# Patient Record
Sex: FEMALE | Race: WHITE | NOT HISPANIC OR LATINO
[De-identification: names, ages, dates, MRNs, and addresses within clinical notes are randomized per-mention and may not be internally consistent; named-entity substitution may affect disease eponyms.]

---

## 2017-01-20 ENCOUNTER — APPOINTMENT (OUTPATIENT)
Dept: DERMATOLOGY | Facility: CLINIC | Age: 57
End: 2017-01-20

## 2017-10-04 ENCOUNTER — FORM ENCOUNTER (OUTPATIENT)
Age: 57
End: 2017-10-04

## 2017-10-05 ENCOUNTER — APPOINTMENT (OUTPATIENT)
Dept: ORTHOPEDIC SURGERY | Facility: CLINIC | Age: 57
End: 2017-10-05
Payer: MEDICARE

## 2017-10-05 ENCOUNTER — OUTPATIENT (OUTPATIENT)
Dept: OUTPATIENT SERVICES | Facility: HOSPITAL | Age: 57
LOS: 1 days | End: 2017-10-05
Payer: MEDICARE

## 2017-10-05 ENCOUNTER — APPOINTMENT (OUTPATIENT)
Dept: ORTHOPEDIC SURGERY | Facility: CLINIC | Age: 57
End: 2017-10-05
Payer: OTHER MISCELLANEOUS

## 2017-10-05 VITALS
DIASTOLIC BLOOD PRESSURE: 80 MMHG | WEIGHT: 140 LBS | HEIGHT: 63 IN | SYSTOLIC BLOOD PRESSURE: 120 MMHG | BODY MASS INDEX: 24.8 KG/M2

## 2017-10-05 DIAGNOSIS — M76.01 GLUTEAL TENDINITIS, RIGHT HIP: ICD-10-CM

## 2017-10-05 DIAGNOSIS — M70.62 TROCHANTERIC BURSITIS, RIGHT HIP: ICD-10-CM

## 2017-10-05 DIAGNOSIS — Z98.89 OTHER SPECIFIED POSTPROCEDURAL STATES: Chronic | ICD-10-CM

## 2017-10-05 DIAGNOSIS — M76.02 GLUTEAL TENDINITIS, LEFT HIP: ICD-10-CM

## 2017-10-05 DIAGNOSIS — M70.61 TROCHANTERIC BURSITIS, RIGHT HIP: ICD-10-CM

## 2017-10-05 PROCEDURE — 72020 X-RAY EXAM OF SPINE 1 VIEW: CPT | Mod: 26

## 2017-10-05 PROCEDURE — 72020 X-RAY EXAM OF SPINE 1 VIEW: CPT

## 2017-10-05 PROCEDURE — 99203 OFFICE O/P NEW LOW 30 MIN: CPT

## 2017-10-05 PROCEDURE — 73521 X-RAY EXAM HIPS BI 2 VIEWS: CPT

## 2017-10-05 PROCEDURE — ZZZZZ: CPT

## 2017-10-05 PROCEDURE — 73521 X-RAY EXAM HIPS BI 2 VIEWS: CPT | Mod: 26

## 2017-10-20 PROBLEM — M76.02 TENDINOPATHY OF LEFT GLUTEAL REGION: Status: ACTIVE | Noted: 2017-10-20

## 2017-10-20 PROBLEM — M70.61 TROCHANTERIC BURSITIS OF BOTH HIPS: Status: ACTIVE | Noted: 2017-10-05

## 2017-10-20 PROBLEM — M76.01 TENDINOPATHY OF RIGHT GLUTEAL REGION: Status: ACTIVE | Noted: 2017-10-20

## 2017-10-20 RX ORDER — BUPROPION HCL 200 MG
TABLET,SUSTAINED-RELEASE 12 HR ORAL
Refills: 0 | Status: ACTIVE | COMMUNITY

## 2017-10-20 RX ORDER — CLONAZEPAM 2 MG/1
TABLET ORAL
Refills: 0 | Status: ACTIVE | COMMUNITY

## 2017-10-20 RX ORDER — CETIRIZINE HCL 10 MG
TABLET ORAL
Refills: 0 | Status: ACTIVE | COMMUNITY

## 2017-10-20 RX ORDER — LAMOTRIGINE 25 MG/1
TABLET ORAL
Refills: 0 | Status: ACTIVE | COMMUNITY

## 2018-07-14 ENCOUNTER — EMERGENCY (EMERGENCY)
Facility: HOSPITAL | Age: 58
LOS: 1 days | Discharge: HOME | End: 2018-07-14
Attending: EMERGENCY MEDICINE
Payer: MEDICARE

## 2018-07-14 VITALS
DIASTOLIC BLOOD PRESSURE: 69 MMHG | HEIGHT: 63 IN | TEMPERATURE: 97 F | RESPIRATION RATE: 17 BRPM | WEIGHT: 143.08 LBS | HEART RATE: 65 BPM | OXYGEN SATURATION: 97 % | SYSTOLIC BLOOD PRESSURE: 101 MMHG

## 2018-07-14 DIAGNOSIS — Z98.89 OTHER SPECIFIED POSTPROCEDURAL STATES: Chronic | ICD-10-CM

## 2018-07-14 PROCEDURE — 99283 EMERGENCY DEPT VISIT LOW MDM: CPT | Mod: 25

## 2018-07-14 PROCEDURE — 73630 X-RAY EXAM OF FOOT: CPT

## 2018-07-14 PROCEDURE — 99284 EMERGENCY DEPT VISIT MOD MDM: CPT

## 2018-07-14 PROCEDURE — 73630 X-RAY EXAM OF FOOT: CPT | Mod: 26,RT

## 2018-12-29 ENCOUNTER — EMERGENCY (EMERGENCY)
Facility: HOSPITAL | Age: 58
LOS: 1 days | Discharge: HOME | End: 2018-12-29
Attending: EMERGENCY MEDICINE
Payer: MEDICARE

## 2018-12-29 VITALS
SYSTOLIC BLOOD PRESSURE: 154 MMHG | DIASTOLIC BLOOD PRESSURE: 102 MMHG | WEIGHT: 136.03 LBS | HEART RATE: 96 BPM | TEMPERATURE: 97 F | HEIGHT: 63 IN | RESPIRATION RATE: 16 BRPM | OXYGEN SATURATION: 97 %

## 2018-12-29 DIAGNOSIS — Z98.89 OTHER SPECIFIED POSTPROCEDURAL STATES: Chronic | ICD-10-CM

## 2018-12-29 PROCEDURE — 99283 EMERGENCY DEPT VISIT LOW MDM: CPT

## 2018-12-29 NOTE — ED ADULT NURSE NOTE - OBJECTIVE STATEMENT
pt awake alert oriented x 4 not in dsitress,with compalint of feeling bloated,unable to urinate since 3 pm.

## 2018-12-30 VITALS
SYSTOLIC BLOOD PRESSURE: 143 MMHG | RESPIRATION RATE: 16 BRPM | DIASTOLIC BLOOD PRESSURE: 84 MMHG | HEART RATE: 95 BPM | OXYGEN SATURATION: 100 % | TEMPERATURE: 100 F

## 2018-12-30 LAB
APPEARANCE UR: CLEAR — SIGNIFICANT CHANGE UP
BILIRUB UR-MCNC: NEGATIVE — SIGNIFICANT CHANGE UP
COLOR SPEC: YELLOW — SIGNIFICANT CHANGE UP
DIFF PNL FLD: NEGATIVE — SIGNIFICANT CHANGE UP
GLUCOSE UR QL: NEGATIVE — SIGNIFICANT CHANGE UP
KETONES UR-MCNC: NEGATIVE — SIGNIFICANT CHANGE UP
LEUKOCYTE ESTERASE UR-ACNC: NEGATIVE — SIGNIFICANT CHANGE UP
NITRITE UR-MCNC: NEGATIVE — SIGNIFICANT CHANGE UP
PH UR: 6.5 — SIGNIFICANT CHANGE UP (ref 5–8)
PROT UR-MCNC: NEGATIVE — SIGNIFICANT CHANGE UP
SP GR SPEC: 1.01 — SIGNIFICANT CHANGE UP (ref 1.01–1.02)
UROBILINOGEN FLD QL: NEGATIVE — SIGNIFICANT CHANGE UP

## 2018-12-30 PROCEDURE — 99282 EMERGENCY DEPT VISIT SF MDM: CPT

## 2018-12-30 PROCEDURE — 81003 URINALYSIS AUTO W/O SCOPE: CPT

## 2018-12-30 NOTE — ED ADULT NURSE REASSESSMENT NOTE - NS ED NURSE REASSESS COMMENT FT1
pt awake aler toriented x 3not in dsitress,straight catheter done by nurse emmanuel obtained a yellowish output 450ml amount.

## 2018-12-30 NOTE — ED PROVIDER NOTE - OBJECTIVE STATEMENT
patient with HLD here with concern of being unable to void since this afternoon. She has had a URI a with cough and went to urgent care and received bromophed for cough which she has been taking. She took a laxative because she felt constipated and also unable to urinate. while in waiting room in Emergency Department, had bowel movement and abdominal discomfort resolved, but still unable to urinate.

## 2018-12-30 NOTE — ED PROVIDER NOTE - MEDICAL DECISION MAKING DETAILS
patient in urinary retention. patient denies taking any medication daily at home except the cough syrup (although chart indicates bipolar disorder so likely on mood stabilizers etc) I offeref straight cath vs nava, patient opted for straight cath-500cc urine drained. Patient instructed to return to Emergency Department if still unable to void by tomorrow morning. instructed to discontinue cough medication

## 2020-05-07 ENCOUNTER — EMERGENCY (EMERGENCY)
Facility: HOSPITAL | Age: 60
LOS: 1 days | Discharge: HOME | End: 2020-05-07
Attending: EMERGENCY MEDICINE
Payer: COMMERCIAL

## 2020-05-07 VITALS
OXYGEN SATURATION: 99 % | HEART RATE: 99 BPM | DIASTOLIC BLOOD PRESSURE: 64 MMHG | RESPIRATION RATE: 16 BRPM | WEIGHT: 141.98 LBS | SYSTOLIC BLOOD PRESSURE: 93 MMHG | TEMPERATURE: 98 F | HEIGHT: 63 IN

## 2020-05-07 DIAGNOSIS — Z98.89 OTHER SPECIFIED POSTPROCEDURAL STATES: Chronic | ICD-10-CM

## 2020-05-07 PROCEDURE — 99282 EMERGENCY DEPT VISIT SF MDM: CPT

## 2020-05-07 NOTE — ED ADULT NURSE NOTE - NSIMPLEMENTINTERV_GEN_ALL_ED
Implemented All Universal Safety Interventions:  [place] to call system. Call bell, personal items and telephone within reach. Instruct patient to call for assistance. Room bathroom lighting operational. Non-slip footwear when patient is off stretcher. Physically safe environment: no spills, clutter or unnecessary equipment. Stretcher in lowest position, wheels locked, appropriate side rails in place.

## 2020-05-07 NOTE — ED ADULT NURSE NOTE - PAIN RATING/NUMBER SCALE (0-10): ACTIVITY
Patient called me back and had many questions about the difference between a lumpectomy and mastectomy and what reconstruction would look like.    Patient requested to have a plastic surgery consult within the next week so she can discuss the options.    Patient is now scheduled to see Dr. Rayo at [facility] in [place] on 2/27/2019 @ 0930, with a check in time of 0915.      I will notify Dr. Montaño.    I also called patient back and left her a message to call me so I can inform of plastics consult.  Awaiting a return call.  Tatyana Pimentel, RN, BSN     5

## 2020-05-07 NOTE — ED PROVIDER NOTE - CLINICAL SUMMARY MEDICAL DECISION MAKING FREE TEXT BOX
cervical stenosis and bilateral carpal tunnel syndrome under care of neurology/pain management,need wrist braist

## 2020-05-07 NOTE — ED PROVIDER NOTE - CHPI ED SYMPTOMS NEG
no blurred vision/no confusion/no dizziness/no fever/no change in level of consciousness/no loss of consciousness/no nausea/no weakness

## 2020-05-07 NOTE — ED PROVIDER NOTE - OBJECTIVE STATEMENT
pt under going stubbs with neurology/orthopedic for cervical spine stenosis/bilateral carpal tunnel syndrome,co of pain in both hands.pt given acortisone injection yesterday to right thumb by pain management withouot relief.wants bilateral braces

## 2020-05-07 NOTE — ED PROVIDER NOTE - CARE PLAN
Principal Discharge DX:	Cervical stenosis of spinal canal  Secondary Diagnosis:	Carpal tunnel syndrome on both sides

## 2020-05-07 NOTE — ED PROVIDER NOTE - PATIENT PORTAL LINK FT
You can access the FollowMyHealth Patient Portal offered by  by registering at the following website: [email or web address]. By joining [employer]’s FollowMyHealth portal, you will also be able to view your health information using other applications (apps) compatible with our system.

## 2020-08-11 ENCOUNTER — EMERGENCY (EMERGENCY)
Facility: HOSPITAL | Age: 60
LOS: 1 days | Discharge: HOME | End: 2020-08-11
Attending: EMERGENCY MEDICINE
Payer: COMMERCIAL

## 2020-08-11 VITALS
RESPIRATION RATE: 16 BRPM | OXYGEN SATURATION: 99 % | SYSTOLIC BLOOD PRESSURE: 129 MMHG | DIASTOLIC BLOOD PRESSURE: 87 MMHG | TEMPERATURE: 98 F | WEIGHT: 154.98 LBS | HEIGHT: 63 IN | HEART RATE: 65 BPM

## 2020-08-11 DIAGNOSIS — Z98.89 OTHER SPECIFIED POSTPROCEDURAL STATES: Chronic | ICD-10-CM

## 2020-08-11 PROCEDURE — 99284 EMERGENCY DEPT VISIT MOD MDM: CPT | Mod: 25

## 2020-08-11 NOTE — ED ADULT TRIAGE NOTE - CHIEF COMPLAINT QUOTE
C/o " I fel like my tongue is swollen and I feel like I have sores on it since yesterday, both my jaws hurt and I am unable to open my mouth all the way.". Pt post Anterior cervical spinal fusion on 8/3/2020. Pt speaking in full sentences, no respiratory distress noted. C/o " I fel like my tongue is swollen and I feel like I have sores on it since yesterday, both my jaws hurt and I am unable to open my mouth all the way.i also have pain in my ears". Pt post Anterior cervical spinal fusion on 8/3/2020. Pt speaking in full sentences, no respiratory distress noted. C/o " I fel like my tongue is swollen and I feel like I have sores on it since yesterday, both my jaws hurt and I am unable to open my mouth all the way. I also have pain in my ears". Pt post Anterior cervical spinal fusion on 8/3/2020. Pt speaking in full sentences, no respiratory distress noted.

## 2020-08-12 VITALS
TEMPERATURE: 97 F | OXYGEN SATURATION: 99 % | SYSTOLIC BLOOD PRESSURE: 164 MMHG | RESPIRATION RATE: 17 BRPM | DIASTOLIC BLOOD PRESSURE: 92 MMHG | HEART RATE: 58 BPM

## 2020-08-12 PROCEDURE — 99284 EMERGENCY DEPT VISIT MOD MDM: CPT | Mod: 25

## 2020-08-12 PROCEDURE — 70486 CT MAXILLOFACIAL W/O DYE: CPT

## 2020-08-12 PROCEDURE — 70486 CT MAXILLOFACIAL W/O DYE: CPT | Mod: 26

## 2020-08-12 PROCEDURE — 96372 THER/PROPH/DIAG INJ SC/IM: CPT

## 2020-08-12 RX ORDER — METHOCARBAMOL 500 MG/1
2 TABLET, FILM COATED ORAL
Qty: 40 | Refills: 0
Start: 2020-08-12 | End: 2020-08-16

## 2020-08-12 RX ORDER — METHOCARBAMOL 500 MG/1
1500 TABLET, FILM COATED ORAL ONCE
Refills: 0 | Status: COMPLETED | OUTPATIENT
Start: 2020-08-12 | End: 2020-08-12

## 2020-08-12 RX ORDER — KETOROLAC TROMETHAMINE 30 MG/ML
30 SYRINGE (ML) INJECTION ONCE
Refills: 0 | Status: DISCONTINUED | OUTPATIENT
Start: 2020-08-12 | End: 2020-08-12

## 2020-08-12 RX ADMIN — METHOCARBAMOL 1500 MILLIGRAM(S): 500 TABLET, FILM COATED ORAL at 01:47

## 2020-08-12 RX ADMIN — Medication 30 MILLIGRAM(S): at 01:47

## 2020-08-12 NOTE — ED PROVIDER NOTE - NSFOLLOWUPCLINICS_GEN_ALL_ED_FT
[facility]  Internal Medicine  [address] [place] [address]  Phone: [phone]  Fax: [phone]  Follow Up Time:

## 2020-08-12 NOTE — ED PROVIDER NOTE - CLINICAL SUMMARY MEDICAL DECISION MAKING FREE TEXT BOX
No TMJ dislocation reported on CT. Pt is well appearing,  feels better, has no new complaints and able to walk with normal gait. Pt is stable for discharge and follow up with medical doctor. Pt educated on care and need for follow up. Discussed anticipatory guidance and return precautions. Questions answered. I had a detailed discussion with the patient and/or guardian regarding the historical points, exam findings, and any diagnostic results supporting the discharge diagnosis. Rx Robaxin. pt is on Percocet for recent Cervical fusion sx.

## 2020-08-12 NOTE — ED ADULT NURSE NOTE - CHIEF COMPLAINT QUOTE
C/o " I fel like my tongue is swollen and I feel like I have sores on it since yesterday, both my jaws hurt and I am unable to open my mouth all the way. I also have pain in my ears". Pt post Anterior cervical spinal fusion on 8/3/2020. Pt speaking in full sentences, no respiratory distress noted.

## 2020-08-12 NOTE — ED PROVIDER NOTE - PATIENT PORTAL LINK FT
You can access the FollowMyHealth Patient Portal offered by [facility] by registering at the following website: [email or web address]. By joining [employer]’s FollowMyHealth portal, you will also be able to view your health information using other applications (apps) compatible with our system.

## 2020-08-12 NOTE — ED PROVIDER NOTE - CHILD ABUSE FACILITY
Viridiana Mcmanus Patient Age: 72 year old  MESSAGE:   Pt is returning your call to r/s an appt with Cathy.     Next and Last Visit with Provider and Department  Last visit with this provider: 5/2/2019  Last visit with this department: 5/2/2019    Next visit with this provider: Visit date not found  Next visit with this department: Visit date not found    ALLERGIES:  Keflex  Current Outpatient Medications   Medication   • amLODIPine (NORVASC) 10 MG tablet   • aspirin 81 MG tablet   • CALTRATE 600 1500 (600 Ca) MG Tab   • clonazePAM (KLONOPIN) 0.5 MG tablet   • Omega-3 Fatty Acids (FISH OIL) 1200 MG capsule   • esomeprazole (NEXIUM) 40 MG capsule   • sertraline (ZOLOFT) 100 MG tablet   • simvastatin (ZOCOR) 20 MG tablet   • budesonide-formoterol (SYMBICORT) 160-4.5 MCG/ACT inhaler   • thiothixene (NAVANE) 1 MG capsule   • metoPROLOL succinate (TOPROL XL) 100 MG 24 hr tablet   • Cyanocobalamin 1000 MCG Tab CR   • Cholecalciferol (VITAMIN D3) 2000 units capsule     No current facility-administered medications for this visit.      PHARMACY to use:           Pharmacy preference(s) on file: No Pharmacies Listed    CALL BACK INFO: Ok to leave response (including medical information) with family member or on answering machine    PCP: Nicholas Sherwood DO         INS: Payor: MEDICARE / Plan: PARTA AND B / Product Type: MEDICARE     PATIENT ADDRESS:  [address] [place] [address] [place] [address]   H

## 2020-08-12 NOTE — ED PROVIDER NOTE - OBJECTIVE STATEMENT
Chief complaint of left TMJ area tenderness x 2 days. pt is s/p cervical fusion surgery at [facility] on 8/3/20 for cervical stenosis.

## 2020-08-12 NOTE — ED PROVIDER NOTE - NSFOLLOWUPINSTRUCTIONS_ED_ALL_ED_FT
Temporomandibular Joint Syndrome     Temporomandibular joint syndrome (TMJ syndrome) is a condition that causes pain in the temporomandibular joints. These joints are located near your ears and allow your jaw to open and close. For people with TMJ syndrome, chewing, biting, or other movements of the jaw can be difficult or painful.  TMJ syndrome is often mild and goes away within a few weeks. However, sometimes the condition becomes a long-term (chronic) problem.  What are the causes?  This condition may be caused by:  Grinding your teeth or clenching your jaw. Some people do this when they are under stress.Arthritis.Injury to the jaw.Head or neck injury.Teeth or dentures that are not aligned well.In some cases, the cause of TMJ syndrome may not be known.  What are the signs or symptoms?  The most common symptom of this condition is an aching pain on the side of the head in the area of the TMJ. Other symptoms may include:  Pain when moving your jaw, such as when chewing or biting.Being unable to open your jaw all the way.Making a clicking sound when you open your mouth.Headache.Earache.Neck or shoulder pain.How is this diagnosed?  This condition may be diagnosed based on:  Your symptoms and medical history.A physical exam. Your health care provider may check the range of motion of your jaw.Imaging tests, such as X-rays or an MRI.You may also need to see your dentist, who will determine if your teeth and jaw are lined up correctly.  How is this treated?  TMJ syndrome often goes away on its own. If treatment is needed, the options may include:  Eating soft foods and applying ice or heat.Medicines to relieve pain or inflammation.Medicines or massage to relax the muscles.A splint, bite plate, or mouthpiece to prevent teeth grinding or jaw clenching.Relaxation techniques or counseling to help reduce stress.A therapy for pain in which an electrical current is applied to the nerves through the skin (transcutaneous electrical nerve stimulation).Acupuncture. This is sometimes helpful to relieve pain.Jaw surgery. This is rarely needed.Follow these instructions at home:     Eating and drinking     Eat a soft diet if you are having trouble chewing.Avoid foods that require a lot of chewing. Do not chew gum.General instructions     Take over-the-counter and prescription medicines only as told by your health care provider.If directed, put ice on the painful area.  Put ice in a plastic bag.Place a towel between your skin and the bag.Leave the ice on for 20 minutes, 2–3 times a day.Apply a warm, wet cloth (warm compress) to the painful area as directed.Massage your jaw area and do any jaw stretching exercises as told by your health care provider.If you were given a splint, bite plate, or mouthpiece, wear it as told by your health care provider.Keep all follow-up visits as told by your health care provider. This is important.Contact a health care provider if:  You are having trouble eating.You have new or worsening symptoms.Get help right away if:  Your jaw locks open or closed.Summary  Temporomandibular joint syndrome (TMJ syndrome) is a condition that causes pain in the temporomandibular joints. These joints are located near your ears and allow your jaw to open and close.TMJ syndrome is often mild and goes away within a few weeks. However, sometimes the condition becomes a long-term (chronic) problem.Symptoms include an aching pain on the side of the head in the area of the TMJ, pain when chewing or biting, and being unable to open your jaw all the way. You may also make a clicking sound when you open your mouth.TMJ syndrome often goes away on its own. If treatment is needed, it may include medicines to relieve pain, reduce inflammation, or relax the muscles. A splint, bite plate, or mouthpiece may also be used to prevent teeth grinding or jaw clenching.This information is not intended to replace advice given to you by your health care provider. Make sure you discuss any questions you have with your health care provider.

## 2020-08-12 NOTE — ED ADULT NURSE NOTE - OBJECTIVE STATEMENT
came to Ed states with pain to left jaw goes to ear today with history Sx from cervical stenosis 5/3/2020. Pt denies numbness to extremities, or face, able to talk but states with pain when opening her mouth.

## 2020-11-11 ENCOUNTER — APPOINTMENT (OUTPATIENT)
Dept: UROGYNECOLOGY | Facility: CLINIC | Age: 60
End: 2020-11-11
Payer: MEDICAID

## 2020-11-11 VITALS
DIASTOLIC BLOOD PRESSURE: 80 MMHG | TEMPERATURE: 97.3 F | HEIGHT: 63 IN | WEIGHT: 135 LBS | BODY MASS INDEX: 23.92 KG/M2 | SYSTOLIC BLOOD PRESSURE: 130 MMHG

## 2020-11-11 DIAGNOSIS — A49.9 URINARY TRACT INFECTION, SITE NOT SPECIFIED: ICD-10-CM

## 2020-11-11 DIAGNOSIS — N39.0 URINARY TRACT INFECTION, SITE NOT SPECIFIED: ICD-10-CM

## 2020-11-11 PROCEDURE — 51798 US URINE CAPACITY MEASURE: CPT

## 2020-11-11 PROCEDURE — 99204 OFFICE O/P NEW MOD 45 MIN: CPT

## 2020-11-11 PROCEDURE — 99072 ADDL SUPL MATRL&STAF TM PHE: CPT

## 2020-11-11 NOTE — HISTORY OF PRESENT ILLNESS
[FreeTextEntry1] : The pt is a 59 y/o P0 with voiding dysfunction for 6 months.\par She valsalvas to void\par She denies UI.\par She feels complete emptying of her bladder.\par She voids hrly with a varied  volume and has nocturia 1-2 with a large volume\par Her liquid intake consists of 2 L water, 1 latte/day.\par She has a BM q day to Q 2-3 days .  She take Linzess. \par She denies gross hematuria, recurrent UTIs, hx of nephrolithiaisis or vaginal bulge.\par Her last PAP was 2019 , and she has a hx of HPV.  She had colposcopy, which was neg\par The last time she had intercourse was months ago, reports occasional dyspareunia . \par She denies having abdominal surgery.  She did have orthopedic surgery including hip/knee, cervical fusion surgery.\par Her medical prob consists of depression. bipolar disease, migrains...\par She smokes 1/2 PPD since she qas 13.\par She is on numerous meds for bipolar disease.\par

## 2020-11-11 NOTE — ASSESSMENT
[FreeTextEntry1] : It is likely that her voiding dysfunction is related to her psychiatric disorder and side effects of the  medications.\par Today's findings were discussed with the pt    She will be scheduled for urodynamics and office cystoscopy as she is a smoker.  Also, her urine was sent for culture today to rule out an infectious cause of her symptoms.\par \par \par \par

## 2020-11-11 NOTE — LETTER BODY
[Dear  ___] : Dear  [unfilled], [I had the pleasure of evaluating your patient, [unfilled]. Thank you for referring Ms. [unfilled] for consultation for ___] : I had the pleasure of evaluating your patient, [unfilled]. Thank you for referring Ms. [unfilled] for consultation for [unfilled]. [Attached please find my note.] : Attached please find my note. [Thank you very much for allowing me to participate in the care of this patient. If you have any questions, please do not hesitate to contact me] : Thank you very much for allowing me to participate in the care of this patient. If you have any questions, please do not hesitate to contact me. [DrKrista  ___] : Dr. ALMARAZ

## 2020-11-11 NOTE — PHYSICAL EXAM
[No Acute Distress] : in no acute distress [Well developed] : well developed [Well Nourished] : ~L well nourished [Good Hygeine] : demonstrates good hygeine [Oriented x3] : oriented to person, place, and time [Normal Memory] : ~T memory was ~L unimpaired [Normal Mood/Affect] : mood and affect are normal [Anxiety] : patient is anxious [Normal Lung Sounds] : the lungs were clear to auscultation [Respirations regular] : ~T respiratory rate was regular [Rate & Rhythm Regular] : ~T heart rate and rhythm were normal [No Edema] : ~T edema was not present [Supple] : ~T the neck demonstrated no ~M decrease in suppleness [Thyroid Normal] : the thyroid ~T showed no abnormalities [Symmetrical] : the neck was ~L symmetrical [Normal Gait] : gait was normal [Labia Majora] : were normal [Labia Minora] : were normal [Bartholin's Gland] : both Bartholin's glands were normal  [Normal Appearance] : general appearance was normal [No Bleeding] : there was no active vaginal bleeding [2] : 2 [Normal] : no abnormalities [Post Void Residual ____ml] : post void residual was [unfilled] ml [Exam Deferred] : was deferred [Cough] : no cough [Dyspnea] : no dyspnea [Murmurs] : no murmurs were heard [Varicose Veins] : no varicose veins observed [Tracheal Deviation] : no tracheal deviation observed [Mass] : no ~M [unfilled] neck mass was observed [Mass (___ Cm)] : no ~M [unfilled] abdominal mass was palpated [Tenderness] : ~T no ~M abdominal tenderness observed [Distended] : not distended [H/Smegaly] : no hepatosplenomegaly [Hernia] : no hernia observed [Scar] : no scars [de-identified] : good support

## 2020-11-14 LAB — BACTERIA UR CULT: NORMAL

## 2020-12-02 ENCOUNTER — APPOINTMENT (OUTPATIENT)
Dept: UROGYNECOLOGY | Facility: CLINIC | Age: 60
End: 2020-12-02
Payer: MEDICARE

## 2020-12-02 PROCEDURE — 99072 ADDL SUPL MATRL&STAF TM PHE: CPT

## 2020-12-02 PROCEDURE — 52000 CYSTOURETHROSCOPY: CPT

## 2020-12-05 LAB — URINE CYTOLOGY: NORMAL

## 2020-12-14 ENCOUNTER — APPOINTMENT (OUTPATIENT)
Dept: UROGYNECOLOGY | Facility: CLINIC | Age: 60
End: 2020-12-14
Payer: MEDICAID

## 2020-12-14 PROCEDURE — 99213 OFFICE O/P EST LOW 20 MIN: CPT

## 2020-12-14 PROCEDURE — 99072 ADDL SUPL MATRL&STAF TM PHE: CPT

## 2020-12-14 NOTE — ASSESSMENT
[FreeTextEntry1] : The pt is being evaluated by GI for constipation as well.\par She will complete UDS in the near future\par

## 2020-12-23 PROBLEM — N39.0 UTI (URINARY TRACT INFECTION), BACTERIAL: Status: RESOLVED | Noted: 2020-11-07 | Resolved: 2020-12-23

## 2021-01-12 PROCEDURE — 99285 EMERGENCY DEPT VISIT HI MDM: CPT

## 2021-01-13 ENCOUNTER — EMERGENCY (EMERGENCY)
Facility: HOSPITAL | Age: 61
LOS: 1 days | Discharge: HOME | End: 2021-01-13
Attending: EMERGENCY MEDICINE
Payer: COMMERCIAL

## 2021-01-13 VITALS
HEART RATE: 80 BPM | OXYGEN SATURATION: 96 % | TEMPERATURE: 98 F | DIASTOLIC BLOOD PRESSURE: 61 MMHG | SYSTOLIC BLOOD PRESSURE: 114 MMHG | RESPIRATION RATE: 20 BRPM

## 2021-01-13 VITALS
OXYGEN SATURATION: 98 % | HEIGHT: 63 IN | RESPIRATION RATE: 24 BRPM | HEART RATE: 94 BPM | WEIGHT: 134.48 LBS | TEMPERATURE: 98 F | SYSTOLIC BLOOD PRESSURE: 146 MMHG | DIASTOLIC BLOOD PRESSURE: 89 MMHG

## 2021-01-13 DIAGNOSIS — Z98.89 OTHER SPECIFIED POSTPROCEDURAL STATES: Chronic | ICD-10-CM

## 2021-01-13 LAB
ALBUMIN SERPL ELPH-MCNC: 3.6 G/DL — SIGNIFICANT CHANGE UP (ref 3.5–5)
ALP SERPL-CCNC: 96 U/L — SIGNIFICANT CHANGE UP (ref 40–120)
ALT FLD-CCNC: 23 U/L DA — SIGNIFICANT CHANGE UP (ref 10–60)
ANION GAP SERPL CALC-SCNC: 5 MMOL/L — SIGNIFICANT CHANGE UP (ref 5–17)
APPEARANCE UR: CLEAR — SIGNIFICANT CHANGE UP
AST SERPL-CCNC: 9 U/L — LOW (ref 10–40)
BASOPHILS # BLD AUTO: 0.01 K/UL — SIGNIFICANT CHANGE UP (ref 0–0.2)
BASOPHILS NFR BLD AUTO: 0.1 % — SIGNIFICANT CHANGE UP (ref 0–2)
BILIRUB SERPL-MCNC: 0.3 MG/DL — SIGNIFICANT CHANGE UP (ref 0.2–1.2)
BILIRUB UR-MCNC: NEGATIVE — SIGNIFICANT CHANGE UP
BUN SERPL-MCNC: 8 MG/DL — SIGNIFICANT CHANGE UP (ref 7–18)
CALCIUM SERPL-MCNC: 9.4 MG/DL — SIGNIFICANT CHANGE UP (ref 8.4–10.5)
CHLORIDE SERPL-SCNC: 108 MMOL/L — SIGNIFICANT CHANGE UP (ref 96–108)
CO2 SERPL-SCNC: 27 MMOL/L — SIGNIFICANT CHANGE UP (ref 22–31)
COLOR SPEC: YELLOW — SIGNIFICANT CHANGE UP
CREAT SERPL-MCNC: 0.79 MG/DL — SIGNIFICANT CHANGE UP (ref 0.5–1.3)
DIFF PNL FLD: NEGATIVE — SIGNIFICANT CHANGE UP
EOSINOPHIL # BLD AUTO: 0.01 K/UL — SIGNIFICANT CHANGE UP (ref 0–0.5)
EOSINOPHIL NFR BLD AUTO: 0.1 % — SIGNIFICANT CHANGE UP (ref 0–6)
GLUCOSE SERPL-MCNC: 127 MG/DL — HIGH (ref 70–99)
GLUCOSE UR QL: NEGATIVE — SIGNIFICANT CHANGE UP
HCG SERPL-ACNC: 1 MIU/ML — SIGNIFICANT CHANGE UP
HCT VFR BLD CALC: 36.5 % — SIGNIFICANT CHANGE UP (ref 34.5–45)
HGB BLD-MCNC: 11.8 G/DL — SIGNIFICANT CHANGE UP (ref 11.5–15.5)
IMM GRANULOCYTES NFR BLD AUTO: 0.5 % — SIGNIFICANT CHANGE UP (ref 0–1.5)
KETONES UR-MCNC: NEGATIVE — SIGNIFICANT CHANGE UP
LEUKOCYTE ESTERASE UR-ACNC: ABNORMAL
LIDOCAIN IGE QN: 86 U/L — SIGNIFICANT CHANGE UP (ref 73–393)
LYMPHOCYTES # BLD AUTO: 1.66 K/UL — SIGNIFICANT CHANGE UP (ref 1–3.3)
LYMPHOCYTES # BLD AUTO: 16.8 % — SIGNIFICANT CHANGE UP (ref 13–44)
MCHC RBC-ENTMCNC: 28.9 PG — SIGNIFICANT CHANGE UP (ref 27–34)
MCHC RBC-ENTMCNC: 32.3 GM/DL — SIGNIFICANT CHANGE UP (ref 32–36)
MCV RBC AUTO: 89.2 FL — SIGNIFICANT CHANGE UP (ref 80–100)
MONOCYTES # BLD AUTO: 0.65 K/UL — SIGNIFICANT CHANGE UP (ref 0–0.9)
MONOCYTES NFR BLD AUTO: 6.6 % — SIGNIFICANT CHANGE UP (ref 2–14)
NEUTROPHILS # BLD AUTO: 7.49 K/UL — HIGH (ref 1.8–7.4)
NEUTROPHILS NFR BLD AUTO: 75.9 % — SIGNIFICANT CHANGE UP (ref 43–77)
NITRITE UR-MCNC: NEGATIVE — SIGNIFICANT CHANGE UP
NRBC # BLD: 0 /100 WBCS — SIGNIFICANT CHANGE UP (ref 0–0)
PH UR: 7 — SIGNIFICANT CHANGE UP (ref 5–8)
PLATELET # BLD AUTO: 247 K/UL — SIGNIFICANT CHANGE UP (ref 150–400)
POTASSIUM SERPL-MCNC: 4 MMOL/L — SIGNIFICANT CHANGE UP (ref 3.5–5.3)
POTASSIUM SERPL-SCNC: 4 MMOL/L — SIGNIFICANT CHANGE UP (ref 3.5–5.3)
PROT SERPL-MCNC: 7.3 G/DL — SIGNIFICANT CHANGE UP (ref 6–8.3)
PROT UR-MCNC: NEGATIVE — SIGNIFICANT CHANGE UP
RBC # BLD: 4.09 M/UL — SIGNIFICANT CHANGE UP (ref 3.8–5.2)
RBC # FLD: 13.3 % — SIGNIFICANT CHANGE UP (ref 10.3–14.5)
SODIUM SERPL-SCNC: 140 MMOL/L — SIGNIFICANT CHANGE UP (ref 135–145)
SP GR SPEC: 1 — LOW (ref 1.01–1.02)
TROPONIN I SERPL-MCNC: <0.015 NG/ML — SIGNIFICANT CHANGE UP (ref 0–0.04)
UROBILINOGEN FLD QL: NEGATIVE — SIGNIFICANT CHANGE UP
WBC # BLD: 9.87 K/UL — SIGNIFICANT CHANGE UP (ref 3.8–10.5)
WBC # FLD AUTO: 9.87 K/UL — SIGNIFICANT CHANGE UP (ref 3.8–10.5)

## 2021-01-13 PROCEDURE — 74177 CT ABD & PELVIS W/CONTRAST: CPT

## 2021-01-13 PROCEDURE — 96374 THER/PROPH/DIAG INJ IV PUSH: CPT | Mod: XU

## 2021-01-13 PROCEDURE — 84484 ASSAY OF TROPONIN QUANT: CPT

## 2021-01-13 PROCEDURE — 81001 URINALYSIS AUTO W/SCOPE: CPT

## 2021-01-13 PROCEDURE — 74177 CT ABD & PELVIS W/CONTRAST: CPT | Mod: 26

## 2021-01-13 PROCEDURE — 87086 URINE CULTURE/COLONY COUNT: CPT

## 2021-01-13 PROCEDURE — 80053 COMPREHEN METABOLIC PANEL: CPT

## 2021-01-13 PROCEDURE — 96375 TX/PRO/DX INJ NEW DRUG ADDON: CPT

## 2021-01-13 PROCEDURE — 84702 CHORIONIC GONADOTROPIN TEST: CPT

## 2021-01-13 PROCEDURE — 85025 COMPLETE CBC W/AUTO DIFF WBC: CPT

## 2021-01-13 PROCEDURE — 83690 ASSAY OF LIPASE: CPT

## 2021-01-13 PROCEDURE — 99284 EMERGENCY DEPT VISIT MOD MDM: CPT | Mod: 25

## 2021-01-13 PROCEDURE — 36415 COLL VENOUS BLD VENIPUNCTURE: CPT

## 2021-01-13 RX ORDER — POLYETHYLENE GLYCOL 3350 17 G/17G
17 POWDER, FOR SOLUTION ORAL
Qty: 100 | Refills: 0
Start: 2021-01-13 | End: 2021-01-17

## 2021-01-13 RX ORDER — MORPHINE SULFATE 50 MG/1
4 CAPSULE, EXTENDED RELEASE ORAL ONCE
Refills: 0 | Status: DISCONTINUED | OUTPATIENT
Start: 2021-01-13 | End: 2021-01-13

## 2021-01-13 RX ORDER — HYDROMORPHONE HYDROCHLORIDE 2 MG/ML
0.5 INJECTION INTRAMUSCULAR; INTRAVENOUS; SUBCUTANEOUS ONCE
Refills: 0 | Status: DISCONTINUED | OUTPATIENT
Start: 2021-01-13 | End: 2021-01-13

## 2021-01-13 RX ORDER — KETOROLAC TROMETHAMINE 30 MG/ML
30 SYRINGE (ML) INJECTION ONCE
Refills: 0 | Status: DISCONTINUED | OUTPATIENT
Start: 2021-01-13 | End: 2021-01-13

## 2021-01-13 RX ORDER — SODIUM CHLORIDE 9 MG/ML
1000 INJECTION INTRAMUSCULAR; INTRAVENOUS; SUBCUTANEOUS ONCE
Refills: 0 | Status: COMPLETED | OUTPATIENT
Start: 2021-01-13 | End: 2021-01-13

## 2021-01-13 RX ADMIN — MORPHINE SULFATE 4 MILLIGRAM(S): 50 CAPSULE, EXTENDED RELEASE ORAL at 02:24

## 2021-01-13 RX ADMIN — HYDROMORPHONE HYDROCHLORIDE 0.5 MILLIGRAM(S): 2 INJECTION INTRAMUSCULAR; INTRAVENOUS; SUBCUTANEOUS at 04:30

## 2021-01-13 RX ADMIN — SODIUM CHLORIDE 1000 MILLILITER(S): 9 INJECTION INTRAMUSCULAR; INTRAVENOUS; SUBCUTANEOUS at 02:24

## 2021-01-13 RX ADMIN — Medication 30 MILLIGRAM(S): at 02:24

## 2021-01-13 NOTE — ED PROVIDER NOTE - NSFOLLOWUPINSTRUCTIONS_ED_ALL_ED_FT
Take miralax as prescribed for constipation.  Followup with GI specialist for reevaluation of chronic constipation.  Return to ED if you develop fever>100.8F, vomiting or worsening abdominal pain.      Constipation    WHAT YOU NEED TO KNOW:    Constipation is when you have hard, dry bowel movements, or you go longer than usual between bowel movements.     DISCHARGE INSTRUCTIONS:    Call your doctor if:   •You have blood in your bowel movements.      •You have a fever and abdominal pain with the constipation.      •Your constipation gets worse.       •You start to vomit.      •You have questions or concerns about your condition or care.      Medicines:   •Medicine such as a laxative may help relax and loosen your intestines to help you have a bowel movement. Your provider may recommend you only use laxatives for a short time. Long-term use may make your bowels dependent on the medicine.      •Take your medicine as directed. Contact your healthcare provider if you think your medicine is not helping or if you have side effects. Tell him of her if you are allergic to any medicine. Keep a list of the medicines, vitamins, and herbs you take. Include the amounts, and when and why you take them. Bring the list or the pill bottles to follow-up visits. Carry your medicine list with you in case of an emergency.      Relieve constipation:   •A suppository may be used to help soften your bowel movements. This may make them easier to pass. A suppository is guided into your rectum through your anus.  Suppository for Constipation           •An enema is liquid medicine used to clear bowel movement from your rectum. The medicine is put into your rectum through your anus.  Enemas           Prevent constipation:   •Drink liquids as directed. You may need to drink extra liquids to help soften and move your bowels. Ask how much liquid to drink each day and which liquids are best for you.       •Eat high-fiber foods. This may help decrease constipation by adding bulk to your bowel movements. High-fiber foods include fruit, vegetables, whole-grain breads and cereals, and beans. Your healthcare provider or dietitian can help you create a high-fiber meal plan. Your provider may also recommend a fiber supplement if you cannot get enough fiber from food.              •Exercise regularly. Regular physical activity can help stimulate your intestines. Walking is a good exercise to prevent or relieve constipation. Ask which exercises are best for you.  Walking for Exercise           •Schedule a time each day to have a bowel movement. This may help train your body to have regular bowel movements. Bend forward while you are on the toilet to help move the bowel movement out. Sit on the toilet for at least 10 minutes, even if you do not have a bowel movement.       •Talk to your healthcare provider about your medicines. Certain medicines, such as opioids, can cause constipation. Your provider may be able to make medicine changes. For example, he or she may change the kind of medicine, or change when you take it.      Follow up with your healthcare provider as directed: Write down your questions so you remember to ask them during your visits.

## 2021-01-13 NOTE — ED PROVIDER NOTE - CLINICAL SUMMARY MEDICAL DECISION MAKING FREE TEXT BOX
60 y.o female with RUQ pain after eating , will get labs , UA , CT to r/o cholecystitis . Will give IV fluids, Toradol, Morphine and reassess 60 y.o female with RUQ pain after eating , will get labs , UA , CT to r/o cholecystitis . Will give IV fluids, Toradol, Morphine and reassess    labs show wbc wnl, cmp/lipase/trop wnl, UA shows bacteria-pt denies dysuria-will defer abx  CT A/P shows No evidence of acute intra-abdominal pathology. Moderate stool burden. There is fecalization of the small bowel which can be seen with slow motility. Note that CT cannot rule out acute cholecystitis.  Discussed above with patient. Advised patient to make sure she continues stool softeners while taking oxycodone and followup with GI. Patient reports she has daily BMs but she has had past issues with IBS/constipation of pelvic floor weakness, she agrees to followup with GI. patient stable for discharge with careful return to ED precautions.

## 2021-01-13 NOTE — ED PROVIDER NOTE - PATIENT PORTAL LINK FT
You can access the FollowMyHealth Patient Portal offered by [facility] by registering at the following website: [email or web address]. By joining [employer]’s FollowMyHealth portal, you will also be able to view your health information using other applications (apps) compatible with our system.

## 2021-01-13 NOTE — ED ADULT NURSE NOTE - OBJECTIVE STATEMENT
Pt presents to ED with c/o right flank pain started 20 mins prior to ED arrival. Pt denies hx of kidney stone.

## 2021-01-13 NOTE — ED PROVIDER NOTE - NSFOLLOWUPCLINICS_GEN_ALL_ED_FT
[facility]  Gastroenterology  [address] [place] [address]  Phone: [phone]  Fax: [phone]  Follow Up Time:

## 2021-01-13 NOTE — ED PROVIDER NOTE - OBJECTIVE STATEMENT
60 y.o female with a PMHx of anxiety, depression, and bipolar disorder , cervical fusion and a PSHx of orthopedic surgeries reports to the ED c/o sudden onset RUQ pain x30 prior to arrival after  eating asparagus and potatoes for dinner. Patient endorses the pain radiates to her back and R shoulder, but states she has chronic shoulder pain related to the cervical fusion. Patient states she took a dose of oxycodone prior to arrival but states they did not help with the pain. Patient denies any fever, vomiting, diarrhea, urinary symptoms, cough or any other acute complaints. NKDA

## 2021-01-14 LAB
CULTURE RESULTS: SIGNIFICANT CHANGE UP
SPECIMEN SOURCE: SIGNIFICANT CHANGE UP

## 2021-02-08 ENCOUNTER — APPOINTMENT (OUTPATIENT)
Dept: UROGYNECOLOGY | Facility: CLINIC | Age: 61
End: 2021-02-08
Payer: MEDICARE

## 2021-02-22 ENCOUNTER — APPOINTMENT (OUTPATIENT)
Dept: UROGYNECOLOGY | Facility: CLINIC | Age: 61
End: 2021-02-22
Payer: MEDICARE

## 2021-02-22 PROCEDURE — 51797 INTRAABDOMINAL PRESSURE TEST: CPT

## 2021-02-22 PROCEDURE — 51729 CYSTOMETROGRAM W/VP&UP: CPT

## 2021-02-22 PROCEDURE — 51798 US URINE CAPACITY MEASURE: CPT

## 2021-02-22 PROCEDURE — 51784 ANAL/URINARY MUSCLE STUDY: CPT

## 2021-02-22 PROCEDURE — 99072 ADDL SUPL MATRL&STAF TM PHE: CPT

## 2021-02-22 PROCEDURE — 51741 ELECTRO-UROFLOWMETRY FIRST: CPT

## 2021-03-01 ENCOUNTER — APPOINTMENT (OUTPATIENT)
Dept: UROGYNECOLOGY | Facility: CLINIC | Age: 61
End: 2021-03-01
Payer: MEDICAID

## 2021-03-01 PROCEDURE — 99072 ADDL SUPL MATRL&STAF TM PHE: CPT

## 2021-03-01 PROCEDURE — 99213 OFFICE O/P EST LOW 20 MIN: CPT

## 2021-03-01 NOTE — HISTORY OF PRESENT ILLNESS
[FreeTextEntry1] : The pt is here for a f/u visit for feeling of voiding dysfunction\par \par UDS:\par -LILIYA, -DO, +complete emptying with detrussor ctx\par Nl pelvic floor muscles.\par \par She reports that her voiding has improved since

## 2021-03-01 NOTE — ASSESSMENT
[FreeTextEntry1] : She has nl UDS and cystoscopy.\par It likely that she has voiding difficulty from constipation and bloating.\par She was advised to f/u with her GI.

## 2021-09-30 ENCOUNTER — TRANSCRIPTION ENCOUNTER (OUTPATIENT)
Age: 61
End: 2021-09-30

## 2021-10-04 ENCOUNTER — TRANSCRIPTION ENCOUNTER (OUTPATIENT)
Age: 61
End: 2021-10-04

## 2021-10-05 ENCOUNTER — APPOINTMENT (OUTPATIENT)
Dept: ORTHOPEDIC SURGERY | Facility: CLINIC | Age: 61
End: 2021-10-05

## 2021-10-07 ENCOUNTER — APPOINTMENT (OUTPATIENT)
Dept: ORTHOPEDIC SURGERY | Facility: CLINIC | Age: 61
End: 2021-10-07
Payer: MEDICARE

## 2021-10-07 VITALS — BODY MASS INDEX: 23.92 KG/M2 | WEIGHT: 135 LBS | HEIGHT: 63 IN

## 2021-10-07 PROCEDURE — 99202 OFFICE O/P NEW SF 15 MIN: CPT

## 2021-10-07 NOTE — HISTORY OF PRESENT ILLNESS
[de-identified] : 61F who presents for work clearance note after suffering a minor R knee injury several days ago. Patient states she was at a friend's house and tripped, falling onto her knees. She had mild-moderate pain following the fall and went to an urgent care clinic where R knee XRs were negative for acute fracture. She states that her boss then requested that she receive an evaluation by a doctor and a formal clearance note in order to be able to return to work. She states that her symptoms have completely resolved at this point and she has no pain.

## 2021-10-07 NOTE — ASSESSMENT
[FreeTextEntry1] : 61F who presents several days after minor trauma to R knee requesting a return to work note\par - Patient without evidence of fracture or significant knee injury on imaging or exam\par - Patient may return to work without limitations\par - Note provided to patient\par - No need to return to clinic unless new orthopedic issue arises

## 2021-10-07 NOTE — PHYSICAL EXAM
[de-identified] : R knee: Grossly normal in appearance, small contusion over the medial aspect of the knee with minimal associated TTP\par Knee ROM is full and painless 0-125\par No instability to varus/valgus stress\par NVID [de-identified] : R knee XR shows mild medial compartment knee osteoarthritis and small suprapatellar effusion\par No evidence of acute fracture

## 2021-10-28 ENCOUNTER — TRANSCRIPTION ENCOUNTER (OUTPATIENT)
Age: 61
End: 2021-10-28

## 2021-11-23 ENCOUNTER — RESULT REVIEW (OUTPATIENT)
Age: 61
End: 2021-11-23

## 2021-11-23 ENCOUNTER — OUTPATIENT (OUTPATIENT)
Dept: OUTPATIENT SERVICES | Facility: HOSPITAL | Age: 61
LOS: 1 days | End: 2021-11-23
Payer: MEDICARE

## 2021-11-23 ENCOUNTER — APPOINTMENT (OUTPATIENT)
Dept: ORTHOPEDIC SURGERY | Facility: CLINIC | Age: 61
End: 2021-11-23
Payer: MEDICARE

## 2021-11-23 VITALS
HEIGHT: 63 IN | HEART RATE: 88 BPM | WEIGHT: 140 LBS | BODY MASS INDEX: 24.8 KG/M2 | DIASTOLIC BLOOD PRESSURE: 54 MMHG | OXYGEN SATURATION: 97 % | SYSTOLIC BLOOD PRESSURE: 112 MMHG

## 2021-11-23 DIAGNOSIS — M25.872 OTHER SPECIFIED JOINT DISORDERS, LEFT ANKLE AND FOOT: ICD-10-CM

## 2021-11-23 DIAGNOSIS — Z98.89 OTHER SPECIFIED POSTPROCEDURAL STATES: Chronic | ICD-10-CM

## 2021-11-23 DIAGNOSIS — M25.871 OTHER SPECIFIED JOINT DISORDERS, RIGHT ANKLE AND FOOT: ICD-10-CM

## 2021-11-23 PROCEDURE — 73564 X-RAY EXAM KNEE 4 OR MORE: CPT

## 2021-11-23 PROCEDURE — 73610 X-RAY EXAM OF ANKLE: CPT | Mod: 26,LT,RT

## 2021-11-23 PROCEDURE — 73564 X-RAY EXAM KNEE 4 OR MORE: CPT | Mod: 26,LT,RT

## 2021-11-23 PROCEDURE — 99214 OFFICE O/P EST MOD 30 MIN: CPT

## 2021-11-23 PROCEDURE — 73610 X-RAY EXAM OF ANKLE: CPT

## 2021-12-01 ENCOUNTER — TRANSCRIPTION ENCOUNTER (OUTPATIENT)
Age: 61
End: 2021-12-01

## 2021-12-14 ENCOUNTER — NON-APPOINTMENT (OUTPATIENT)
Age: 61
End: 2021-12-14

## 2021-12-15 ENCOUNTER — NON-APPOINTMENT (OUTPATIENT)
Age: 61
End: 2021-12-15

## 2021-12-20 ENCOUNTER — TRANSCRIPTION ENCOUNTER (OUTPATIENT)
Age: 61
End: 2021-12-20

## 2021-12-20 NOTE — HISTORY OF PRESENT ILLNESS
[de-identified] : The patient is a 61 year old woman presenting with right knee and ankle pain.\par \par The patient works as an .  She stands up to 7-8 hours per day at work.\par \par She recently saw Orthopedics about 6 weeks after ago minor mechanical fall at a friends house.  Knee exam was stable and was cleared to return to work. \par \par She presents with a several week history of chronic, atraumatic, bilateral anterior knee and bilateral anterolateral ankle pain.  No knee swelling.  Occasional dependent ankle swelling, improved with movement.  No recent ankle injury.  The patient denies mechanical symptoms including catching, locking, buckling.  She has tried NSAIDS without relief of symptoms.\par \par Pain is moderate in intensity, described as sharp, improved with rest, worse with stairs/prolonged standing.

## 2021-12-20 NOTE — PHYSICAL EXAM
[de-identified] : General: Well-nourished, well-developed, alert, and in no acute distress.\par Head: Normocephalic.\par Eyes: Pupils equal round reactive to light and accommodation, extraocular muscles intact, normal sclera.\par Nose: No nasal discharge.\par Cardiac: Regular rate. Extremities are warm and well perfused. Distal pulses are symmetric bilaterally.\par Respiratory: No labored breathing.\par Extremities: Sensation is intact distally bilaterally.  Distal pulses are symmetric bilaterally\par Lymphatic: No regional lymphadenopathy, no lymphedema\par Neurologic: No focal deficits\par Skin: Normal skin color, texture, and turgor\par Psychiatric: Normal affect\par MSK: as noted above/below\par \par \par \par RIGHT KNEE:\par \par Inspection: no bruising, swelling, erythema\par Joint Effusion:no \par ROM: Knee Flexion 120 , Knee Extension 0\par Palpation:MEDIAL JOINT LINE PAIN, PERIPATELLAR PAIN, No pain at patellar tendon, MFC/LFC, Medial/Lateral Tibial Plateau\par Leg Length Discrepancy:no\par Patella: no apprehension\par Distal Pulses: normal\par Lower Extremity Strength:normal, 5/5 \par Lower Extremity Reflexes:normal, 2+\par Lower Extremity Sensation: normal\par \par Special Tests:\par [place]:Negative \par Cynthia: Negative\par Anterior Drawer:Negative\par Posterior Drawer:Negative \par Varus/Valgus:Negative, no instability\par \par LEFT KNEE:\par \par Inspection: no bruising, swelling, erythema\par Joint Effusion:no \par ROM: Knee Flexion 120 , Knee Extension 0\par Palpation:MEDIAL JOINT LINE PAIN, PERIPATELLAR PAIN, No pain at patellar tendon, MFC/LFC, Medial/Lateral Tibial Plateau\par Leg Length Discrepancy:no\par Patella: no apprehension\par Distal Pulses: normal\par Lower Extremity Strength:normal, 5/5 \par Lower Extremity Reflexes:normal, 2+\par Lower Extremity Sensation: normal\par \par Special Tests:\par [facility]:Negative \par Cynthia: Negative\par Anterior Drawer:Negative\par Posterior Drawer:Negative \par Varus/Valgus:Negative, no instability\par \par \par RIGHT ANKLE:\par \par Inspection: no swelling, ecchymosis, gross deformity\par Palpation: NO pain at ATFL, CFL,deltoid ligament, joint line pain, lateral malleolus pain, medial malleolus pain, achilles pain,  fibular head pain, 5th metatarsal pain, tarsal or distal phalanx pain\par ROM: normal ankle dorsiflexion, ankle plantarflexion, eversion, inversion\par Strength: 5/5\par Neurovascular: 2+ pulses distally\par Sensation: normal\par \par Special Tests: \par Anterior Drawer: Negative\par Talar Tilt: Negative\par Lower Extremity Squeeze/Compression: Negative\par External Rotation at Tib-Fib Syndesmosis: Negative\par Mathur Test: Negative\par Gait: Normal Reciprocal Gait\par \par LEFT ANKLE:\par \par Inspection: no swelling, ecchymosis, gross deformity\par Palpation: NO pain at ATFL, CFL,deltoid ligament, joint line pain, lateral malleolus pain, medial malleolus pain, achilles pain,  fibular head pain, 5th metatarsal pain, tarsal or distal phalanx pain\par ROM: normal ankle dorsiflexion, ankle plantarflexion, eversion, inversion\par Strength: 5/5\par Neurovascular: 2+ pulses distally\par Sensation: normal\par \par Special Tests: \par Anterior Drawer: Negative\par Talar Tilt: Negative\par Lower Extremity Squeeze/Compression: Negative\par External Rotation at Tib-Fib Syndesmosis: Negative\par Mathur Test: Negative\par Gait: Normal Reciprocal Gait\par  [de-identified] : Xray Bilateral Knees - Multiple views were reviewed with the patient in detail.  There is no acute fracture or dislocation.  There is no joint effusion.  Bilateral medial compartment narrowing and patellofemoral osteophytosis.  We will await formal radiology reading.\par \par Xray BILATERAL Ankle - Multiple views were reviewed with the patient in detail.  There is no acute fracture or dislocation.  There is no joint effusion.  Joint spaces are preserved.  Heel spurs.  We will await formal radiology reading.\par \par \par

## 2021-12-20 NOTE — DISCUSSION/SUMMARY
[Medication Risks Reviewed] : Medication risks reviewed [de-identified] : The patient is a 61 year old woman presenting with chronic, atraumatic bilateral knee pain likely secondary to mild knee osteoarthritis.\par \par She also has mild bilateral anterolateral ankle pain likely secondary to ankle impingement.\par \par Imaging/Diagnostics/Medical Records were interpreted and/or reviewed and results were reviewed with the patient in detail.  All questions were answered appropriately.\par \par I discussed the treatment of degenerative arthritis with the patient at length today. I described the spectrum of treatment from nonoperative modalities to total joint arthroplasty. Noninvasive and nonoperative treatment modalities include weight reduction, activity modification with low impact exercise, PRN use of acetaminophen or anti-inflammatory medication if tolerated, natural supplements such as glucosamine/chondroitin, and physical therapy. Further treatments can include corticosteroid injection, hyaluronic acid injections, and orthobiologic such as PRP. Definitive treatment can certainly include total joint arthroplasty, but patient would require surgical consultation to discuss that option further. The risks and benefits of each treatment options was discussed and all questions were answered.\par \par \par The patient was also provided some general home exercises.  The patient was counseled on activity modification.\par \par Counseled on appropriate footwear.  May consider heel wedge orthotics.\par \par Patient has exhausted conservative treatment including relative rest, activity modification, at least 8 weeks of physician-directed home exercise program/physical therapy, pharmacologics INCLUDING NSAIDS, observation.  IT IS MEDICALLY NECESSARY TO APPROVE THE FOLLOWING - HA injections.\par \par \par Trial HA injections.\par \par Bilateral GEL-ONE injections ordered today.\par \par Follow-up for HA injections.\par \par ------------------------------------------------------------------------------------------------------------------\par Patient appreciates and agrees with current plan.\par \par The patient's diagnosis above was evaluated by me, personally.  Diagnostic Testing and treatment options were discussed with the patient in detail.  The risks/benefits/potential complications of diagnostic testing/treatments were described in detail.  \par \par This note was generated using a mixture of manual typing and dragon medical dictation software.  A reasonable effort has been made for proofreading its contents, but typos may still remain.  If there are any questions or points of clarification needed please notify my office.\par \par \par >45 minutes of time was spent on total encounter.  >50% of the visit was spent on face-to-face counseling/coordination of care and medical-decision making for this patient.\par

## 2021-12-21 ENCOUNTER — APPOINTMENT (OUTPATIENT)
Dept: COLORECTAL SURGERY | Facility: CLINIC | Age: 61
End: 2021-12-21
Payer: MEDICARE

## 2021-12-21 VITALS
HEIGHT: 63 IN | HEART RATE: 89 BPM | SYSTOLIC BLOOD PRESSURE: 139 MMHG | TEMPERATURE: 97.9 F | DIASTOLIC BLOOD PRESSURE: 79 MMHG | WEIGHT: 140 LBS | BODY MASS INDEX: 24.8 KG/M2

## 2021-12-21 DIAGNOSIS — Z80.0 FAMILY HISTORY OF MALIGNANT NEOPLASM OF DIGESTIVE ORGANS: ICD-10-CM

## 2021-12-21 PROCEDURE — 46600 DIAGNOSTIC ANOSCOPY SPX: CPT

## 2021-12-21 PROCEDURE — 99204 OFFICE O/P NEW MOD 45 MIN: CPT | Mod: 57

## 2021-12-21 NOTE — PHYSICAL EXAM
[FreeTextEntry1] : Medical assistant present for duration of physical examination\par \par General no acute distress, alert and oriented\par Psych calm, pleasant demeanor, responding appropriately to questions, tearful at times discussing her chronic medical conditions\par Nonlabored breathing\par Ambulating without assistance\par Skin normal color and pigment, no visible lesions or rashes\par \par Anorectal Exam:\par Inspection no erythema, induration or fluctuance, no skin excoriation, no fissure, soft external hemorrhoidal cushions engorge when asked to Valsalva, no full thickness rectal prolapse observed with Valsalva\par MAYKEL nontender, no masses palpated, no blood on gloved finger, good tone at rest, adequate squeeze\par \par Procedure: Anoscopy\par \par Pre procedure Diagnosis: pelvic floor dysfunction\par Post procedure Diagnosis: pelvic floor dysfunction\par Anesthesia: none\par Estimated blood loss: none\par Specimen: none\par Complications: none\par \par Consent obtained. Anoscopy was performed by passing a lighted anoscope with lubricant jelly into the anal canal and the entire anal mucosal surface was inspected. Findings included no fissure, mild internal hemorrhoids, no visible masses or lesions in anal canal, redundant mucosa noted with Valsalva without prolapse from anal canal.\par \par Patient tolerated examination and procedure well.\par \par \par

## 2021-12-21 NOTE — ASSESSMENT
[FreeTextEntry1] : Exam findings and diagnosis were discussed at length with patient.\par Prior history and records were reviewed and discussed with patient.\par Patient with extensive history of GI motility disorder. She has seen multiple GIs, alternative medicine providers, and urogyn in the past.\par She expresses interest in establishing care with a new GI at [facility] - contact information provided for Dr Titus, Director of neurogastroenterology and motility.\par \par Discussed with patient she is high risk for rectal prolapse formation. No full thickness rectal procidentia noted today on exam.\par Continue medical management of GI motility.\par Agree with pelvic floor physical therapy, patient plans to resume in new year.\par \par Continue supportive care.\par Signs/symptoms of prolapse discussed with patient. If occur, recommend f/u evaluation.\par All questions were answered, patient expressed understanding, and is agreeable to this plan.\par

## 2021-12-21 NOTE — HISTORY OF PRESENT ILLNESS
[FreeTextEntry1] : 60 y/o F presents for evaluation of possible rectal prolapse vs hemorrhoidal prolapse\par H/o IBS-C, bi-polar disorder, endometriosis, depression/anxiety \par PSH of laparoscopy for endometriosis (2000), multiple orthopedic surgeries \par \par H/o methane predominant SIBO. Previously treated with a course of Xifaxan and Neomycin. During course of treatment, experienced extreme constipation and needed to completed two colonics per GI notes. Was to try a round of Xifaxan and Flagyl, completed as prescribed \par Patient is concerned she has IBD \par \par Previously evaluated by Dr. Nadia Hayes, urogynecologist, for voiding dysfunction. Urodynamic testing and cystoscopy were normal \par \par Previously completed pelvic floor PT for pelvic floor dysfunction diagnosed on ARM. Completed several sessions, scheduled to resume in January \par \par CT A/P completed 1/13/21 at [facility] to evaluated for RUQ pain\par - No evidence of acute intra-abdominal pathology. \par - Moderate stool burden. There is fecalization of the small bowel which can be seen with slow motility. - Note that CT cannot rule out acute cholecystitis.\par \par Seeing an acupuncturist as well, started on herbal supplements for digestion. (cinnamon and peony)\par \par C/o "excessive skin" that protrudes with some but not all BM's. Does not need to manually reduce\par Denies pain, itching, bleeding \par BH:Daily to every couple of days \par H/o chronic constipation and straining\par Previously on Linzess, Amitiza and Motegrity. Per GI notes, could consider Zelnorm. Currently using 290 mcg 2 tabs daily other wise unable to evacuate her bowls. Concerned about long term use of medications and would like to discontinue and find new GI\par Currently following a began diet. Currently drinking 8 cups of water daily \par \par Last EGD/colonoscopy 12/16/2019 w/ Dr. Marisol Saab\par - sliding hiatal hernia\par - multiple linear erosions in the esophagus consistent with esophagitis\par - single 3 mm ulcer found in the antrum of the stomach\par - localized erythema compatible with gastritis\par - few non-bleeding diverticula in the sigmoid colon\par - single 3 mm non-bleeding polyps in the sigmoid colon, resected and retrieved\par - small internal hemorrhoids\par \par EGD repeated March 2021\par \par FMH of colon cancer in mother, diagnosed at age in her 60's with a recurrence and passed in her mid 70's\par No ASA/NSAIDs last 7 days

## 2022-01-06 ENCOUNTER — APPOINTMENT (OUTPATIENT)
Dept: OTOLARYNGOLOGY | Facility: CLINIC | Age: 62
End: 2022-01-06
Payer: MEDICARE

## 2022-01-06 VITALS — BODY MASS INDEX: 24.8 KG/M2 | HEIGHT: 63 IN | WEIGHT: 140 LBS | TEMPERATURE: 97.9 F

## 2022-01-06 DIAGNOSIS — H93.293 OTHER ABNORMAL AUDITORY PERCEPTIONS, BILATERAL: ICD-10-CM

## 2022-01-06 DIAGNOSIS — H93.299 OTHER ABNORMAL AUDITORY PERCEPTIONS, UNSPECIFIED EAR: ICD-10-CM

## 2022-01-06 PROCEDURE — 92557 COMPREHENSIVE HEARING TEST: CPT

## 2022-01-06 PROCEDURE — 92567 TYMPANOMETRY: CPT

## 2022-01-06 PROCEDURE — 99204 OFFICE O/P NEW MOD 45 MIN: CPT | Mod: 25

## 2022-01-06 PROCEDURE — 31231 NASAL ENDOSCOPY DX: CPT

## 2022-01-06 RX ORDER — IPRATROPIUM BROMIDE 21 UG/1
0.03 SPRAY NASAL TWICE DAILY
Qty: 1 | Refills: 2 | Status: ACTIVE | COMMUNITY
Start: 2022-01-06 | End: 1900-01-01

## 2022-01-06 NOTE — HISTORY OF PRESENT ILLNESS
[de-identified] : Initial visit. Her chief complaint is "left ear may be infected, hearing checked, sinuses".\par \par She gives a history by reporting to me that she feels her proceed hearing is not as good as it should be.\par She does not have a history of chronic ear problems.\par \par She transitions to reporting to me that she has daily sinus headaches. She has a chronic postnasal drip. She has tried Flonase in the past without benefit. She is scheduled for a sinus left in her right maxilla from her dentist after a tooth extracted.

## 2022-01-06 NOTE — PROCEDURE
[FreeTextEntry6] : PROCEDURE NOTES\par \par PROCEDURE: Nasal endoscopy \par SURGEON: Dr. Carreon\par INDICATIONS: Assess for chronic sinusitis. \par ANESTHESIA: The patient was placed in a sitting position.  Following application of the topical anesthetic and decongestant, exam was performed with a zero degree endoscope.  The scope was passed along the right nasal floor to the nasopharynx.  It was then passed into the region of the middle meatus, middle turbinate, and sphenoethmoid region.  An identical procedure was performed on the left side.  The following findings were noted:\par \par The nasal mucosa was healthy appearing and the septum was roughly midline. The middle meatus and sphenoethmoid recesses were clear bilaterally. The nasopharynx was normal. \par  \par Diffuse rhinitis. Bilateral inferior turbinate hypertrophy. Middle meatuses are patent.

## 2022-01-06 NOTE — ASSESSMENT
[FreeTextEntry1] : the audiogram was ordered by me and interpreted by me today and the results are as follows- Normal symmetric pure-tone hearing, speech discrimination scores and tympanograms. No further workup or treatment is required.\par \par As far as sinus headaches are concerned. After further discussion it is clear that she is migraine headaches. I explained to her that symptoms of postnasal drip are often associated with migraine headaches. However because of a long history and failure to respond Flonase will give her a trial of Atrovent.\par \par Furthermore I seen nothing on exam or history that would preclude her to be a candidate for a sinus lift.\par I did ask her to discuss with her dentist if he was going to do imaging of her maxillary sinuses.

## 2022-01-31 ENCOUNTER — APPOINTMENT (OUTPATIENT)
Dept: ORTHOPEDIC SURGERY | Facility: CLINIC | Age: 62
End: 2022-01-31
Payer: MEDICARE

## 2022-02-14 ENCOUNTER — APPOINTMENT (OUTPATIENT)
Dept: ORTHOPEDIC SURGERY | Facility: CLINIC | Age: 62
End: 2022-02-14
Payer: MEDICARE

## 2022-02-14 VITALS
WEIGHT: 140 LBS | BODY MASS INDEX: 24.8 KG/M2 | OXYGEN SATURATION: 98 % | HEIGHT: 63 IN | HEART RATE: 87 BPM | DIASTOLIC BLOOD PRESSURE: 70 MMHG | TEMPERATURE: 98.5 F | SYSTOLIC BLOOD PRESSURE: 150 MMHG

## 2022-02-14 PROCEDURE — 20611 DRAIN/INJ JOINT/BURSA W/US: CPT | Mod: RT

## 2022-02-14 PROCEDURE — 20611 DRAIN/INJ JOINT/BURSA W/US: CPT | Mod: LT

## 2022-02-14 RX ORDER — HYALURONATE SOD, CROSS-LINKED 30 MG/3 ML
30 SYRINGE (ML) INTRAARTICULAR
Qty: 2 | Refills: 0 | Status: COMPLETED | COMMUNITY
Start: 2021-11-23 | End: 2022-02-14

## 2022-02-14 RX ORDER — IBUPROFEN 800 MG/1
800 TABLET, FILM COATED ORAL TWICE DAILY
Qty: 60 | Refills: 2 | Status: ACTIVE | COMMUNITY
Start: 2022-02-14 | End: 1900-01-01

## 2022-02-14 NOTE — PROCEDURE
[de-identified] : Ultrasound-Guided RIGHT Knee Injection\par \par Indication for U/S Guidance: Ensure placement within the tibiofemoral joint for diagnostic purposes, while avoiding neurovascular structures\par \par Indication for Injection: KNEE OSTEOARTHRITIS\par \par A discussion was had with the patient regarding this procedure and all questions were answered. All risks, benefits and alternatives were discussed. These included but were not limited to bleeding, infection, injection site reaction/complication and allergic reaction. A timeout was done to ensure correct side and pt agreed to the procedure. Betadine was used to sterilize and prep the area, and alcohol was used to clean the skin in the anterior aspect of the knee joint. The suprapatellar space was visualized utilizing the Sonosite, linear transducer. The joint was visualized in the short axis and an in-plane approach was used for the injection. Ultrasound guidance was utilized to ensure accuracy of the intra-articular injection and avoid the neurovascular structures. A 22-gauge 1.5" needle was used to inject 2cc of 1% lidocaine without epi into the SubQ.  This was followed by injection with [].  A sterile bandage was then applied. The patient tolerated the procedure well and there were no complications.\par \par \par Ultrasound-Guided LEFT Knee Injection\par \par Indication for U/S Guidance: Ensure placement within the tibiofemoral joint for diagnostic purposes, while avoiding neurovascular structures\par \par Indication for Injection: KNEE OSTEOARTHRITIS\par \par A discussion was had with the patient regarding this procedure and all questions were answered. All risks, benefits and alternatives were discussed. These included but were not limited to bleeding, infection, injection site reaction/complication and allergic reaction. A timeout was done to ensure correct side and pt agreed to the procedure. Betadine was used to sterilize and prep the area, and alcohol was used to clean the skin in the anterior aspect of the knee joint. The suprapatellar space was visualized utilizing the Sonosite, linear transducer. The joint was visualized in the short axis and an in-plane approach was used for the injection. Ultrasound guidance was utilized to ensure accuracy of the intra-articular injection and avoid the neurovascular structures. A 22-gauge 1.5" needle was used to inject 2cc of 1% lidocaine without epi into the SubQ.  This was followed by injection with [].  A sterile bandage was then applied. The patient tolerated the procedure well and there were no complications.\par \par \par \par Gel-One injection - Bilateral knee joints\par Lot #: [id number]\par Exp: 09-\par Man: Joshua\par NDC: [id number]

## 2022-02-17 ENCOUNTER — LABORATORY RESULT (OUTPATIENT)
Age: 62
End: 2022-02-17

## 2022-02-17 ENCOUNTER — APPOINTMENT (OUTPATIENT)
Dept: GASTROENTEROLOGY | Facility: CLINIC | Age: 62
End: 2022-02-17
Payer: MEDICARE

## 2022-02-17 VITALS
TEMPERATURE: 97.2 F | BODY MASS INDEX: 25.34 KG/M2 | OXYGEN SATURATION: 98 % | SYSTOLIC BLOOD PRESSURE: 145 MMHG | WEIGHT: 143 LBS | RESPIRATION RATE: 15 BRPM | HEART RATE: 78 BPM | HEIGHT: 63 IN | DIASTOLIC BLOOD PRESSURE: 83 MMHG

## 2022-02-17 PROCEDURE — 99204 OFFICE O/P NEW MOD 45 MIN: CPT

## 2022-02-18 NOTE — ASSESSMENT
[FreeTextEntry1] : 60 yo F with hx of constipation (exacerbated by multiple surgeries with post op opioid use), dyssynergic defecation, IBS-C, methane positive SIBO s/p Xifaxan and Neomycin followed by Xifaxan + Flagyl, antral ulcer on EGD 2019 (repeat with resolution by Dr. Saab), bi-polar disorder, endometriosis, depression/anxiety, referred by Dr. Kunz to establish GI care.\par \par #Constipation\par #Dyssynergic defecation\par - Labs: CBC, CASIMIRO, CRP, TSH, food allergy\par - Renew Rx for Linzess and motegrity, miralax\par - Continue with pelvic floor PT, has completed 3 sessions and to resume next month\par - Encourage physical exercise\par - Avoid narcotics\par - Hydration\par \par #Gastric antral ulcer - RESOLVED\par - still on PPI daily\par - Avoid nsaids\par - discuss weaning as tolerated\par \par f/u 3mo

## 2022-02-18 NOTE — PHYSICAL EXAM
[General Appearance - Alert] : alert [General Appearance - In No Acute Distress] : in no acute distress [Sclera] : the sclera and conjunctiva were normal [Outer Ear] : the ears and nose were normal in appearance [Neck Appearance] : the appearance of the neck was normal [] : no respiratory distress [Heart Rate And Rhythm] : heart rate was normal and rhythm regular [Edema] : there was no peripheral edema [Abdomen Soft] : soft [Abdomen Tenderness] : non-tender [Abdomen Mass (___ Cm)] : no abdominal mass palpated [No CVA Tenderness] : no ~M costovertebral angle tenderness [Abnormal Walk] : normal gait [Skin Color & Pigmentation] : normal skin color and pigmentation [No Focal Deficits] : no focal deficits [Oriented To Time, Place, And Person] : oriented to person, place, and time

## 2022-02-18 NOTE — HISTORY OF PRESENT ILLNESS
[de-identified] : HPI- 60 yo F with hx of constipation (exacerbated by multiple surgeries with post op opioid use), dyssynergic defecation, IBS-C, methane positive SIBO s/p Xifaxan and Neomycin followed by Xifaxan + Flagyl, antral ulcer on EGD 2019 (repeat with resolution by Dr. Saab), bi-polar disorder, endometriosis, depression/anxiety, referred by Dr. Kunz to establish GI care.\par \par 1: Constipation/ Bloating\par - Currently on Linzess 290 tab daily- if takes 1 pill no affect, but finds 2 is too strong. Cant take while working because causes diarrhea\par - Motegrity- takes when cant take linzess, helps somewhat\par - ARM with dyssynergic defecation\par - Working with pelvic floor therapist, Luisa at [facility]\par - Has to push to urinate\par - Symptoms worsened in 2018 after L hip surgery and notes symptoms are exacerbated with each orthopedic surgery - was on opioids and inactive\par \par 2. Hx of gastric antral ulcer\par Currently on omeprazole, 40 mg daily on PPI x 3 years. \par Repeat EGD with resolution\par \par Referred by- Dr. Kunz\par \par 3. Hx of SIBO s/p 2 rounds of treatment\par \par PMHx- anxiety, hld\par PSHx- orthopedic - knee, hip, hand, spine\par Rx- amitriptyline, trazodone prn, xanax prn, omeprazole\par Supplements/herbs/OTC- renew probiotics and clean and simple digestive enzymes, vit b12 and v D.\par A/c or NSAIDs?  x 1 wk\par FHx-  mother with colon cancer,  age 75\par Allergies- no\par ETOH- rarely\par Smoking-  ppd\par Drugs- no\par Diet- vegan\par \par Labs/stool tests- 10/22/21- COMP WNL, \par Breath tests-\par Imaging- CT abd with constipation \par EGD- \par 1) 3/2021 w/ Dr. Marisol Saab, resolution of antral ulcer, no esophagitis, gastritis, or duodenitis\par 2) 2019 w/ Dr. Marisol Frado\par - sliding hiatal hernia\par - multiple linear erosions in the esophagus consistent with esophagitis\par - single 3 mm ulcer found in the antrum of the stomach\par - localized erythema compatible with gastritis\par Colonoscopy- \par - few non-bleeding diverticula in the sigmoid colon\par - single 3 mm non-bleeding polyps in the sigmoid colon, resected and retrieved\par - small internal hemorrhoids

## 2022-02-25 LAB
ANA PAT FLD IF-IMP: ABNORMAL
ANA SER IF-ACNC: ABNORMAL
BARLEY IGE QN: <0.1 KUA/L
BASOPHILS # BLD AUTO: 0.04 K/UL
BASOPHILS NFR BLD AUTO: 0.5 %
CHERRY IGE QN: <0.1 KUA/L
COW MILK IGE QN: <0.1 KUA/L
CRAB IGE QN: <0.1 KUA/L
CRP SERPL-MCNC: <3 MG/L
DEPRECATED BARLEY IGE RAST QL: 0
DEPRECATED CHERRY IGE RAST QL: 0
DEPRECATED COW MILK IGE RAST QL: 0
DEPRECATED CRAB IGE RAST QL: 0
DEPRECATED EGG WHITE IGE RAST QL: 0
DEPRECATED OAT IGE RAST QL: 0
DEPRECATED PEANUT IGE RAST QL: 0
DEPRECATED RYE IGE RAST QL: 0
DEPRECATED SOYBEAN IGE RAST QL: 0
DEPRECATED WHEAT IGE RAST QL: 0
EGG WHITE IGE QN: <0.1 KUA/L
EOSINOPHIL # BLD AUTO: 0.1 K/UL
EOSINOPHIL NFR BLD AUTO: 1.4 %
HCT VFR BLD CALC: 42.3 %
HGB BLD-MCNC: 12.9 G/DL
IMM GRANULOCYTES NFR BLD AUTO: 0.4 %
LYMPHOCYTES # BLD AUTO: 2.34 K/UL
LYMPHOCYTES NFR BLD AUTO: 31.8 %
MAN DIFF?: NORMAL
MCHC RBC-ENTMCNC: 28 PG
MCHC RBC-ENTMCNC: 30.5 GM/DL
MCV RBC AUTO: 91.8 FL
MONOCYTES # BLD AUTO: 0.5 K/UL
MONOCYTES NFR BLD AUTO: 6.8 %
NEUTROPHILS # BLD AUTO: 4.34 K/UL
NEUTROPHILS NFR BLD AUTO: 59.1 %
OAT IGE QN: <0.1 KUA/L
PEANUT IGE QN: <0.1 KUA/L
PLATELET # BLD AUTO: 258 K/UL
RBC # BLD: 4.61 M/UL
RBC # FLD: 13.8 %
RYE IGE QN: <0.1 KUA/L
SOYBEAN IGE QN: <0.1 KUA/L
TOTAL IGE SMQN RAST: 14 KU/L
TSH SERPL-ACNC: 2.02 UIU/ML
WBC # FLD AUTO: 7.35 K/UL
WHEAT IGE QN: <0.1 KUA/L

## 2022-02-28 ENCOUNTER — APPOINTMENT (OUTPATIENT)
Dept: ORTHOPEDIC SURGERY | Facility: CLINIC | Age: 62
End: 2022-02-28

## 2022-03-01 ENCOUNTER — APPOINTMENT (OUTPATIENT)
Dept: GASTROENTEROLOGY | Facility: CLINIC | Age: 62
End: 2022-03-01
Payer: MEDICARE

## 2022-03-01 PROCEDURE — 99442: CPT

## 2022-03-01 NOTE — REASON FOR VISIT
[Home] : at home, [unfilled] , at the time of the visit. [Medical Office: ([facility])___] : at the medical office located in  [Verbal consent obtained from patient] : the patient, [unfilled] [Follow-Up: _____] : a [unfilled] follow-up visit

## 2022-03-02 NOTE — HISTORY OF PRESENT ILLNESS
[de-identified] : Patient is a 61yF who presents for follow-up. Patient recently seen and discussed history of constipation/bloating which has been deemed due to pelvic floor dysfunction and dyssynergic defecation coupled with several recent operative interventions that have led to short courses of pain killers and immobility. Patient has been taking Motegrity 2mg in addition to LInzess 290mcg, which now gives her several hours of loose stool per day to the point where she cannot get on a train or see clients without having issues. Patient due to restart pelvic floor PT today. She also endorses several OTC remedies including digestive enzymes, Renew Probiotic for Women, peppermint oil, licorice. She had been following up with an alternative therapy provider for herbal therapy and acupuncture, but found the cost prohibitive. Denies vivek pain, rectal bleeding, nausea, vomiting.

## 2022-03-02 NOTE — ASSESSMENT
[FreeTextEntry1] : 61yF presenting for follow-up of chronic constipation and pelvic floor dysfunction/dyssynergic defecation\par \par Chronic constipation - Patient currently taking Motegrity 2mg and Linzess 290mcg, which gives her nearly uncontrolable diarrhea. Prior to Motegrity had occasionally been taking Linzess 290 sometimes twice daily for effect. This no longer seems to be an issue, but coupled with Motegrity the 290mcg dose appears too strong. Has tried to decrease dose to 145mcg, however, felt this did not achieve the desired effect.\par - linzess 145, motegrity 2, titrate miralax 1-2 caps/day,  magnesium ox 500, no dig enzymes, eat pre/probiotics, consider psyllium husk\par - Encouraged follow-up with pelvic floor PT as this may ultimately be the most beneficial intervention\par - Encouraged adequate hydration and physical activity\par \par Family history of CRC - Mother  at age 75 from CRC. Last colonoscopy  with single 3mm polyp, pathology revealing hyperplastic polyp.\par - Repeat colon due \par \par RTC via TTM in 6-8 weeks\par Patient seen and discussed with attending physician\par \par

## 2022-03-08 RX ORDER — LINACLOTIDE 290 UG/1
290 CAPSULE, GELATIN COATED ORAL
Qty: 90 | Refills: 3 | Status: COMPLETED | COMMUNITY
Start: 2022-02-17 | End: 2022-03-08

## 2022-03-17 ENCOUNTER — NON-APPOINTMENT (OUTPATIENT)
Age: 62
End: 2022-03-17

## 2022-03-29 ENCOUNTER — LABORATORY RESULT (OUTPATIENT)
Age: 62
End: 2022-03-29

## 2022-03-29 ENCOUNTER — APPOINTMENT (OUTPATIENT)
Dept: GASTROENTEROLOGY | Facility: CLINIC | Age: 62
End: 2022-03-29
Payer: MEDICARE

## 2022-03-29 VITALS
BODY MASS INDEX: 26.05 KG/M2 | SYSTOLIC BLOOD PRESSURE: 116 MMHG | HEART RATE: 89 BPM | OXYGEN SATURATION: 98 % | TEMPERATURE: 97.8 F | HEIGHT: 63 IN | WEIGHT: 147 LBS | DIASTOLIC BLOOD PRESSURE: 67 MMHG

## 2022-03-29 PROCEDURE — 99215 OFFICE O/P EST HI 40 MIN: CPT

## 2022-03-30 LAB
ALBUMIN SERPL ELPH-MCNC: 4.4 G/DL
ALP BLD-CCNC: 120 U/L
ALT SERPL-CCNC: 13 U/L
ANION GAP SERPL CALC-SCNC: 15 MMOL/L
AST SERPL-CCNC: 19 U/L
BILIRUB SERPL-MCNC: <0.2 MG/DL
BUN SERPL-MCNC: 12 MG/DL
CALCIUM SERPL-MCNC: 9.5 MG/DL
CHLORIDE SERPL-SCNC: 102 MMOL/L
CO2 SERPL-SCNC: 25 MMOL/L
CREAT SERPL-MCNC: 0.9 MG/DL
EGFR: 74 ML/MIN/1.73M2
GLUCOSE SERPL-MCNC: 94 MG/DL
POTASSIUM SERPL-SCNC: 4.3 MMOL/L
PROT SERPL-MCNC: 6.8 G/DL
SODIUM SERPL-SCNC: 142 MMOL/L

## 2022-03-30 NOTE — ASSESSMENT
[FreeTextEntry1] : 61yF presenting for follow-up of constipation and bloating\par \par Constipation and bloating - Patient taking excessive prokinetics with simultaneous Motegrity, Trulance, and Linzess. \par - STOP MOTEGRITY AS MAY BE CONTRIBUTING TO ANXIETY\par - linzess 290, miralax 34g, mg ox 500\par - Continue pelvic floor PT\par - Encourage adequate fiber intake and hydration\par - Nutrition referral\par - on TCA, will explore other options with neurologist\par \par Rectal prolapse - Reducible, exacerbated by constipation\par - Continue pelvic floor PT\par - Follow-up with colorectal surgery\par \par RTC in DURATION\par Patient seen and discussed with attending physician

## 2022-03-30 NOTE — HISTORY OF PRESENT ILLNESS
[de-identified] : Patient is a 61yF who presents for follow-up of constipation and bloating. Patient reports that since her last visit, she has had trouble maintaining normal BMs. She had been taking Linzess 290mcg BID and Motegtrity 2mg. She feels that if she doesn't have a BM her symptoms will recur so she continues aggressive prokinetics. She called the clinic complaining of constipation when out of medications and was prescribed Trulance, which she now takes at night, in addition to 2mg of Motegrity and one 290mcg Linzess. She also went to get a colonic which "relieved her constipation". She has reducible rectal prolapse, but worries constantly about this worsening or progressing and believes it also impacts her ability to urinate. Continues with pelvic floor PT, but is unsure if any benefit has been derived yet.

## 2022-03-30 NOTE — PHYSICAL EXAM
[General Appearance - Alert] : alert [General Appearance - In No Acute Distress] : in no acute distress [General Appearance - Well Nourished] : well nourished [General Appearance - Well Developed] : well developed [General Appearance - Well-Appearing] : healthy appearing [Sclera] : the sclera and conjunctiva were normal [PERRL With Normal Accommodation] : pupils were equal in size, round, and reactive to light [Hearing Threshold Finger Rub Not Phillips] : hearing was normal [Examination Of The Oral Cavity] : the lips and gums were normal [Neck Cervical Mass (___cm)] : no neck mass was observed [Respiration, Rhythm And Depth] : normal respiratory rhythm and effort [Exaggerated Use Of Accessory Muscles For Inspiration] : no accessory muscle use [Heart Rate And Rhythm] : heart rate was normal and rhythm regular [Edema] : there was no peripheral edema [Veins - Varicosity Changes] : there were no varicosital changes [Nail Clubbing] : no clubbing  or cyanosis of the fingernails [Involuntary Movements] : no involuntary movements were seen [] : no rash [Skin Lesions] : no skin lesions [Sensation] : the sensory exam was normal to light touch and pinprick [No Focal Deficits] : no focal deficits [Oriented To Time, Place, And Person] : oriented to person, place, and time [FreeTextEntry1] : Anxious appearing

## 2022-03-30 NOTE — END OF VISIT
[] : Fellow [FreeTextEntry3] : time billed for encounter excludes teaching time\par  [Time Spent: ___ minutes] : I have spent [unfilled] minutes of time on the encounter.

## 2022-03-31 ENCOUNTER — EMERGENCY (EMERGENCY)
Facility: HOSPITAL | Age: 62
LOS: 1 days | Discharge: HOME | End: 2022-03-31
Attending: STUDENT IN AN ORGANIZED HEALTH CARE EDUCATION/TRAINING PROGRAM
Payer: COMMERCIAL

## 2022-03-31 VITALS
SYSTOLIC BLOOD PRESSURE: 107 MMHG | DIASTOLIC BLOOD PRESSURE: 70 MMHG | WEIGHT: 148.15 LBS | RESPIRATION RATE: 16 BRPM | OXYGEN SATURATION: 97 % | TEMPERATURE: 98 F | HEART RATE: 81 BPM | HEIGHT: 63 IN

## 2022-03-31 DIAGNOSIS — Z98.89 OTHER SPECIFIED POSTPROCEDURAL STATES: Chronic | ICD-10-CM

## 2022-03-31 LAB
ALBUMIN SERPL ELPH-MCNC: 3.8 G/DL — SIGNIFICANT CHANGE UP (ref 3.5–5)
ALP SERPL-CCNC: 106 U/L — SIGNIFICANT CHANGE UP (ref 40–120)
ALT FLD-CCNC: 24 U/L DA — SIGNIFICANT CHANGE UP (ref 10–60)
ANION GAP SERPL CALC-SCNC: 4 MMOL/L — LOW (ref 5–17)
APPEARANCE UR: CLEAR — SIGNIFICANT CHANGE UP
APTT BLD: 40 SEC — HIGH (ref 27.5–35.5)
AST SERPL-CCNC: 17 U/L — SIGNIFICANT CHANGE UP (ref 10–40)
BASOPHILS # BLD AUTO: 0.04 K/UL — SIGNIFICANT CHANGE UP (ref 0–0.2)
BASOPHILS NFR BLD AUTO: 0.5 % — SIGNIFICANT CHANGE UP (ref 0–2)
BILIRUB SERPL-MCNC: 0.4 MG/DL — SIGNIFICANT CHANGE UP (ref 0.2–1.2)
BILIRUB UR-MCNC: NEGATIVE — SIGNIFICANT CHANGE UP
BUN SERPL-MCNC: 8 MG/DL — SIGNIFICANT CHANGE UP (ref 7–18)
CALCIUM SERPL-MCNC: 9.3 MG/DL — SIGNIFICANT CHANGE UP (ref 8.4–10.5)
CHLORIDE SERPL-SCNC: 107 MMOL/L — SIGNIFICANT CHANGE UP (ref 96–108)
CO2 SERPL-SCNC: 29 MMOL/L — SIGNIFICANT CHANGE UP (ref 22–31)
COLOR SPEC: YELLOW — SIGNIFICANT CHANGE UP
CREAT SERPL-MCNC: 0.86 MG/DL — SIGNIFICANT CHANGE UP (ref 0.5–1.3)
DIFF PNL FLD: NEGATIVE — SIGNIFICANT CHANGE UP
EGFR: 78 ML/MIN/1.73M2 — SIGNIFICANT CHANGE UP
EOSINOPHIL # BLD AUTO: 0.13 K/UL — SIGNIFICANT CHANGE UP (ref 0–0.5)
EOSINOPHIL NFR BLD AUTO: 1.6 % — SIGNIFICANT CHANGE UP (ref 0–6)
GLUCOSE SERPL-MCNC: 71 MG/DL — SIGNIFICANT CHANGE UP (ref 70–99)
GLUCOSE UR QL: NEGATIVE — SIGNIFICANT CHANGE UP
HCG UR QL: NEGATIVE — SIGNIFICANT CHANGE UP
HCT VFR BLD CALC: 38.3 % — SIGNIFICANT CHANGE UP (ref 34.5–45)
HGB BLD-MCNC: 12.4 G/DL — SIGNIFICANT CHANGE UP (ref 11.5–15.5)
IMM GRANULOCYTES NFR BLD AUTO: 0.5 % — SIGNIFICANT CHANGE UP (ref 0–1.5)
INR BLD: 0.94 RATIO — SIGNIFICANT CHANGE UP (ref 0.88–1.16)
KETONES UR-MCNC: NEGATIVE — SIGNIFICANT CHANGE UP
LEUKOCYTE ESTERASE UR-ACNC: NEGATIVE — SIGNIFICANT CHANGE UP
LIDOCAIN IGE QN: 75 U/L — SIGNIFICANT CHANGE UP (ref 73–393)
LYMPHOCYTES # BLD AUTO: 2.68 K/UL — SIGNIFICANT CHANGE UP (ref 1–3.3)
LYMPHOCYTES # BLD AUTO: 33.8 % — SIGNIFICANT CHANGE UP (ref 13–44)
MCHC RBC-ENTMCNC: 28.4 PG — SIGNIFICANT CHANGE UP (ref 27–34)
MCHC RBC-ENTMCNC: 32.4 GM/DL — SIGNIFICANT CHANGE UP (ref 32–36)
MCV RBC AUTO: 87.6 FL — SIGNIFICANT CHANGE UP (ref 80–100)
MONOCYTES # BLD AUTO: 0.68 K/UL — SIGNIFICANT CHANGE UP (ref 0–0.9)
MONOCYTES NFR BLD AUTO: 8.6 % — SIGNIFICANT CHANGE UP (ref 2–14)
NEUTROPHILS # BLD AUTO: 4.36 K/UL — SIGNIFICANT CHANGE UP (ref 1.8–7.4)
NEUTROPHILS NFR BLD AUTO: 55 % — SIGNIFICANT CHANGE UP (ref 43–77)
NITRITE UR-MCNC: NEGATIVE — SIGNIFICANT CHANGE UP
NRBC # BLD: 0 /100 WBCS — SIGNIFICANT CHANGE UP (ref 0–0)
PH UR: 6 — SIGNIFICANT CHANGE UP (ref 5–8)
PLATELET # BLD AUTO: 240 K/UL — SIGNIFICANT CHANGE UP (ref 150–400)
POTASSIUM SERPL-MCNC: 4.3 MMOL/L — SIGNIFICANT CHANGE UP (ref 3.5–5.3)
POTASSIUM SERPL-SCNC: 4.3 MMOL/L — SIGNIFICANT CHANGE UP (ref 3.5–5.3)
PROT SERPL-MCNC: 7.1 G/DL — SIGNIFICANT CHANGE UP (ref 6–8.3)
PROT UR-MCNC: NEGATIVE — SIGNIFICANT CHANGE UP
PROTHROM AB SERPL-ACNC: 11.2 SEC — SIGNIFICANT CHANGE UP (ref 10.5–13.4)
RBC # BLD: 4.37 M/UL — SIGNIFICANT CHANGE UP (ref 3.8–5.2)
RBC # FLD: 13.7 % — SIGNIFICANT CHANGE UP (ref 10.3–14.5)
SODIUM SERPL-SCNC: 140 MMOL/L — SIGNIFICANT CHANGE UP (ref 135–145)
SP GR SPEC: 1.01 — SIGNIFICANT CHANGE UP (ref 1.01–1.02)
UROBILINOGEN FLD QL: NEGATIVE — SIGNIFICANT CHANGE UP
WBC # BLD: 7.93 K/UL — SIGNIFICANT CHANGE UP (ref 3.8–10.5)
WBC # FLD AUTO: 7.93 K/UL — SIGNIFICANT CHANGE UP (ref 3.8–10.5)

## 2022-03-31 PROCEDURE — 74019 RADEX ABDOMEN 2 VIEWS: CPT | Mod: 26

## 2022-03-31 PROCEDURE — 81001 URINALYSIS AUTO W/SCOPE: CPT

## 2022-03-31 PROCEDURE — 85730 THROMBOPLASTIN TIME PARTIAL: CPT

## 2022-03-31 PROCEDURE — 36415 COLL VENOUS BLD VENIPUNCTURE: CPT

## 2022-03-31 PROCEDURE — 83690 ASSAY OF LIPASE: CPT

## 2022-03-31 PROCEDURE — 80053 COMPREHEN METABOLIC PANEL: CPT

## 2022-03-31 PROCEDURE — 74019 RADEX ABDOMEN 2 VIEWS: CPT

## 2022-03-31 PROCEDURE — 99284 EMERGENCY DEPT VISIT MOD MDM: CPT

## 2022-03-31 PROCEDURE — 85025 COMPLETE CBC W/AUTO DIFF WBC: CPT

## 2022-03-31 PROCEDURE — 99283 EMERGENCY DEPT VISIT LOW MDM: CPT | Mod: 25

## 2022-03-31 PROCEDURE — 81025 URINE PREGNANCY TEST: CPT

## 2022-03-31 PROCEDURE — 87086 URINE CULTURE/COLONY COUNT: CPT

## 2022-03-31 PROCEDURE — 85610 PROTHROMBIN TIME: CPT

## 2022-03-31 RX ORDER — DOCUSATE SODIUM 100 MG
1 CAPSULE ORAL
Qty: 28 | Refills: 0
Start: 2022-03-31 | End: 2022-04-13

## 2022-03-31 RX ORDER — MULTIVIT WITH MIN/MFOLATE/K2 340-15/3 G
300 POWDER (GRAM) ORAL
Qty: 300 | Refills: 0
Start: 2022-03-31

## 2022-03-31 RX ADMIN — Medication 0.5 MILLIGRAM(S): at 13:36

## 2022-03-31 NOTE — ED PROVIDER NOTE - PROGRESS NOTE DETAILS
patient lab wnl. xray appears to show constipation. no signs of obstructions. given meds, gi f.u and return precautions and instructed to f.u pmd

## 2022-03-31 NOTE — ED PROVIDER NOTE - OBJECTIVE STATEMENT
61 y.o presenting with constipation and difficulty urinating. endorses that it's a ongoing issues x several years. denies n, v, abd pain, fever, dysuria, hematuria.

## 2022-03-31 NOTE — ED PROVIDER NOTE - CLINICAL SUMMARY MEDICAL DECISION MAKING FREE TEXT BOX
Patient presenting with decrease bm, no peritoneal, lack of flatus or other signs of obstructions. appears chronic in nature. will obtain lab, xray. serial abd exam and reassess

## 2022-03-31 NOTE — ED ADULT TRIAGE NOTE - CHIEF COMPLAINT QUOTE
urinary retention and constipation worse last night, pt on drug regimen for constipation with no success

## 2022-03-31 NOTE — ED ADULT NURSE NOTE - OBJECTIVE STATEMENT
Patient aox3, mildly anxious, cooperative, reports constipation and difficulty urination. No dysuria, no abd pain, no chest pain no SOB

## 2022-03-31 NOTE — ED PROVIDER NOTE - PATIENT PORTAL LINK FT
You can access the FollowMyHealth Patient Portal offered by [facility] by registering at the following website: [email or web address]. By joining [facility]’s FollowMyHealth portal, you will also be able to view your health information using other applications (apps) compatible with our system.

## 2022-04-01 LAB
CULTURE RESULTS: SIGNIFICANT CHANGE UP
SPECIMEN SOURCE: SIGNIFICANT CHANGE UP

## 2022-04-05 ENCOUNTER — NON-APPOINTMENT (OUTPATIENT)
Age: 62
End: 2022-04-05

## 2022-04-06 RX ORDER — POLYETHYLENE GLYCOL 3350 17 G/17G
17 POWDER, FOR SOLUTION ORAL
Qty: 1 | Refills: 0 | Status: ACTIVE | COMMUNITY
Start: 2022-04-06 | End: 1900-01-01

## 2022-04-27 ENCOUNTER — TRANSCRIPTION ENCOUNTER (OUTPATIENT)
Age: 62
End: 2022-04-27

## 2022-04-29 ENCOUNTER — APPOINTMENT (OUTPATIENT)
Dept: RHEUMATOLOGY | Facility: CLINIC | Age: 62
End: 2022-04-29
Payer: MEDICAID

## 2022-04-29 ENCOUNTER — LABORATORY RESULT (OUTPATIENT)
Age: 62
End: 2022-04-29

## 2022-04-29 VITALS
TEMPERATURE: 98 F | SYSTOLIC BLOOD PRESSURE: 124 MMHG | HEIGHT: 63 IN | BODY MASS INDEX: 26.05 KG/M2 | DIASTOLIC BLOOD PRESSURE: 82 MMHG | WEIGHT: 147 LBS | HEART RATE: 98 BPM | OXYGEN SATURATION: 99 %

## 2022-04-29 DIAGNOSIS — M17.11 UNILATERAL PRIMARY OSTEOARTHRITIS, RIGHT KNEE: ICD-10-CM

## 2022-04-29 DIAGNOSIS — J31.0 CHRONIC RHINITIS: ICD-10-CM

## 2022-04-29 DIAGNOSIS — Z86.39 PERSONAL HISTORY OF OTHER ENDOCRINE, NUTRITIONAL AND METABOLIC DISEASE: ICD-10-CM

## 2022-04-29 PROCEDURE — 99205 OFFICE O/P NEW HI 60 MIN: CPT | Mod: 25

## 2022-04-29 NOTE — HISTORY OF PRESENT ILLNESS
[Arthralgias] : arthralgias [Difficulty Walking] : difficulty walking [Dry Eyes] : dry eyes [FreeTextEntry1] : Referred by Dr. Titus  for Rheumatology consultation \par \par 60 yo F active smoker,  hx of constipation, migraine HA . She has hx of traumatic fall in 2011 and since the numerous surgeries. \par She had an ACL and meniscus tears on her right knee and had surgery on both. [place] A few months before this second fall she began to have instability in her right knee. She had a second surgery in January 7, 2016. In her left knee she has a slight tear which she has not had surgery on. After the second fall and second right knee surgery she began having hip pain, \par MRI of the bilateral hips  intermediate grade, partial-thickness tearing of the bob-insertional portions of the left Gluteus minimus and medius tendons along the greater trochanter with mild associated peritendinitis. Right hip shows moderate to severe right greater trochanter syndrome with high-grade partial tearing of the gluteus medius lateral and gluteus minimus anterior attachments to the greater trochanter, subgluteal bursitis.\par C spine disease and s/p C4-C5 ACDF surgery. \par b/l knee OA \par carpal tunnel and trigger finger surgery \par Most recently she has been experiencing constipation and bloating, was evaluated by GI Dr. 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  \par Vegan  since 2019 \par She was referred for positive CASIMIRO 1:320 \par Beside some mild pain from previously injured joints/tendons she denies hand/wrist pain or swelling. \par has trouble taking stairs, otherwise no muscle weakness. \par lately easy bruises, no hx of bleeding. \par \par No h/o morning stiffness, memory loss, patchy hair loss, sicca symptoms, photosensitivity, ,  Raynaud's, oral ulcers, nasal ulcers. \par No history of pleuropericarditis \par No history of protein/hematuria \par No history of cytopenias. \par No Hx of VTE/DVT/PE\par Personal or family hx of autoimmune disease, no hx of psoriasis\par  [Anorexia] : no anorexia [Weight Loss] : no weight loss [Malaise] : no malaise [Fever] : no fever [Chills] : no chills [Fatigue] : no fatigue [Depression] : no depression [Malar Facial Rash] : no malar facial rash [Skin Lesions] : no lesions [Skin Nodules] : no skin nodules [Oral Ulcers] : no oral ulcers [Cough] : no cough [Dry Mouth] : no dry mouth [Dysphonia] : no dysphonia [Dysphagia] : no dysphagia [Shortness of Breath] : no shortness of breath [Chest Pain] : no chest pain [Joint Swelling] : no joint swelling [Joint Warmth] : no joint warmth [Joint Deformity] : no joint deformity [Decreased ROM] : no decreased range of motion [Morning Stiffness] : no morning stiffness [Difficulty Standing] : no difficulty standing [Dyspnea] : no dyspnea [Myalgias] : no myalgias [Muscle Weakness] : no muscle weakness [Muscle Spasms] : no muscle spasms [Muscle Cramping] : no muscle cramping [Visual Changes] : no visual changes [Eye Pain] : no eye pain [Eye Redness] : no eye redness

## 2022-04-29 NOTE — ASSESSMENT
[FreeTextEntry1] : 60 yo F active smoker,  hx of constipation due to SIBO,  migraine HA . She has hx of traumatic fall in 2011 and since the numerous surgeries She had an ACL and meniscus tears on her right knee and had surgery on both.\par CTS, trigger finger s/p release. \par Referred for positive CASIMIRO ( low titer) \par ROS and exam unremarkable to suspect underlying SLE/or other CTD\par \par I explained that a positive CASIMIRO may occur secondary to polyclonal activation of the immune system following an infection thyroid disease  or it may be positive without any identifiable reason/disease in approximately 5 - 15% of the population.   There was no evidence of any underlying rheumatologic disease based on history or exam.  I did recommend specific serologic tests to evaluate whether the CASIMIRO is clinically significant.  \par We talked about checking AVISE, C3/C4, APS panel, SPEP, UA, ANCA and tick serologies. \par Blood drawn during the visit today. \par \par RTC in 6 weeks to review work up\par

## 2022-04-29 NOTE — REVIEW OF SYSTEMS
[Abdominal Pain] : abdominal pain [Constipation] : constipation [Heartburn] : heartburn [Anxiety] : anxiety [Negative] : Musculoskeletal [Easy Bruising] : a tendency for easy bruising [Dizziness] : no dizziness [Cough] : no cough [Fainting] : no fainting [Depression] : no depression [Easy Bleeding] : no tendency for easy bleeding [Swollen Glands] : no swollen glands [Swollen Glands In The Neck] : no swollen glands in the neck

## 2022-04-29 NOTE — PHYSICAL EXAM
[General Appearance - Alert] : alert [General Appearance - In No Acute Distress] : in no acute distress [General Appearance - Well Nourished] : well nourished [General Appearance - Well Developed] : well developed [General Appearance - Well-Appearing] : healthy appearing [Sclera] : the sclera and conjunctiva were normal [Outer Ear] : the ears and nose were normal in appearance [Neck Appearance] : the appearance of the neck was normal [Respiration, Rhythm And Depth] : normal respiratory rhythm and effort [Exaggerated Use Of Accessory Muscles For Inspiration] : no accessory muscle use [Auscultation Breath Sounds / Voice Sounds] : lungs were clear to auscultation bilaterally [Heart Sounds] : normal S1 and S2 [Heart Rate And Rhythm] : heart rate was normal and rhythm regular [Murmurs] : no murmurs [Edema] : there was no peripheral edema [Veins - Varicosity Changes] : there were no varicosital changes [Abdomen Soft] : soft [Cervical Lymph Nodes Enlarged Posterior Bilaterally] : posterior cervical [Cervical Lymph Nodes Enlarged Anterior Bilaterally] : anterior cervical [Supraclavicular Lymph Nodes Enlarged Bilaterally] : supraclavicular [Abnormal Walk] : normal gait [Nail Clubbing] : no clubbing  or cyanosis of the fingernails [Musculoskeletal - Swelling] : no joint swelling seen [Motor Tone] : muscle strength and tone were normal [] : no rash [No Focal Deficits] : no focal deficits [Skin Lesions] : no skin lesions [Impaired Insight] : insight and judgment were intact [Affect] : the affect was normal [FreeTextEntry1] : No UE or LE joint tenderness, synovitis or deformity

## 2022-05-06 ENCOUNTER — APPOINTMENT (OUTPATIENT)
Dept: ORTHOPEDIC SURGERY | Facility: CLINIC | Age: 62
End: 2022-05-06
Payer: MEDICARE

## 2022-05-06 ENCOUNTER — NON-APPOINTMENT (OUTPATIENT)
Age: 62
End: 2022-05-06

## 2022-05-06 DIAGNOSIS — M67.52 PLICA SYNDROME, LEFT KNEE: ICD-10-CM

## 2022-05-06 DIAGNOSIS — M17.12 UNILATERAL PRIMARY OSTEOARTHRITIS, LEFT KNEE: ICD-10-CM

## 2022-05-06 LAB
25(OH)D3 SERPL-MCNC: 38.4 NG/ML
A PHAGOCYTOPH IGG TITR SER IF: NORMAL TITER
ALBUMIN MFR SERPL ELPH: 60 %
ALBUMIN SERPL-MCNC: 4.6 G/DL
ALBUMIN/GLOB SERPL: 1.5 RATIO
ALPHA1 GLOB MFR SERPL ELPH: 4 %
ALPHA1 GLOB SERPL ELPH-MCNC: 0.3 G/DL
ALPHA2 GLOB MFR SERPL ELPH: 11.9 %
ALPHA2 GLOB SERPL ELPH-MCNC: 0.9 G/DL
ANCA AB SER-IMP: ABNORMAL
APPEARANCE: CLEAR
B BURGDOR AB SER QL IA: NEGATIVE
B MICROTI IGG TITR SER: NORMAL TITER
B-GLOBULIN MFR SERPL ELPH: 11.3 %
B-GLOBULIN SERPL ELPH-MCNC: 0.9 G/DL
B2 GLYCOPROT1 IGA SERPL IA-ACNC: <5 SAU
BILIRUBIN URINE: NEGATIVE
BLOOD URINE: NEGATIVE
C-ANCA SER-ACNC: NEGATIVE
C3 SERPL-MCNC: 154 MG/DL
C4 SERPL-MCNC: 32 MG/DL
COLOR: NORMAL
CONFIRM: 26.9 SEC
CREAT SPEC-SCNC: 115 MG/DL
CREAT/PROT UR: 0.1 RATIO
DEPRECATED CARDIOLIPIN IGA SER: <5 APL
DEPRECATED KAPPA LC FREE/LAMBDA SER: 1.11 RATIO
DRVVT IMM 1:2 NP PPP: ABNORMAL
DRVVT SCREEN TO CONFIRM RATIO: 1.26 RATIO
E CHAFFEENSIS IGG TITR SER IF: NORMAL TITER
ERYTHROCYTE [SEDIMENTATION RATE] IN BLOOD BY WESTERGREN METHOD: 14 MM/HR
GAMMA GLOB FLD ELPH-MCNC: 1 G/DL
GAMMA GLOB MFR SERPL ELPH: 12.8 %
GLUCOSE QUALITATIVE U: NEGATIVE
IGA SER QL IEP: 141 MG/DL
IGG SER QL IEP: 942 MG/DL
IGM SER QL IEP: 151 MG/DL
INTERPRETATION SERPL IEP-IMP: NORMAL
KAPPA LC CSF-MCNC: 2.42 MG/DL
KAPPA LC SERPL-MCNC: 2.69 MG/DL
KETONES URINE: NEGATIVE
LEUKOCYTE ESTERASE URINE: NEGATIVE
M PROTEIN SPEC IFE-MCNC: NORMAL
NITRITE URINE: NEGATIVE
P-ANCA TITR SER IF: NEGATIVE
PH URINE: 6
PROT SERPL-MCNC: 7.7 G/DL
PROT SERPL-MCNC: 7.7 G/DL
PROT UR-MCNC: 11 MG/DL
PROTEIN URINE: NORMAL
SCREEN DRVVT: 40.6 SEC
SILICA CLOTTING TIME INTERPRETATION: ABNORMAL
SILICA CLOTTING TIME S/C: 1.6 RATIO
SPECIFIC GRAVITY URINE: 1.02
UROBILINOGEN URINE: NORMAL

## 2022-05-06 PROCEDURE — 20611 DRAIN/INJ JOINT/BURSA W/US: CPT | Mod: LT

## 2022-05-06 PROCEDURE — 99212 OFFICE O/P EST SF 10 MIN: CPT | Mod: 25

## 2022-05-06 NOTE — HISTORY OF PRESENT ILLNESS
[de-identified] : The patient is a 61 year old woman presenting with left knee pain.\par \par The patient works as an .  She stands up to 7-8 hours per day at work.\par \par She was last seen in February 2021 for bilateral knee pain.  She was found to have knee osteoarthritis.  SHe has tried HA injections in the past.   The patient denies mechanical symptoms including catching, locking, buckling.  She has tried NSAIDS without relief of symptoms.\par \par She presents with acute on chronic left knee pain after possibly having a slight twist/misstep.  The patient denies mechanical symptoms including catching, locking, buckling.  Mild swelling.  Initial severe pain which has been improving.\par \par

## 2022-05-06 NOTE — DISCUSSION/SUMMARY
[Medication Risks Reviewed] : Medication risks reviewed [de-identified] : The patient is a 61 year old woman presenting with acute on chronic, atraumatic left knee pain likely secondary to mild knee osteoarthritis.  She also has palpable plica today.\par \par Imaging/Diagnostics/Medical Records were interpreted and/or reviewed and results were reviewed with the patient in detail.  All questions were answered appropriately.\par \par The patient was counseled on the natural progression of the problem(s) today and potential complications of diagnoses.  The patient was provided reassurance today.\par \par After informed consent, and explanation of risks, benefits, alternatives, adverse effects of injection, which includes but is not limited to infection, bleeding, allergic reaction, swelling, soft tissue weakening/tendon rupture, neurovascular injury, injection site complication, fat atrophy, skin depigmentation, failure to improve symptoms, the patient would like to proceed with the procedure - LEFT KNEE ULTRASOUND-GUIDED CORTICOSTEROID INJECTION. See procedure note above. Patient tolerated the procedure well. The patient was provided with postinjection instructions.\par \par Patient was prescribed a course of physical therapy today.  The patient was also provided some general home exercises.  The patient was counseled on activity modification.\par \par Follow-up in 4-6 weeks if needed.\par \par \par \par \par ------------------------------------------------------------------------------------------------------------------\par Patient appreciates and agrees with current plan.\par \par The patient's diagnosis above was evaluated by me, personally.  Diagnostic Testing and treatment options were discussed with the patient in detail.  The risks/benefits/potential complications of diagnostic testing/treatments were described in detail.  \par \par This note was generated using a mixture of manual typing and dragon medical dictation software.  A reasonable effort has been made for proofreading its contents, but typos may still remain.  If there are any questions or points of clarification needed please notify my office.\par \par \par >15 minutes of time was spent on total encounter.  >50% of the visit was spent on face-to-face counseling/coordination of care and medical-decision making for this patient.\par

## 2022-05-06 NOTE — PHYSICAL EXAM
[de-identified] : General: Well-nourished, well-developed, alert, and in no acute distress.\par Head: Normocephalic.\par Eyes: Pupils equal round reactive to light and accommodation, extraocular muscles intact, normal sclera.\par Nose: No nasal discharge.\par Cardiac: Regular rate. Extremities are warm and well perfused. Distal pulses are symmetric bilaterally.\par Respiratory: No labored breathing.\par Extremities: Sensation is intact distally bilaterally.  Distal pulses are symmetric bilaterally\par Lymphatic: No regional lymphadenopathy, no lymphedema\par Neurologic: No focal deficits\par Skin: Normal skin color, texture, and turgor\par Psychiatric: Normal affect\par MSK: as noted above/below\par \par \par \par RIGHT KNEE:\par \par Inspection: no bruising, swelling, erythema\par Joint Effusion:no \par ROM: Knee Flexion 120 , Knee Extension 0\par Palpation:, No pain at patellar tendon, MFC/LFC, Medial/Lateral Tibial Plateau\par Leg Length Discrepancy:no\par Patella: no apprehension\par Distal Pulses: normal\par Lower Extremity Strength:normal, 5/5 \par Lower Extremity Reflexes:normal, 2+\par Lower Extremity Sensation: normal\par \par Special Tests:\par [place]:Negative \par Cynthia: Negative\par Anterior Drawer:Negative\par Posterior Drawer:Negative \par Varus/Valgus:Negative, no instability\par \par LEFT KNEE:\par \par Inspection: no bruising, erythema\par Joint Effusion:TRACE\par ROM: Knee Flexion 120 , Knee Extension 0\par Palpation:PALPABLE MEDIAL PATELLA PLICA, MEDIAL JOINT LINE PAIN, PERIPATELLAR PAIN, No pain at patellar tendon, MFC/LFC, Medial/Lateral Tibial Plateau\par Leg Length Discrepancy:no\par Patella: no apprehension\par Distal Pulses: normal\par Lower Extremity Strength:normal, 5/5 \par Lower Extremity Reflexes:normal, 2+\par Lower Extremity Sensation: normal\par \par Special Tests:\par Kosta:Negative \par Cynthia: Negative\par Anterior Drawer:Negative\par Posterior Drawer:Negative \par Varus/Valgus:Negative, no instability\par \par \par

## 2022-05-06 NOTE — PROCEDURE
[de-identified] : Ultrasound-Guided LEFT Knee Injection\par \par Indication for U/S Guidance: Ensure placement within the tibiofemoral joint for diagnostic purposes, while avoiding neurovascular structures\par \par Indication for Injection: MEDIAL PLICA, KNEE OA\par \par A discussion was had with the patient regarding this procedure and all questions were answered. All risks, benefits and alternatives were discussed. These included but were not limited to bleeding, infection, injection site reaction/complication and allergic reaction. A timeout was done to ensure correct side and pt agreed to the procedure. Betadine was used to sterilize and prep the area, and alcohol was used to clean the skin in the anterior aspect of the knee joint. The suprapatellar space was visualized utilizing the Sonosite, linear transducer. The joint was visualized in the short axis and an in-plane approach was used for the injection. Ultrasound guidance was utilized to ensure accuracy of the intra-articular injection and avoid the neurovascular structures. A 22-gauge 1.5" needle was used to inject 2cc of 1% lidocaine without epi into the SubQ.  This was followed by injection with 2cc of 1% lidocaine without epi, 0.5% bupivicaine and 1CC OF KENALOG.  A sterile bandage was then applied. The patient tolerated the procedure well and there were no complications.\par \par \par

## 2022-05-10 ENCOUNTER — NON-APPOINTMENT (OUTPATIENT)
Age: 62
End: 2022-05-10

## 2022-05-12 ENCOUNTER — APPOINTMENT (OUTPATIENT)
Dept: GASTROENTEROLOGY | Facility: CLINIC | Age: 62
End: 2022-05-12
Payer: MEDICARE

## 2022-05-12 VITALS
DIASTOLIC BLOOD PRESSURE: 74 MMHG | SYSTOLIC BLOOD PRESSURE: 118 MMHG | OXYGEN SATURATION: 99 % | HEART RATE: 107 BPM | HEIGHT: 63 IN | TEMPERATURE: 98.3 F | WEIGHT: 147 LBS | BODY MASS INDEX: 26.05 KG/M2

## 2022-05-12 PROCEDURE — 99203 OFFICE O/P NEW LOW 30 MIN: CPT

## 2022-05-12 RX ORDER — LUBIPROSTONE 24 UG/1
24 CAPSULE, GELATIN COATED ORAL TWICE DAILY
Qty: 60 | Refills: 3 | Status: ACTIVE | COMMUNITY
Start: 2022-05-12 | End: 1900-01-01

## 2022-05-19 ENCOUNTER — APPOINTMENT (OUTPATIENT)
Dept: GASTROENTEROLOGY | Facility: CLINIC | Age: 62
End: 2022-05-19
Payer: MEDICARE

## 2022-05-19 PROCEDURE — 99442: CPT

## 2022-05-20 ENCOUNTER — APPOINTMENT (OUTPATIENT)
Dept: RHEUMATOLOGY | Facility: CLINIC | Age: 62
End: 2022-05-20
Payer: MEDICARE

## 2022-05-20 PROCEDURE — 99214 OFFICE O/P EST MOD 30 MIN: CPT | Mod: 95

## 2022-05-20 NOTE — HISTORY OF PRESENT ILLNESS
[Arthralgias] : arthralgias [Difficulty Walking] : difficulty walking [Dry Eyes] : dry eyes [Home] : at home, [unfilled] , at the time of the visit. [Spouse] : spouse [Verbal consent obtained from patient] : the patient, [unfilled] [FreeTextEntry1] : Follow up: 5/20/22 \par \par 60 y/o F here to discuss recent labs. AVISE negative for SLE and other antibodies all negative. \par Noted + LA ( DRVVT and silica clotting time) \par \par \par \par Referred by Dr. Titus  for Rheumatology consultation \par \par 60 yo F active smoker,  hx of constipation, migraine HA . She has hx of traumatic fall in 2011 and since the numerous surgeries. \par She had an ACL and meniscus tears on her right knee and had surgery on both. [place] A few months before this second fall she began to have instability in her right knee. She had a second surgery in January 7, 2016. In her left knee she has a slight tear which she has not had surgery on. After the second fall and second right knee surgery she began having hip pain, \par MRI of the bilateral hips  intermediate grade, partial-thickness tearing of the bob-insertional portions of the left Gluteus minimus and medius tendons along the greater trochanter with mild associated peritendinitis. Right hip shows moderate to severe right greater trochanter syndrome with high-grade partial tearing of the gluteus medius lateral and gluteus minimus anterior attachments to the greater trochanter, subgluteal bursitis.\par C spine disease and s/p C4-C5 ACDF surgery. \par b/l knee OA \par carpal tunnel and trigger finger surgery \par Most recently she has been experiencing constipation and bloating, was evaluated by GI Dr. 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  \par Vegan  since 2019 \par She was referred for positive CASIMIRO 1:320 \par Beside some mild pain from previously injured joints/tendons she denies hand/wrist pain or swelling. \par has trouble taking stairs, otherwise no muscle weakness. \par lately easy bruises, no hx of bleeding. \par \par No h/o morning stiffness, memory loss, patchy hair loss, sicca symptoms, photosensitivity, ,  Raynaud's, oral ulcers, nasal ulcers. \par No history of pleuropericarditis \par No history of protein/hematuria \par No history of cytopenias. \par No Hx of VTE/DVT/PE\par Personal or family hx of autoimmune disease, no hx of psoriasis\par  [Anorexia] : no anorexia [Weight Loss] : no weight loss [Malaise] : no malaise [Fever] : no fever [Chills] : no chills [Fatigue] : no fatigue [Depression] : no depression [Malar Facial Rash] : no malar facial rash [Skin Lesions] : no lesions [Skin Nodules] : no skin nodules [Oral Ulcers] : no oral ulcers [Cough] : no cough [Dry Mouth] : no dry mouth [Dysphonia] : no dysphonia [Dysphagia] : no dysphagia [Shortness of Breath] : no shortness of breath [Chest Pain] : no chest pain [Joint Swelling] : no joint swelling [Joint Warmth] : no joint warmth [Joint Deformity] : no joint deformity [Decreased ROM] : no decreased range of motion [Morning Stiffness] : no morning stiffness [Difficulty Standing] : no difficulty standing [Dyspnea] : no dyspnea [Myalgias] : no myalgias [Muscle Weakness] : no muscle weakness [Muscle Spasms] : no muscle spasms [Muscle Cramping] : no muscle cramping [Visual Changes] : no visual changes [Eye Pain] : no eye pain [Eye Redness] : no eye redness

## 2022-05-20 NOTE — PHYSICAL EXAM
[General Appearance - Alert] : alert [General Appearance - In No Acute Distress] : in no acute distress [] : normal voice and communication [Impaired Insight] : insight and judgment were intact

## 2022-05-20 NOTE — REVIEW OF SYSTEMS
[Abdominal Pain] : abdominal pain [Constipation] : constipation [Heartburn] : heartburn [Anxiety] : anxiety [Easy Bruising] : a tendency for easy bruising [Negative] : Musculoskeletal [Cough] : no cough [Dizziness] : no dizziness [Fainting] : no fainting [Depression] : no depression [Easy Bleeding] : no tendency for easy bleeding [Swollen Glands] : no swollen glands [Swollen Glands In The Neck] : no swollen glands in the neck

## 2022-05-20 NOTE — ASSESSMENT
[FreeTextEntry1] : 62 yo F active smoker,  hx of constipation due to SIBO,  migraine HA . She has hx of traumatic fall in 2011 and since the numerous surgeries She had an ACL and meniscus tears on her right knee and had surgery on both.\par CTS, trigger finger s/p release. \par Referred for positive CASIMIRO ( low titer) \par ROS and exam unremarkable to suspect underlying SLE/or other CTD and her AVISE ( all lupus confirmatory and other rheum serologies negative) \par Noted low titer + LA( both DRVVT and silica) while Cardiolipin and beta2 GP have been normal. \par She has no hx of DVT/PE or other arterial thrombosis/TIA/CVA \par Discussed with Manuela that confirmatory aPL testing - In patients with initial positive testing for aPL, testing should be repeated after at least 12 weeks to confirm persistence LA test. Transiently elevated levels of IgG or IgM aCL, as well as a positive LA test, can occur in the setting of some infections or drug exposures. \par Positive results from aPL testing on two tests =12 weeks apart satisfies the laboratory criteria for the classification of APS\par Will order repeat APL labs. \par educated pt about possible clot- DVT/PE and immediate need to ER visit if any concerning symptoms. \par \par I explained that a positive CASIMIRO may occur secondary to polyclonal activation of the immune system without any identifiable reason/disease in approximately 5 - 15% of the population.   There was no evidence of any underlying rheumatologic disease based on history or exam. \par \par \par f/u 2 months \par \par

## 2022-05-23 ENCOUNTER — APPOINTMENT (OUTPATIENT)
Dept: PHYSICAL MEDICINE AND REHAB | Facility: CLINIC | Age: 62
End: 2022-05-23
Payer: MEDICARE

## 2022-05-23 VITALS
BODY MASS INDEX: 26.4 KG/M2 | DIASTOLIC BLOOD PRESSURE: 80 MMHG | SYSTOLIC BLOOD PRESSURE: 128 MMHG | HEIGHT: 63 IN | OXYGEN SATURATION: 99 % | WEIGHT: 149 LBS | HEART RATE: 74 BPM

## 2022-05-23 DIAGNOSIS — M50.320 OTHER CERVICAL DISC DEGENERATION, MID-CERVICAL REGION, UNSPECIFIED LEVEL: ICD-10-CM

## 2022-05-23 PROCEDURE — 99204 OFFICE O/P NEW MOD 45 MIN: CPT

## 2022-05-23 NOTE — PHYSICAL EXAM
[FreeTextEntry1] : CERVICAL\par GEN: AAOx3. NAD.\par CERVICAL ROM: Flexion, extension, side-bending, rotation, oblique extension all full/pain free.  \par SHOULDER ROM: Full and pain free B/L.\par PALP: (+) TTP B-Traps/LevScap/Paraspinals. No TTP midline spinous processes.\par INSP: Spine alignment is midline, No evidence of scoliosis.\par STRENGTH: 4+/5 L-. Otherwise 5/5 BUE\par TONE: Normal, No clonus.\par REFLEXES: Symmetric biceps, triceps, brachioradialis, pronator teres. Farris's (-) B/L.\par SENSATION: Grossly intact LT BUE.\par GAIT: Non antalgic, Normal reciprocating heel to toe.\par SPECIAL: Spurling's (-) B/L. Axial Compression (-). \par \par \par

## 2022-05-23 NOTE — DATA REVIEWED
[FreeTextEntry1] : XR CS 12/2021: Status post anterior cervical fusion and discectomy at C4-5 with interbody disc spacer. Moderate to severe degenerative disc disease at C5-6 and C6-7.

## 2022-05-23 NOTE — ASSESSMENT
[FreeTextEntry1] : Impression:\par 1. C5/6 Degenerative Disc Disease \par \par Plan: The history and physical examination were reviewed along with available imaging. Symptoms seem consistent with myofascial pain syndrome along with underlying cervical DDD and possible RIGHT C5/6 radiculopathy. The diagnosis was discussed with the patient along with treatment options including physical therapy, oral medication, interventional spine procedures, and surgery; as well as alternative therapeutics such as acupuncture and massage. We also discussed the importance of activity modification, ergonomics and posture in the long term management of the condition. At this time the patient has done >6 weeks of conservative treatment with therapeutic exercise, acupuncture, TPI, activity modification, and oral medication without sustained improvement. Therefore I am recommending that the patient undergo an MRI of the CSpine to further evaluate for disc pathology and nerve root involvement in preparation for FIORELLA. The patient was educated on red flag symptoms and was instructed to call the office should the current condition worsen or new symptoms develop. The patient will follow up for imaging review. The patient expressed verbal understanding and is in agreement with the plan of care. All of the patient's questions and concerns were addressed during today's visit.\par

## 2022-05-23 NOTE — HISTORY OF PRESENT ILLNESS
[FreeTextEntry1] : Referring Physician: Dr. Natalio Jean-Baptiste\par \par Ms. PHYLLIS MERRITT is a very pleasant 61 year female who comes in for evaluation of Neck pain that has been ongoing for 1 year without any specific injury or inciting event. Patient has has ACDF at C4/5 in the past, PT, Acupuncture, Trigger Point Injection which did not help. The pain is located primarily Neck Pain radiating to both Shoulder/Arms intermittent and described as sharp . The pain is rated as 710 and ranges from 5-9/10. The patient's symptoms are aggravated by working  and alleviated by nothing . The patient works as a   which consists of standing, using of Hands, bending. The patient denies any night pain, numbness/tingling, weakness, or bowel/bladder dysfunction. The patient has no other complaints at this time.\par \par

## 2022-05-26 NOTE — ASSESSMENT
[FreeTextEntry1] : This is a 61 year old female here for a second opinion of IBS.\par \par My plan\par -stop Linzess\par -Trial of Amitiza 24mcg with food\par -consider MR defecography if Amitiza does not work\par -f/u in 3-4 weeks

## 2022-05-26 NOTE — HISTORY OF PRESENT ILLNESS
[de-identified] : This is a 61 year old female here for a second opinion of IBS. PMH of IBS-C, methane + SIBO s/p Xifaxan, bipolar, depression, anxiety, endometriosis, spinal surgery. Mom with colon cancer in her 60s. Denies a family history of , gastric CA, high risk polyps, Inflammatory and autoimmune disease. Patient has been seeing Dr. Titus as her GI previously. She has been troubled with IBS-C for several years and has tried Miralax and numerous other agents with failed attempt. She currently is ion Linzess 290mg BID. She has diarrhea for 2-3 hours post Linzess. One 290 mg does not work. \par Anorectal manometry in 2021 with low rectal anal pressure \par Last colonoscopy 2019 with polyps and EGD 2019 with antrum ulcer.\par Smoke: 40 years 0.5 packs daily\par Etoh: socially

## 2022-05-31 ENCOUNTER — APPOINTMENT (OUTPATIENT)
Dept: GASTROENTEROLOGY | Facility: CLINIC | Age: 62
End: 2022-05-31
Payer: MEDICARE

## 2022-05-31 VITALS
WEIGHT: 145 LBS | HEIGHT: 63 IN | SYSTOLIC BLOOD PRESSURE: 135 MMHG | DIASTOLIC BLOOD PRESSURE: 84 MMHG | HEART RATE: 80 BPM | TEMPERATURE: 97.2 F | BODY MASS INDEX: 25.69 KG/M2 | OXYGEN SATURATION: 96 %

## 2022-05-31 PROCEDURE — 99213 OFFICE O/P EST LOW 20 MIN: CPT

## 2022-06-02 ENCOUNTER — OUTPATIENT (OUTPATIENT)
Dept: OUTPATIENT SERVICES | Facility: HOSPITAL | Age: 62
LOS: 1 days | End: 2022-06-02
Payer: COMMERCIAL

## 2022-06-02 ENCOUNTER — APPOINTMENT (OUTPATIENT)
Dept: MRI IMAGING | Facility: HOSPITAL | Age: 62
End: 2022-06-02
Payer: MEDICARE

## 2022-06-02 DIAGNOSIS — M54.12 RADICULOPATHY, CERVICAL REGION: ICD-10-CM

## 2022-06-02 DIAGNOSIS — Z98.89 OTHER SPECIFIED POSTPROCEDURAL STATES: Chronic | ICD-10-CM

## 2022-06-02 PROCEDURE — 72141 MRI NECK SPINE W/O DYE: CPT

## 2022-06-02 PROCEDURE — 72141 MRI NECK SPINE W/O DYE: CPT | Mod: 26

## 2022-06-06 ENCOUNTER — APPOINTMENT (OUTPATIENT)
Dept: OPHTHALMOLOGY | Facility: CLINIC | Age: 62
End: 2022-06-06

## 2022-06-07 NOTE — HISTORY OF PRESENT ILLNESS
[de-identified] : This is a 62 year old female here for a follow up of IBS. She was started on Amitizia BID on last visit. She stated the BID Amitizia was causing her more bloating. She self cut down to daily and taking Motegrity. She feels much better now. BMs 1-2 soft per day. No more abdominal discomfort. Has some reflux and was on Pantoprazole before which helped.

## 2022-06-07 NOTE — ASSESSMENT
[FreeTextEntry1] : This is a 62 year old female here for a follow up of IBS.\par \par My plan\par -Amitizia daily\par -Pantoprazole 20mg daily\par -c/w Motegrity\par -F/u in 8 weeks \par

## 2022-06-13 ENCOUNTER — APPOINTMENT (OUTPATIENT)
Dept: PHYSICAL MEDICINE AND REHAB | Facility: CLINIC | Age: 62
End: 2022-06-13
Payer: MEDICARE

## 2022-06-13 VITALS
HEART RATE: 64 BPM | BODY MASS INDEX: 26.58 KG/M2 | DIASTOLIC BLOOD PRESSURE: 82 MMHG | OXYGEN SATURATION: 96 % | SYSTOLIC BLOOD PRESSURE: 136 MMHG | HEIGHT: 63 IN | WEIGHT: 150 LBS

## 2022-06-13 PROCEDURE — 99214 OFFICE O/P EST MOD 30 MIN: CPT

## 2022-06-13 NOTE — HISTORY OF PRESENT ILLNESS
[FreeTextEntry1] : 06/13/2022: Patient is here for imaging review. Presently symptoms are relatively unchanged. The patient has not completed any treatment since the previous visit. The patient has no new complaints at this time. \par \par 05/23/2022: Ms. PHYLLIS MERRITT is a very pleasant 61 year female who comes in for evaluation of Neck pain that has been ongoing for 1 year without any specific injury or inciting event. Patient has has ACDF at C4/5 in the past, PT, Acupuncture, Trigger Point Injection which did not help. The pain is located primarily Neck Pain radiating to both Shoulder/Arms intermittent and described as sharp . The pain is rated as 710 and ranges from 5-9/10. The patient's symptoms are aggravated by working  and alleviated by nothing . The patient works as a   which consists of standing, using of Hands, bending. The patient denies any night pain, numbness/tingling, weakness, or bowel/bladder dysfunction. The patient has no other complaints at this time.\par \par

## 2022-06-13 NOTE — DATA REVIEWED
[MRI] : MRI [FreeTextEntry1] : MRI CS 06/2022: C3/4 facet arthrosis and severe left foraminal narrowing. C4/5 Status post ACDF. C5/6 disc osteophyte complex with right foraminal osteophyte and severe right foraminal narrowing. C6/7 left uncovertebral spurring, left foraminal narrowing.

## 2022-06-13 NOTE — ASSESSMENT
[FreeTextEntry1] : Impression:\par 1. RIGHT C5/6 Radiculopathy \par \par Plan: The history and physical examination were reviewed along with new imaging. The imaging results and diagnosis were discussed with the patient along with treatment options. I explained to the patient that due to the osteophytic component of her radiculopathy the anticipated success with FIORELLA is less than ideal. We will plan for trial RIGHT C7-T1 ILESI. We discussed all the risks, benefits, alternative treatments, and prognosis. The patient expressed understanding and would like to move forward. We will schedule for the injection as well as follow up post-procedure. The patient has a follow up appointment with her spine surgeon to discuss further options. The patient expressed verbal understanding and is in agreement with the plan of care. All of the patient's questions and concerns were addressed during today's visit.

## 2022-06-14 ENCOUNTER — APPOINTMENT (OUTPATIENT)
Dept: COLORECTAL SURGERY | Facility: CLINIC | Age: 62
End: 2022-06-14
Payer: MEDICARE

## 2022-06-14 ENCOUNTER — NON-APPOINTMENT (OUTPATIENT)
Age: 62
End: 2022-06-14

## 2022-06-14 VITALS
BODY MASS INDEX: 26.58 KG/M2 | HEART RATE: 83 BPM | TEMPERATURE: 97.8 F | HEIGHT: 63 IN | DIASTOLIC BLOOD PRESSURE: 75 MMHG | WEIGHT: 150 LBS | SYSTOLIC BLOOD PRESSURE: 109 MMHG

## 2022-06-14 DIAGNOSIS — K62.3 RECTAL PROLAPSE: ICD-10-CM

## 2022-06-14 DIAGNOSIS — K59.09 OTHER CONSTIPATION: ICD-10-CM

## 2022-06-14 DIAGNOSIS — K59.9 FUNCTIONAL INTESTINAL DISORDER, UNSPECIFIED: ICD-10-CM

## 2022-06-14 PROCEDURE — 99215 OFFICE O/P EST HI 40 MIN: CPT

## 2022-06-14 NOTE — PHYSICAL EXAM
[FreeTextEntry1] : Medical assistant present for duration of physical examination\par \par General no acute distress, alert and oriented\par Psych pleasant, in good spirits\par Nonlabored breathing\par Ambulating without assistance\par Skin normal color and pigment\par \par Anorectal Exam:\par Inspection soft external hemorrhoidal cushions\par When asked to Valsalva, 3-4 cm of full thickness rectal prolapse is observed. Reduces spontaneously. \par MAYKEL nontender, no masses palpated, no blood on gloved finger

## 2022-06-14 NOTE — ASSESSMENT
[FreeTextEntry1] : Exam findings and diagnosis were discussed at length with patient.\par \par H/o chronic constipation, GI motility disorder and pelvic floor dysfunction.\par Established care with new GI since last visit - Dr Granda.\par Currently on Linzess 290 mcg, Motegrity 5 mg in evening. No longer taking magnesium or miralax. \par She is doing home Pelvic PT exercises.\par She reports urinary symptoms (difficult evacuation, denies incontinence) and is interested in second opinion by uro-gyn. Recommendation provided. \par \par Rectal prolapse seen on today's examination.\par \par Discussed conservative management, including GI/bowel regimen and pelvic floor physical therapy and manual reduction techniques. \par \par She expresses that her symptoms are impacting her quality of life and she would like to consider surgery. \par \par I have detailed to her the options for surgical correction of rectal prolapse, including abdominal and perineal approaches.  I recommended that the patient consider proceeding with robotic assisted laparoscopic sigmoid colon resection with rectopexy, possible open procedure.\par \par The associated risks, benefits, alternatives of the procedure have been outlined discussed and reviewed with the patient. These risks including but not limited to bleeding, infection, injury to adjacent organs, anastomotic leak, need for secondary surgery, need for ileostomy or colostomy creation, incisional dehiscence, incisional hernia, change in urinary function, nerve injury, change in sexual function, change in bowel habits, altered GI function postoperatively including continued diarrhea or constipation, need for prolonged and continued medical therapy,  risk of recurrent prolapse, as well as the risk of heart and lung complications, stroke, DVT/PE and death were detailed. \par \par All questions were answered, patient expressed understanding.\par \par She is interested in surgery, but would like more time to consider her management options.\par She would like to f/u with her GI and to seek consultation by Uro-gyn in meantime.\par \par She will follow up after seeing those providers if she would like to continue with surgical planning.\par \par \par \par \par

## 2022-06-14 NOTE — HISTORY OF PRESENT ILLNESS
[FreeTextEntry1] : 61 y/o F presents for evaluation of possible rectal prolapse \par H/o IBS-C, bi-polar disorder, Endometriosis, depression/Anxiety\par PSH of Laparoscopy for endometriosis (2000), multiple orthopedic surgeries. \par \par Initially seen 12/21/21: h/o IBD seen by multiple GI providers, urogynecologist for voiding dysfunction, urodynamic testing and cystoscopy wnl. Attended pelvic floor PT for pelvic floor dysfunction diagnosed on ARM. Completed several session. \par \par Last EGD/colonoscopy 12/16/2019 w/ Dr. Marisol Saab\par - sliding hiatal hernia\par - multiple linear erosions in the esophagus consistent with esophagitis\par - single 3 mm ulcer found in the antrum of the stomach\par - localized erythema compatible with gastritis\par - few non-bleeding diverticula in the sigmoid colon\par - single 3 mm non-bleeding polyps in the sigmoid colon, resected and retrieved\par - small internal hemorrhoids\par \par EGD repeated March 2021 by Dr Saab - esophageal hital hernia, duodenum appeared normal without evidence of ulceration or mass lesion, stomach appeared normal. the gastric folds were soft and easily distensible. no ulcers, mass lesions or angioectasias were seen.\par \par Referred for evaluation possible rectal prolapse vs hemorrhoidal prolapse. At December 2021 visit Soft external hemorrhoidal cushions engorge when asked to Valsalva, no full thickness rectal prolapse observed w/Valsalva. Mild internal hemorrhoids, no visible masses or lesions in anal canal, redundant mucosa noted with Valsalva without prolapse from anal canal on anoscopy. \par  Pt expressed interest in establishing care with new GI at [facility], referral provided for Dr. Titus, Director of neurogastroenterology and motility. \par \par She established care with Dr Titus in Feb 2022\par She was recommended to resume pelvic floor PT, and to continue Linzess and motegrity and miralax (linzess 145, motegrity 2, titrate miralax 1-2 caps/day, magnesium ox 500, no dig enzymes, eat pre/probiotics, consider psyllium husk)\par \par Xray obtained in March 31 2022 \par IMPRESSION: No obstruction or free air. Moderate stool burden.showed moderate stool burden.\par \par Follow up with Dr Titus in March 2022 - Motegrity was stopped for concern of contributing to anxiety. Continued on linzess 290, miralax 34g, mg ox 500, pelvic floor PT\par \par She then saw a second opinion by GI Dr. Brayan Granda for IBS. Patient started on Amitizia BID during initial visit 5/19/22, however caused her bloating and patient self cut down dose to QD and Motegrity with improvement of symptoms. Patient was recommended amitizia daily, pantoprazole 20mg, continue with motegrity, and scheduled to return in 8 weeks. \par \par Seeing an acupuncturist as well, started on herbal supplements for digestion. (cinnamon and peony)\par \par She returns today c/o continued prolapse and excess skin and feels the tissue is blocking her BM. Feels "something is inside," mildly improved after defecation\par Reports possibly has some swollen tissue, associated w/ irritation. Uses A&D cream w/ improvement\par Denies manual reduction of tissue\par Denies rectal bleeding\par \par Admits to straining w/ some BMs and has to strain during urination. Wondering if related to her rectal symptoms, but was advised by URO GYN that testing normal\par Has not completed MR Defecography.\par \par BH: daily w/ Linzess 290 mcg, Motegrity 5 mg in evening. No longer taking magnesium or miralax\par Has f/u w/ GI [facility] 7/12\par Denies ASA/NSAID use

## 2022-06-19 LAB — SARS-COV-2 N GENE NPH QL NAA+PROBE: NOT DETECTED

## 2022-06-21 ENCOUNTER — APPOINTMENT (OUTPATIENT)
Dept: PHYSICAL MEDICINE AND REHAB | Facility: CLINIC | Age: 62
End: 2022-06-21
Payer: MEDICARE

## 2022-06-21 PROCEDURE — 20610 DRAIN/INJ JOINT/BURSA W/O US: CPT | Mod: LT

## 2022-07-08 ENCOUNTER — NON-APPOINTMENT (OUTPATIENT)
Age: 62
End: 2022-07-08

## 2022-07-15 ENCOUNTER — APPOINTMENT (OUTPATIENT)
Dept: PHYSICAL MEDICINE AND REHAB | Facility: CLINIC | Age: 62
End: 2022-07-15

## 2022-07-15 VITALS — BODY MASS INDEX: 26.05 KG/M2 | HEIGHT: 63 IN | WEIGHT: 147 LBS

## 2022-07-15 PROCEDURE — 99213 OFFICE O/P EST LOW 20 MIN: CPT

## 2022-07-22 NOTE — HISTORY OF PRESENT ILLNESS
[FreeTextEntry1] : 07/22/2022: Patient is here for follow up after undergoing an FIORELLA on 06/21/22. The patient reports no significant improvement following the procedure. The patient continues to have neck pain radiating to R>L arms/hands and is having tremendous difficulty working. The patient has no new complaints today. \par  \par 06/13/2022: Patient is here for imaging review. Presently symptoms are relatively unchanged. The patient has not completed any treatment since the previous visit. The patient has no new complaints at this time. \par \par 05/23/2022: Ms. PHYLLIS MERRITT is a very pleasant 61 year female who comes in for evaluation of Neck pain that has been ongoing for 1 year without any specific injury or inciting event. Patient has has ACDF at C4/5 in the past, PT, Acupuncture, Trigger Point Injection which did not help. The pain is located primarily Neck Pain radiating to both Shoulder/Arms intermittent and described as sharp . The pain is rated as 710 and ranges from 5-9/10. The patient's symptoms are aggravated by working  and alleviated by nothing . The patient works as a   which consists of standing, using of Hands, bending. The patient denies any night pain, numbness/tingling, weakness, or bowel/bladder dysfunction. The patient has no other complaints at this time.\par \par

## 2022-07-22 NOTE — ASSESSMENT
[FreeTextEntry1] : Impression:\par 1. RIGHT C5/6 Radiculopathy \par \par Plan: The history and physical examination were reviewed along with imaging. The imaging results and diagnosis were discussed with the patient along with treatment options. The patient did not have the anticipated response to FIORELLA. I explained to the patient that due to the osteophytic component of her radiculopathy she would likely require surgical intervention. The patient provided with a referral to Dr. Arana to discuss further options. The patient expressed verbal understanding and is in agreement with the plan of care. All of the patient's questions and concerns were addressed during today's visit.

## 2022-07-26 ENCOUNTER — APPOINTMENT (OUTPATIENT)
Dept: RHEUMATOLOGY | Facility: CLINIC | Age: 62
End: 2022-07-26

## 2022-07-26 VITALS
OXYGEN SATURATION: 96 % | SYSTOLIC BLOOD PRESSURE: 121 MMHG | WEIGHT: 145 LBS | TEMPERATURE: 98.2 F | HEIGHT: 63 IN | DIASTOLIC BLOOD PRESSURE: 77 MMHG | BODY MASS INDEX: 25.69 KG/M2 | HEART RATE: 78 BPM

## 2022-07-26 DIAGNOSIS — M54.12 RADICULOPATHY, CERVICAL REGION: ICD-10-CM

## 2022-07-26 PROCEDURE — 36415 COLL VENOUS BLD VENIPUNCTURE: CPT

## 2022-07-26 PROCEDURE — 99214 OFFICE O/P EST MOD 30 MIN: CPT | Mod: 25

## 2022-07-26 NOTE — ASSESSMENT
[FreeTextEntry1] : 61 yo F active smoker,  hx of constipation due to SIBO,  migraine HA . She has hx of traumatic fall in 2011 and since the numerous surgeries She had an ACL and meniscus tears on her right knee and had surgery on both.\par CTS, trigger finger s/p release. \par Referred for positive CASIMIRO ( low titer) \par ROS and exam unremarkable to suspect underlying SLE/or other CTD and her AVISE ( all lupus confirmatory and other rheum serologies negative) \par Noted low titer + LA( both DRVVT and silica) while Cardiolipin and beta2 GP have been normal. \par She has no hx of DVT/PE or other arterial thrombosis/TIA/CVA \par Will repeat  aPL testing today. \par Blood drawn during the visit today.\par educated pt about possible clot- DVT/PE and immediate need to ER visit if any concerning symptoms. \par \par I advised  to call if new concerning symptoms arise (i.e. persistent unexplained fever, weight loss, unusual rash, mouth sores, joint pain/swelling/ warmth/ stiffness) so that reevaluation can be arranged as these symptoms may be a sign of a newly developed problem.\par \par She has uncontrolled cervical pain and radiculopathy, not responded to FIORELLA and she will meet with Dr. Arana for the discussion of possible surgical intervention.\par I have explained pt that uncontrolled HA maybe somewhat related to cervical pain. \par She will try to make sooner appt with Dr. Ojeda for second opinion. \par \par \par \par f/u prn if positive APS labs. \par \par

## 2022-07-26 NOTE — PHYSICAL EXAM
[General Appearance - Alert] : alert [General Appearance - In No Acute Distress] : in no acute distress [Impaired Insight] : insight and judgment were intact [Sclera] : the sclera and conjunctiva were normal [Neck Appearance] : the appearance of the neck was normal [Respiration, Rhythm And Depth] : normal respiratory rhythm and effort [Exaggerated Use Of Accessory Muscles For Inspiration] : no accessory muscle use [Heart Rate And Rhythm] : heart rate was normal and rhythm regular [Musculoskeletal - Swelling] : no joint swelling seen [] : no rash

## 2022-07-26 NOTE — HISTORY OF PRESENT ILLNESS
[Arthralgias] : arthralgias [Difficulty Walking] : difficulty walking [Dry Eyes] : dry eyes [FreeTextEntry1] : Follow up: 7/26/22\par \par 61 y/o F here to discuss recent labs. AVISE negative for SLE and other antibodies all negative. \par Noted + LA ( DRVVT and silica clotting time) \par She has uncontrolled cervical pain and radiculopathy, not responded to FIORELLA and she will meet with Dr. Arana for the discussion of possible surgical intervention. \par uncontrolled HA, tried and failed multiple migraine HA, pending second opinion with Dr. Ojeda, could not get appointment sooner than December. \par She is here for repeat APS labs. \par \par \par Follow up: 5/20/22 \par \par 60 y/o F here to discuss recent labs. AVISE negative for SLE and other antibodies all negative. \par Noted + LA ( DRVVT and silica clotting time) \par \par \par \par Referred by Dr. Titus  for Rheumatology consultation \par \par 60 yo F active smoker,  hx of constipation, migraine HA . She has hx of traumatic fall in 2011 and since the numerous surgeries. \par She had an ACL and meniscus tears on her right knee and had surgery on both. [place] A few months before this second fall she began to have instability in her right knee. She had a second surgery in January 7, 2016. In her left knee she has a slight tear which she has not had surgery on. After the second fall and second right knee surgery she began having hip pain, \par MRI of the bilateral hips  intermediate grade, partial-thickness tearing of the bob-insertional portions of the left Gluteus minimus and medius tendons along the greater trochanter with mild associated peritendinitis. Right hip shows moderate to severe right greater trochanter syndrome with high-grade partial tearing of the gluteus medius lateral and gluteus minimus anterior attachments to the greater trochanter, subgluteal bursitis.\par C spine disease and s/p C4-C5 ACDF surgery. \par b/l knee OA \par carpal tunnel and trigger finger surgery \par Most recently she has been experiencing constipation and bloating, was evaluated by GI Dr. 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  \par Vegan  since 2019 \par She was referred for positive CASIMIRO 1:320 \par Beside some mild pain from previously injured joints/tendons she denies hand/wrist pain or swelling. \par has trouble taking stairs, otherwise no muscle weakness. \par lately easy bruises, no hx of bleeding. \par \par No h/o morning stiffness, memory loss, patchy hair loss, sicca symptoms, photosensitivity, ,  Raynaud's, oral ulcers, nasal ulcers. \par No history of pleuropericarditis \par No history of protein/hematuria \par No history of cytopenias. \par No Hx of VTE/DVT/PE\par Personal or family hx of autoimmune disease, no hx of psoriasis\par  [___ Month(s) Ago] : [unfilled] month(s) ago [Anorexia] : no anorexia [Weight Loss] : no weight loss [Malaise] : no malaise [Fever] : no fever [Chills] : no chills [Fatigue] : no fatigue [Depression] : no depression [Malar Facial Rash] : no malar facial rash [Skin Lesions] : no lesions [Skin Nodules] : no skin nodules [Oral Ulcers] : no oral ulcers [Cough] : no cough [Dry Mouth] : no dry mouth [Dysphonia] : no dysphonia [Dysphagia] : no dysphagia [Shortness of Breath] : no shortness of breath [Chest Pain] : no chest pain [Joint Swelling] : no joint swelling [Joint Warmth] : no joint warmth [Joint Deformity] : no joint deformity [Morning Stiffness] : no morning stiffness [Decreased ROM] : no decreased range of motion [Difficulty Standing] : no difficulty standing [Dyspnea] : no dyspnea [Myalgias] : no myalgias [Muscle Weakness] : no muscle weakness [Muscle Spasms] : no muscle spasms [Muscle Cramping] : no muscle cramping [Visual Changes] : no visual changes [Eye Pain] : no eye pain [Eye Redness] : no eye redness

## 2022-07-27 ENCOUNTER — APPOINTMENT (OUTPATIENT)
Dept: ORTHOPEDIC SURGERY | Facility: CLINIC | Age: 62
End: 2022-07-27

## 2022-07-27 VITALS
WEIGHT: 145 LBS | HEART RATE: 79 BPM | HEIGHT: 63 IN | BODY MASS INDEX: 25.69 KG/M2 | OXYGEN SATURATION: 97 % | SYSTOLIC BLOOD PRESSURE: 99 MMHG | DIASTOLIC BLOOD PRESSURE: 65 MMHG

## 2022-07-27 DIAGNOSIS — Z98.1 ARTHRODESIS STATUS: ICD-10-CM

## 2022-07-27 LAB
APPEARANCE: CLEAR
BILIRUBIN URINE: NEGATIVE
BLOOD URINE: NEGATIVE
COLOR: NORMAL
GLUCOSE QUALITATIVE U: NEGATIVE
KETONES URINE: NEGATIVE
LEUKOCYTE ESTERASE URINE: NEGATIVE
NITRITE URINE: NEGATIVE
PH URINE: 6.5
PROTEIN URINE: NEGATIVE
SPECIFIC GRAVITY URINE: 1.01
UROBILINOGEN URINE: NORMAL

## 2022-07-27 PROCEDURE — 99214 OFFICE O/P EST MOD 30 MIN: CPT

## 2022-07-29 DIAGNOSIS — R76.0 RAISED ANTIBODY TITER: ICD-10-CM

## 2022-07-29 DIAGNOSIS — R76.8 OTHER SPECIFIED ABNORMAL IMMUNOLOGICAL FINDINGS IN SERUM: ICD-10-CM

## 2022-07-29 LAB
B2 GLYCOPROT1 IGA SERPL IA-ACNC: <5 SAU
B2 GLYCOPROT1 IGG SER-ACNC: 6.6 SGU
B2 GLYCOPROT1 IGM SER-ACNC: <5 SMU
CARDIOLIPIN IGM SER-MCNC: 12.8 MPL
CARDIOLIPIN IGM SER-MCNC: <5 GPL
CONFIRM: 27.7 SEC
DEPRECATED CARDIOLIPIN IGA SER: <5 APL
DRVVT IMM 1:2 NP PPP: NORMAL
DRVVT SCREEN TO CONFIRM RATIO: 1.19 RATIO
SCREEN DRVVT: 39.4 SEC
SILICA CLOTTING TIME INTERPRETATION: ABNORMAL
SILICA CLOTTING TIME S/C: 1.74 RATIO

## 2022-07-29 RX ORDER — ASPIRIN 81 MG/1
81 TABLET, CHEWABLE ORAL DAILY
Qty: 30 | Refills: 6 | Status: ACTIVE | COMMUNITY
Start: 2022-07-29 | End: 1900-01-01

## 2022-07-29 RX ORDER — HYDROXYCHLOROQUINE SULFATE 200 MG/1
200 TABLET, FILM COATED ORAL
Qty: 30 | Refills: 6 | Status: ACTIVE | COMMUNITY
Start: 2022-07-29 | End: 1900-01-01

## 2022-08-09 ENCOUNTER — APPOINTMENT (OUTPATIENT)
Dept: GASTROENTEROLOGY | Facility: CLINIC | Age: 62
End: 2022-08-09

## 2022-08-09 VITALS
WEIGHT: 148 LBS | DIASTOLIC BLOOD PRESSURE: 76 MMHG | HEART RATE: 79 BPM | SYSTOLIC BLOOD PRESSURE: 118 MMHG | OXYGEN SATURATION: 95 % | HEIGHT: 63 IN | BODY MASS INDEX: 26.22 KG/M2

## 2022-08-09 PROCEDURE — 99213 OFFICE O/P EST LOW 20 MIN: CPT

## 2022-08-12 PROBLEM — Z98.1 S/P CERVICAL SPINAL FUSION: Status: ACTIVE | Noted: 2022-08-12

## 2022-08-12 NOTE — HISTORY OF PRESENT ILLNESS
[de-identified] : 62F with previous ACDF C4-5 at [facility] performed 2 years ago who presents with bilateral upper extremity radiculopathy. Patient works as an  and has been limited to working only two times a week due to the amount of radiculopathy that she feels after a full day of work. She states that the pain/radiculopathy feels almost constant and takes her two days to recover after a day of work. Denies any trauma.

## 2022-08-12 NOTE — PHYSICAL EXAM
[de-identified] : General: patient is well developed, well nourished, in no acute \par distress, alert and oriented x 3. \par \par Mood and affect: normal\par \par Respiratory: no respiratory distress noted\par \par Skin: no scars over spine, skin intact, no erythema, increased warmth\par \par Alignment:The spine is well compensated in the coronal and sagittal plane. \par \par Gait: The patient is able to toe walk and heel walk without difficulty. \par \par Palpation: no tenderness to palpation spine or paraspinal region\par \par Range of motion: Lumbar spine ROM is restricted\par \par Neurologic Exam:\par Motor: Manual Muscle testing in the lower extremities is 5 out of 5 in all muscle groups. There is no evidence of muscular atrophy in the lower extremities. Sensory: Sensation to light touch is grossly intact in the lower extremities\par \par Reflexes: DTR are present and symmetric throughout\par \par Hip Exam: No pain with internal or external rotation of hips bilaterally\par \par Special tests: Straight leg raise test negative. Cross straight leg test negative. \par \par Vascular: Examination of the peripheral vascular system demonstrates no evidence of congestion or edema. no evidence of lymphedema bilateral lower extremities, pulses are present and symmetric in both lower extremities.\par  [de-identified] : MRI cervical spine (my read): 6/2/22\par Shows s/p ACDF C4-5, central canal stenosis with left foraminal stenosis noted at C3-4, and C5-6\par \par XR cervical spine shows fusion at C4-5\par Degenratve disease at C3-4, C5-7 with osteophyte

## 2022-08-12 NOTE — DISCUSSION/SUMMARY
[de-identified] : 62F with previous ACDF C4-5 in 2020 at [facility] now presenting with bilateral upper extremity radiculopathy and MRI findings significant for \par Thorough discussion was had with patient about treatment options and she has elected to proceed with nonoperative management. She will attempt lifestyle modifications because it seems that her line of work is an inciting factor for the the of pain that she is experiencing. She will see her neurologist for workup of the migraines that she is experiencing and also visit her original surgeon at [facility] for followup. She will followup here as needed, sooner if any issues result. All questions answered.

## 2022-08-16 NOTE — HISTORY OF PRESENT ILLNESS
[de-identified] : This is a 62 year old female here for a follow up of constipation. Since last visit she feels great. She has self stopped taking Linzess, Motegrity and Amitizia. She has Normal BMs daily. No abdominal pain. No blood or dark tarry stools. No rectal pain. She recently saw colorectal surgeon Dr. Sanchez for possible rectal prolapse repair. She now wants a 2nd opinion for rectal prolapse repair.  No other issues

## 2022-08-16 NOTE — ASSESSMENT
[FreeTextEntry1] : This is a 62 year old female here for a follow up of constipation. \par \par My plan\par -referral to Dr. Rubio for 2nd opinion \par -f/u in 12 months unless symptomatic

## 2022-09-06 ENCOUNTER — APPOINTMENT (OUTPATIENT)
Dept: SURGERY | Facility: CLINIC | Age: 62
End: 2022-09-06

## 2022-09-06 VITALS
DIASTOLIC BLOOD PRESSURE: 81 MMHG | WEIGHT: 148 LBS | BODY MASS INDEX: 26.22 KG/M2 | HEART RATE: 89 BPM | HEIGHT: 63 IN | SYSTOLIC BLOOD PRESSURE: 119 MMHG

## 2022-09-06 DIAGNOSIS — Z80.0 FAMILY HISTORY OF MALIGNANT NEOPLASM OF DIGESTIVE ORGANS: ICD-10-CM

## 2022-09-06 DIAGNOSIS — Z87.39 PERSONAL HISTORY OF OTHER DISEASES OF THE MUSCULOSKELETAL SYSTEM AND CONNECTIVE TISSUE: ICD-10-CM

## 2022-09-06 DIAGNOSIS — Z80.3 FAMILY HISTORY OF MALIGNANT NEOPLASM OF BREAST: ICD-10-CM

## 2022-09-06 DIAGNOSIS — F31.9 BIPOLAR DISORDER, UNSPECIFIED: ICD-10-CM

## 2022-09-06 DIAGNOSIS — Z86.69 PERSONAL HISTORY OF OTHER DISEASES OF THE NERVOUS SYSTEM AND SENSE ORGANS: ICD-10-CM

## 2022-09-06 DIAGNOSIS — Z78.9 OTHER SPECIFIED HEALTH STATUS: ICD-10-CM

## 2022-09-06 DIAGNOSIS — Z87.19 PERSONAL HISTORY OF OTHER DISEASES OF THE DIGESTIVE SYSTEM: ICD-10-CM

## 2022-09-06 DIAGNOSIS — Z91.81 HISTORY OF FALLING: ICD-10-CM

## 2022-09-06 DIAGNOSIS — N80.9 ENDOMETRIOSIS, UNSPECIFIED: ICD-10-CM

## 2022-09-06 PROCEDURE — 99203 OFFICE O/P NEW LOW 30 MIN: CPT | Mod: 25

## 2022-09-06 PROCEDURE — 46600 DIAGNOSTIC ANOSCOPY SPX: CPT

## 2022-09-11 PROBLEM — Z87.19 HISTORY OF HIATAL HERNIA: Status: RESOLVED | Noted: 2022-09-11 | Resolved: 2022-09-11

## 2022-09-11 NOTE — PROCEDURE
[FreeTextEntry1] : Anoscopy - performed with small lighted disposable anoscope, normal rectal mucosa, unremarkable internal hemorrhoids

## 2022-09-11 NOTE — REVIEW OF SYSTEMS
[Abdominal Pain] : abdominal pain [Vomiting] : vomiting [Constipation] : constipation [Limb Pain] : limb pain [Negative] : Integumentary [As Noted in HPI] : as noted in HPI [Fever] : no fever [Chills] : no chills [Eye Pain] : no eye pain [Earache] : no earache [Chest Pain] : no chest pain [Shortness Of Breath] : no shortness of breath [FreeTextEntry7] : Bloating [FreeTextEntry8] : Difficulty urinating [FreeTextEntry9] : Neck pain, sees PT [de-identified] : Chronic migraines

## 2022-09-11 NOTE — HISTORY OF PRESENT ILLNESS
[FreeTextEntry1] : Ms. PHYLLIS MERRITT  is a 62 year F with a history of bipolar, chronic constipation, cervical pain, acid reflux, endometriosis, chronic migraines and rectal prolapse here for an initial visit. She originally saw Dr. Sanchez who recommended a abdominal rectal prolapse repair but she was doubtful of that recommendation is here for a second opinion because she wanted to know if it would be safer going through the rectum due to her prior surgeries for endometriosis. She has a history of chronic constipation and is currently taking Linzess and motrgrity twice a week (can't do it more often because it interferes with work then). On the other days she has been relying more on Herbal tea and Mineral oil. She has daily normal to soft BM, with occasional straining. She has been having some issues with bloating as well. She has been Vegan for 5 years and has tried greek yogurt in the morning. She saw pelvic floor therapy last year and has been doing breathing and Kegel exercises since (sounds like maybe she was diagnosed w/ some form of obstructive defecation in the past. She is going to meet with the urogynecologist Dr. Parr. She also has b/l lower abdominal pain along with nausea/vomiting when eating certain foods such as fried chickpeas and beans. She had 2 recent falls. She tested weakly positive for CASIMIRO and was recommended hydroxychloroquine but she isn't taking it due to concerns with the medication. She is not on a fiber supplement. Last colonoscopy (2019) revealed diverticulosis and 1 polyp. Denies incontinence, fevers and chills.

## 2022-09-11 NOTE — ASSESSMENT
[FreeTextEntry1] : Ms. PHYLLIS MERRITT  is a 62 year F w/ a history of chronic constipation, rectal prolapse and possibly pelvic floor dysfunction.

## 2022-09-11 NOTE — PHYSICAL EXAM
[Normal Breath Sounds] : Normal breath sounds [Normal Heart Sounds] : normal heart sounds [Normal Rate and Rhythm] : normal rate and rhythm [Alert] : alert [Oriented to Person] : oriented to person [Oriented to Place] : oriented to place [Oriented to Time] : oriented to time [Lax] : was lax [None] : no [Excoriation] : no perianal excoriation [JVD] : no jugular venous distention  [Wheezing] : no wheezing was heard [de-identified] : soft, non-distended, non-tender to palpation, well healed laparoscopic sites [de-identified] : Full thickness rectal prolapse of a few cm w/ valsalva which reduces spontaneously. Mildly lax resting sphincter tone but good squeeze and pushing. Internal exam below [de-identified] : awake, alert, NAD  [de-identified] : normocephalic, atraumatic, EOMI, normal conjunctiva  [de-identified] : b/l chest rise, EWOB on RA  [de-identified] : deferred [de-identified] : Normal strength  [de-identified] : Perianal as above  [de-identified] : normal mood and affect

## 2022-09-14 NOTE — ED ADULT TRIAGE NOTE - NS ED TRIAGE AVPU SCALE
Alert-The patient is alert, awake and responds to voice. The patient is oriented to time, place, and person. The triage nurse is able to obtain subjective information. None known

## 2022-09-19 ENCOUNTER — APPOINTMENT (OUTPATIENT)
Dept: ENDOCRINOLOGY | Facility: CLINIC | Age: 62
End: 2022-09-19

## 2022-09-23 ENCOUNTER — NON-APPOINTMENT (OUTPATIENT)
Age: 62
End: 2022-09-23

## 2022-09-23 ENCOUNTER — APPOINTMENT (OUTPATIENT)
Dept: OPHTHALMOLOGY | Facility: CLINIC | Age: 62
End: 2022-09-23

## 2022-09-23 PROCEDURE — 68020 INCISE/DRAIN EYELID LINING: CPT | Mod: LT

## 2022-09-23 PROCEDURE — 92004 COMPRE OPH EXAM NEW PT 1/>: CPT | Mod: 25

## 2022-09-29 NOTE — REASON FOR VISIT
[Initial Visit ___] : an initial visit for [unfilled] [Questionnaire Received] : Patient questionnaire received [Intake Form Reviewed] : Patient intake form with past medical history, surgical history, family history and social history reviewed today [Cannot Empty Bladder] : cannot empty bladder [Pelvic Organ Prolapse] : pelvic organ prolapse [Pelvic Strengthening] : pelvic strengthening

## 2022-09-30 ENCOUNTER — APPOINTMENT (OUTPATIENT)
Dept: UROGYNECOLOGY | Facility: CLINIC | Age: 62
End: 2022-09-30

## 2022-09-30 VITALS
HEART RATE: 76 BPM | OXYGEN SATURATION: 99 % | WEIGHT: 150 LBS | TEMPERATURE: 97.9 F | DIASTOLIC BLOOD PRESSURE: 63 MMHG | BODY MASS INDEX: 26.58 KG/M2 | HEIGHT: 63 IN | SYSTOLIC BLOOD PRESSURE: 96 MMHG

## 2022-09-30 DIAGNOSIS — Z87.891 PERSONAL HISTORY OF NICOTINE DEPENDENCE: ICD-10-CM

## 2022-09-30 DIAGNOSIS — R39.11 HESITANCY OF MICTURITION: ICD-10-CM

## 2022-09-30 LAB
BILIRUB UR QL STRIP: NEGATIVE
COLLECTION METHOD: NORMAL
GLUCOSE UR-MCNC: NEGATIVE
HCG UR QL: 0.2 EU/DL
HGB UR QL STRIP.AUTO: NEGATIVE
KETONES UR-MCNC: NEGATIVE
LEUKOCYTE ESTERASE UR QL STRIP: NEGATIVE
NITRITE UR QL STRIP: NEGATIVE
PH UR STRIP: 5.5
PROT UR STRIP-MCNC: NEGATIVE
SP GR UR STRIP: 1.01

## 2022-09-30 PROCEDURE — 51701 INSERT BLADDER CATHETER: CPT

## 2022-09-30 PROCEDURE — 99215 OFFICE O/P EST HI 40 MIN: CPT | Mod: 25

## 2022-09-30 PROCEDURE — 81003 URINALYSIS AUTO W/O SCOPE: CPT | Mod: QW

## 2022-09-30 PROCEDURE — 99406 BEHAV CHNG SMOKING 3-10 MIN: CPT

## 2022-10-11 NOTE — HISTORY OF PRESENT ILLNESS
[Cystocele (Obstetric)] : no [Uterine Prolapse] : no [Vaginal Wall Prolapse] : no [Rectal Prolapse] : no [Unable To Restrain Bowel Movement] : no [Urinary Frequency] : no [Feelings Of Urinary Urgency] : no [Urinary Tract Infection] : severe [Pain During Urination (Dysuria)] : severe [Constipation Obstructed Defecation] : severe [Stool Visible Blood] : no [Incomplete Emptying Of Stool] : no [Pelvic Pain] : no [Vaginal Pain] : no [Rectal Pain] : no [] : mild [de-identified] : 5-6x/d [de-identified] : Sometimes  [de-identified] : Almost every day [de-identified] : 3-4x/night  [de-identified] : Most of the time, will crede and lean forward   [FreeTextEntry6] : Chronic constipation on Linzess/Motegrity, managed by multiple doctors,  [de-identified] : Only when pushing  [de-identified] : Sometimes [de-identified] : Sometimes [FreeTextEntry1] : \eloy Roy is a 61yo who presents with obstructive urinary symptoms and for evaluation for prolapse. She has full thickness rectal prolapse, for which she has seen Cooper Kunz and Joanne and is considering surgical correction. MR defecography showed mild anterior, apical, and posterior descent in addition to moderate rectal descent, enterocele, and peritoneocele. She does not have any pelvic pressure or bulge symptoms. \par \par She also has a history of chronic constipation and has been seeing an acupuncturist for her GI issues in addition to taking medications, mineral oil, and changing her diet. \par \par For her urinary hesitancy and incomplete emptying, she had seen Dr Hayes in 2020/2021 and underwent UDS and cystoscopy, which were both unremarkable. She had seen a pelvic floor PT for 1.5 months about 1 year ago, where she learned pelvic floor relaxation techniques that have helped with her symptoms. \par \par PMH: chronic constipation, GERD, osteoarthitis, rectal prolapse, migraines, endometriosis\par PSH: multiple orthopedic surgeries, cervical spine surgery (C4-5), 2 laparoscopies for endometriosis (2000)\par Soc: current <1/2 ppd smoker >40y, also vapes, social alcohol, single \par \par Daily fluid intake: 8 glasses water + 1c coffee + 2c herbal tea. 1 glass of lemonade or iced tea 2x/week

## 2022-10-11 NOTE — DISCUSSION/SUMMARY
[Reviewed Radiology Report(s)] : Radiology reports were reviewed. [FreeTextEntry1] : \par I reviewed her symptoms, radiology report, and physical exam with Manuela. We discussed that MRI is not a good modality to evaluate prolapse and that I did not appreciate any pelvic organ prolapse today. We reviewed prolapse symptoms and if she develops those, we could discuss further management. \par \par With regards to her urinary symptoms, we discussed that they are likely related to her pelvic floor dysfunction. We reviewed pelvic floor relaxation techniques and conservative therapies such as warm sitz baths. I offered her another referral for physical therapy, which she declined. We also talked about how chronic constipation and fecal impaction can affect the bladder and urinary function and that getting her constipation under better control may improve her urinary symptoms. Lastly, we discussed how smoking also impacts urinary symptoms in addition to all of its other negative effects. We discussed quitting; however, she is not ready to do so or try at this time. \par \par RTO as needed.

## 2022-10-11 NOTE — LETTER BODY
[Dear  ___] : Dear  [unfilled], [I had the pleasure of evaluating your patient, [unfilled]. Thank you for referring Ms. [unfilled] for consultation for ___] : I had the pleasure of evaluating your patient, [unfilled]. Thank you for referring Ms. [unfilled] for consultation for [unfilled]. [Attached please find my note.] : Attached please find my note. [Thank you very much for allowing me to participate in the care of this patient. If you have any questions, please do not hesitate to contact me] : Thank you very much for allowing me to participate in the care of this patient. If you have any questions, please do not hesitate to contact me. [Dear  ___] : Dear ~JOANNE, [FreeTextEntry1] : incomplete bladder emptying

## 2022-10-18 ENCOUNTER — APPOINTMENT (OUTPATIENT)
Dept: SURGERY | Facility: CLINIC | Age: 62
End: 2022-10-18

## 2022-10-18 VITALS
DIASTOLIC BLOOD PRESSURE: 82 MMHG | WEIGHT: 150 LBS | HEART RATE: 75 BPM | BODY MASS INDEX: 26.58 KG/M2 | OXYGEN SATURATION: 97 % | HEIGHT: 63 IN | SYSTOLIC BLOOD PRESSURE: 118 MMHG

## 2022-10-18 VITALS — TEMPERATURE: 95.8 F

## 2022-10-18 PROCEDURE — 99214 OFFICE O/P EST MOD 30 MIN: CPT

## 2022-10-18 RX ORDER — METRONIDAZOLE 250 MG/1
250 TABLET ORAL
Qty: 3 | Refills: 0 | Status: ACTIVE | COMMUNITY
Start: 2022-10-18 | End: 1900-01-01

## 2022-10-18 RX ORDER — NEOMYCIN SULFATE 500 MG/1
500 TABLET ORAL
Qty: 3 | Refills: 0 | Status: ACTIVE | COMMUNITY
Start: 2022-10-18 | End: 1900-01-01

## 2022-10-18 NOTE — ASSESSMENT
[FreeTextEntry1] : Ms. PHYLLIS MERRTIT  is a 62 year F w/ a history of chronic constipation, rectal prolapse and pelvic floor dysfunction (weakness) here for a follow up visit.

## 2022-10-18 NOTE — REVIEW OF SYSTEMS
[Abdominal Pain] : abdominal pain [Vomiting] : vomiting [Constipation] : constipation [Limb Pain] : limb pain [As Noted in HPI] : as noted in HPI [Negative] : Integumentary [Fever] : no fever [Chills] : no chills [Eye Pain] : no eye pain [Earache] : no earache [Chest Pain] : no chest pain [Shortness Of Breath] : no shortness of breath [FreeTextEntry7] : Bloating [FreeTextEntry8] : Difficulty urinating [FreeTextEntry9] : Neck pain, sees PT [de-identified] : Chronic migraines

## 2022-10-18 NOTE — HISTORY OF PRESENT ILLNESS
[FreeTextEntry1] : Ms. PHYLLIS MERRITT  is a 62 year F with a history of bipolar, chronic constipation, cervical pain, acid reflux, endometriosis, chronic migraines and rectal prolapse here for a follow up visit. She originally saw Dr. Sanchez who recommended a abdominal rectal prolapse repair but she was doubtful of that recommendation is here for a second opinion because she wanted to know if it would be safer going through the rectum due to her prior surgeries for endometriosis. She has a history of chronic constipation and is currently taking Linzess and motrgrity twice a week (can't do it more often because it interferes with work then). On the other days she has been relying more on Herbal tea and Mineral oil. She has daily normal to soft BM, with occasional straining. She has been having some issues with bloating as well. She has been Vegan for 5 years and has tried greek yogurt in the morning. She saw pelvic floor therapy last year and has been doing breathing and Kegel exercises since (sounds like maybe she was diagnosed w/ some form of obstructive defecation in the past. Last colonoscopy (2019) revealed diverticulosis and 1 polyp. Denies incontinence, fevers, chills or needing to manually assist w/ fecal evacuation. Since her last appointment she met with with the urogynecologist Dr. Parr who didn't note any vaginal prolapse. She has also undergone the MRI defecography on 9/19/2022 which revealed a small rectocele, rectal prolapse, mild vaginal prolapse and pelvic floor weakness. Today patient is here to review MRI defecography results. She is still having constipation, bloating which leads to vomiting. She also still has to strain to urinate. She is waking up 3-4 times at night with the urge to urinate. She has a follow up appointment with Dr. Granda and will discuss the reflux issues with him. Last EGD in 2021 revealed a hiatal hernia, otherwise normal.

## 2022-10-18 NOTE — PHYSICAL EXAM
[Normal Breath Sounds] : Normal breath sounds [Normal Heart Sounds] : normal heart sounds [Normal Rate and Rhythm] : normal rate and rhythm [Alert] : alert [Oriented to Person] : oriented to person [Oriented to Place] : oriented to place [Oriented to Time] : oriented to time [Exam Deferred] : exam was deferred [JVD] : no jugular venous distention  [Wheezing] : no wheezing was heard [de-identified] : soft, non-distended, non-tender to palpation, well healed laparoscopic sites [de-identified] : awake, alert, NAD  [de-identified] : normocephalic, atraumatic, EOMI, normal conjunctiva  [de-identified] : b/l chest rise, EWOB on RA  [de-identified] : deferred [de-identified] : Normal strength  [de-identified] : abd as above [de-identified] : normal mood and affect

## 2022-10-19 NOTE — ED PROVIDER NOTE - QUALITY
Faustino Pratt PUSHPA [phone],[facility] a[facility]

## 2022-10-21 ENCOUNTER — NON-APPOINTMENT (OUTPATIENT)
Age: 62
End: 2022-10-21

## 2022-10-24 RX ORDER — ESTRADIOL 10 UG/1
10 TABLET, FILM COATED VAGINAL
Qty: 36 | Refills: 0 | Status: ACTIVE | COMMUNITY
Start: 2022-08-16

## 2022-10-24 RX ORDER — FAMOTIDINE 40 MG/5ML
40 POWDER, FOR SUSPENSION ORAL
Qty: 150 | Refills: 0 | Status: ACTIVE | COMMUNITY
Start: 2022-09-16

## 2022-10-24 RX ORDER — METHYLPREDNISOLONE 4 MG/1
4 TABLET ORAL
Qty: 21 | Refills: 0 | Status: ACTIVE | COMMUNITY
Start: 2022-07-12

## 2022-10-24 RX ORDER — TOPIRAMATE 25 MG/1
25 TABLET, FILM COATED ORAL
Qty: 240 | Refills: 0 | Status: ACTIVE | COMMUNITY
Start: 2022-05-20

## 2022-10-24 RX ORDER — DIHYDROERGOTAMINE MESYLATE NASAL 4 MG/ML
4 SPRAY, METERED NASAL
Qty: 8 | Refills: 0 | Status: ACTIVE | COMMUNITY
Start: 2022-05-22

## 2022-10-24 RX ORDER — PANCRELIPASE 120000; 24000; 76000 [USP'U]/1; [USP'U]/1; [USP'U]/1
24000-76000 CAPSULE, DELAYED RELEASE PELLETS ORAL
Qty: 90 | Refills: 0 | Status: ACTIVE | COMMUNITY
Start: 2022-08-25

## 2022-10-24 RX ORDER — ZOLPIDEM TARTRATE 5 MG/1
5 TABLET ORAL
Qty: 30 | Refills: 0 | Status: ACTIVE | COMMUNITY
Start: 2022-05-26

## 2022-10-24 RX ORDER — ROSUVASTATIN CALCIUM 20 MG/1
20 TABLET, FILM COATED ORAL
Qty: 90 | Refills: 0 | Status: ACTIVE | COMMUNITY
Start: 2022-08-26

## 2022-10-24 RX ORDER — OLOPATADINE HCL 1 MG/ML
0.1 SOLUTION/ DROPS OPHTHALMIC
Qty: 5 | Refills: 0 | Status: ACTIVE | COMMUNITY
Start: 2022-08-08

## 2022-10-24 RX ORDER — GABAPENTIN 300 MG/1
300 CAPSULE ORAL
Qty: 60 | Refills: 0 | Status: ACTIVE | COMMUNITY
Start: 2022-07-12

## 2022-10-25 ENCOUNTER — APPOINTMENT (OUTPATIENT)
Dept: GASTROENTEROLOGY | Facility: CLINIC | Age: 62
End: 2022-10-25

## 2022-10-25 VITALS
OXYGEN SATURATION: 97 % | BODY MASS INDEX: 26.58 KG/M2 | TEMPERATURE: 97 F | SYSTOLIC BLOOD PRESSURE: 103 MMHG | WEIGHT: 150 LBS | HEART RATE: 96 BPM | HEIGHT: 63 IN | DIASTOLIC BLOOD PRESSURE: 70 MMHG

## 2022-10-25 PROCEDURE — 99213 OFFICE O/P EST LOW 20 MIN: CPT

## 2022-10-25 NOTE — PHYSICAL EXAM

## 2022-10-31 LAB — H PYLORI AG STL QL: NEGATIVE

## 2022-11-01 ENCOUNTER — RESULT REVIEW (OUTPATIENT)
Age: 62
End: 2022-11-01

## 2022-11-01 ENCOUNTER — OUTPATIENT (OUTPATIENT)
Dept: OUTPATIENT SERVICES | Facility: HOSPITAL | Age: 62
LOS: 1 days | End: 2022-11-01
Payer: COMMERCIAL

## 2022-11-01 VITALS — RESPIRATION RATE: 18 BRPM | TEMPERATURE: 99 F | HEIGHT: 63 IN | WEIGHT: 154.98 LBS

## 2022-11-01 DIAGNOSIS — Z01.818 ENCOUNTER FOR OTHER PREPROCEDURAL EXAMINATION: ICD-10-CM

## 2022-11-01 DIAGNOSIS — Z98.890 OTHER SPECIFIED POSTPROCEDURAL STATES: Chronic | ICD-10-CM

## 2022-11-01 DIAGNOSIS — K62.3 RECTAL PROLAPSE: ICD-10-CM

## 2022-11-01 DIAGNOSIS — F41.9 ANXIETY DISORDER, UNSPECIFIED: ICD-10-CM

## 2022-11-01 DIAGNOSIS — Z98.89 OTHER SPECIFIED POSTPROCEDURAL STATES: Chronic | ICD-10-CM

## 2022-11-01 DIAGNOSIS — K21.9 GASTRO-ESOPHAGEAL REFLUX DISEASE WITHOUT ESOPHAGITIS: ICD-10-CM

## 2022-11-01 DIAGNOSIS — E78.5 HYPERLIPIDEMIA, UNSPECIFIED: ICD-10-CM

## 2022-11-01 LAB
ALBUMIN SERPL ELPH-MCNC: 3.5 G/DL — SIGNIFICANT CHANGE UP (ref 3.5–5)
ALP SERPL-CCNC: 87 U/L — SIGNIFICANT CHANGE UP (ref 40–120)
ALT FLD-CCNC: 22 U/L DA — SIGNIFICANT CHANGE UP (ref 10–60)
ANION GAP SERPL CALC-SCNC: 4 MMOL/L — LOW (ref 5–17)
APTT BLD: 29 SEC — SIGNIFICANT CHANGE UP (ref 27.5–35.5)
AST SERPL-CCNC: 11 U/L — SIGNIFICANT CHANGE UP (ref 10–40)
BILIRUB SERPL-MCNC: 0.3 MG/DL — SIGNIFICANT CHANGE UP (ref 0.2–1.2)
BLD GP AB SCN SERPL QL: SIGNIFICANT CHANGE UP
BUN SERPL-MCNC: 14 MG/DL — SIGNIFICANT CHANGE UP (ref 7–18)
CALCIUM SERPL-MCNC: 9.2 MG/DL — SIGNIFICANT CHANGE UP (ref 8.4–10.5)
CHLORIDE SERPL-SCNC: 108 MMOL/L — SIGNIFICANT CHANGE UP (ref 96–108)
CO2 SERPL-SCNC: 28 MMOL/L — SIGNIFICANT CHANGE UP (ref 22–31)
CREAT SERPL-MCNC: 0.84 MG/DL — SIGNIFICANT CHANGE UP (ref 0.5–1.3)
EGFR: 80 ML/MIN/1.73M2 — SIGNIFICANT CHANGE UP
GLUCOSE SERPL-MCNC: 98 MG/DL — SIGNIFICANT CHANGE UP (ref 70–99)
HCT VFR BLD CALC: 40.3 % — SIGNIFICANT CHANGE UP (ref 34.5–45)
HGB BLD-MCNC: 12.7 G/DL — SIGNIFICANT CHANGE UP (ref 11.5–15.5)
INR BLD: 0.96 RATIO — SIGNIFICANT CHANGE UP (ref 0.88–1.16)
MCHC RBC-ENTMCNC: 28 PG — SIGNIFICANT CHANGE UP (ref 27–34)
MCHC RBC-ENTMCNC: 31.5 GM/DL — LOW (ref 32–36)
MCV RBC AUTO: 89 FL — SIGNIFICANT CHANGE UP (ref 80–100)
NRBC # BLD: 0 /100 WBCS — SIGNIFICANT CHANGE UP (ref 0–0)
PLATELET # BLD AUTO: 273 K/UL — SIGNIFICANT CHANGE UP (ref 150–400)
POTASSIUM SERPL-MCNC: 4 MMOL/L — SIGNIFICANT CHANGE UP (ref 3.5–5.3)
POTASSIUM SERPL-SCNC: 4 MMOL/L — SIGNIFICANT CHANGE UP (ref 3.5–5.3)
PROT SERPL-MCNC: 6.6 G/DL — SIGNIFICANT CHANGE UP (ref 6–8.3)
PROTHROM AB SERPL-ACNC: 11.4 SEC — SIGNIFICANT CHANGE UP (ref 10.5–13.4)
RBC # BLD: 4.53 M/UL — SIGNIFICANT CHANGE UP (ref 3.8–5.2)
RBC # FLD: 13.6 % — SIGNIFICANT CHANGE UP (ref 10.3–14.5)
SODIUM SERPL-SCNC: 140 MMOL/L — SIGNIFICANT CHANGE UP (ref 135–145)
WBC # BLD: 14.89 K/UL — HIGH (ref 3.8–10.5)
WBC # FLD AUTO: 14.89 K/UL — HIGH (ref 3.8–10.5)

## 2022-11-01 PROCEDURE — 71046 X-RAY EXAM CHEST 2 VIEWS: CPT

## 2022-11-01 PROCEDURE — 71046 X-RAY EXAM CHEST 2 VIEWS: CPT | Mod: 26

## 2022-11-01 PROCEDURE — G0463: CPT

## 2022-11-01 PROCEDURE — 83036 HEMOGLOBIN GLYCOSYLATED A1C: CPT

## 2022-11-01 RX ORDER — SIMVASTATIN 20 MG/1
0 TABLET, FILM COATED ORAL
Qty: 0 | Refills: 0 | DISCHARGE

## 2022-11-01 NOTE — H&P PST ADULT - VENOUS THROMBOEMBOLISM CURRENT STATUS
Addended by: JOHN OLMEDO on: 1/15/2020 01:55 PM     Modules accepted: Orders     (1) other risk factor (includes escalating BMI, pack-years of smoking, diabetes requiring insulin, chemotherapy, female gender and length of surgery)

## 2022-11-01 NOTE — H&P PST ADULT - NSANTHOSAYNRD_GEN_A_CORE
No. CORNEL screening performed.  STOP BANG Legend: 0-2 = LOW Risk; 3-4 = INTERMEDIATE Risk; 5-8 = HIGH Risk

## 2022-11-01 NOTE — ASSESSMENT
[FreeTextEntry1] : This is a 62 year old female here for a follow up of worsening reflux and bloating.\par \par My plan\par Increase PPI to 40mg and take it 45 min before dinner\par H-pylori stool antigen

## 2022-11-01 NOTE — H&P PST ADULT - PROBLEM SELECTOR PLAN 1
Scheduled for robotic assisted laparoscopic sigmoid resection suture rectopexy and flexible sigmoidoscopy 11/16/22  Preoperative instructions discussed and given to patient.   Instructed to schedule COVID 19 test 3 days prior to surgery.   Discussed preprocedure skin preparation using  chlorhexidine gluconate 4% solution three days prior to  surgery.  Instructed patient to avoid aspirin and aspirin products, over the counter medications such as vitamins and herbal medications, one week prior to surgery.  Take Tylenol as needed for pain  Patient verbalized understanding of instructions  Follow instructions from surgeon's office  Labs/CXR - here today  EKG and medical optimization from PCP - pending in PST

## 2022-11-01 NOTE — H&P PST ADULT - NSICDXPASTMEDICALHX_GEN_ALL_CORE_FT
PAST MEDICAL HISTORY:  Anxiety     Bipolar 1 disorder     Constipation     Depression     Diverticulosis     GERD (gastroesophageal reflux disease)     History of migraine headaches

## 2022-11-01 NOTE — H&P PST ADULT - PROBLEM SELECTOR PLAN 3
Continue clonopin (as needed) and lexapro   Take medications with a sip of water the morning of surgery

## 2022-11-01 NOTE — H&P PST ADULT - ASSESSMENT
63 y/o female with h/o HLD, migraine headaches, anxiety and depression, diverticulitis, GERD is diagnosed with rectal prolapse   STOP BANG Score is 1

## 2022-11-01 NOTE — H&P PST ADULT - NS ATTEND AMEND GEN_ALL_CORE FT
OR today for robotic sigmoid resection, flex sig, rectopexy  Lithotomy w/ both arms tucked  general and spinal duramorph  0.5% marcaine and exparel 1:1    Magui Rubio MD  Attending physician

## 2022-11-01 NOTE — H&P PST ADULT - RESPIRATORY
clear to auscultation bilaterally/no wheezes/no rales/no rhonchi/no respiratory distress/no use of accessory muscles/no subcutaneous emphysema/airway patent/breath sounds equal/good air movement/respirations non-labored/no intercostal retractions/no dull ness or hyperresonance to percussion

## 2022-11-01 NOTE — H&P PST ADULT - MS GEN HX ROS MEA POS PC
multiple joints - right and right hip/arthritis/joint swelling/joint pain/myalgia/stiffness/neck pain

## 2022-11-01 NOTE — HISTORY OF PRESENT ILLNESS
[FreeTextEntry1] : This is a 62 year old female here for a follow up of worsening reflux and bloating. She is on PPI 20mg daily. Her symptoms worse in the evening. She eats dinner and sleeps 3 hours after. Denies any late night melas. Denies any difficulty swallowing or N&V. No blood or dark tarry stools. Of note she is following Dr. Rubio. Planning for a robotic sigmoid resection with suture rectopexy at [facility] on 11/16/2022.

## 2022-11-01 NOTE — H&P PST ADULT - MUSCULOSKELETAL
details… ROM intact/no joint swelling/no joint erythema/no joint warmth/no calf tenderness/normal gait/strength 5/5 bilateral upper extremities/strength 5/5 bilateral lower extremities/no chest wall tenderness

## 2022-11-01 NOTE — H&P PST ADULT - NSICDXPASTSURGICALHX_GEN_ALL_CORE_FT
PAST SURGICAL HISTORY:  H/O arthroscopy right knee 2011 and 2016    H/O arthroscopy right hip 2017    H/O carpal tunnel repair     H/O carpal tunnel repair 2021 with right thumb trigger finger repair    H/O cervical spine surgery C4- C5 (2020)    History of carpal tunnel repair left hand 11/2020 with trigger finger repair    S/P exploratory laparotomy for endometriosis x 2

## 2022-11-01 NOTE — H&P PST ADULT - HISTORY OF PRESENT ILLNESS
61 y/o female with h/o HLD, migraine headaches, anxiety and depression, diverticulitis, GERD complains of abdominal bloating, dyspepsia and chronic constipation since 2018. She is diagnosed with rectal prolapse and is scheduled for robotic assisted laparoscopic sigmoid resection suture rectopexy and flexible sigmoidoscopy 11/16/22

## 2022-11-02 LAB
A1C WITH ESTIMATED AVERAGE GLUCOSE RESULT: 5.8 % — HIGH (ref 4–5.6)
ESTIMATED AVERAGE GLUCOSE: 120 MG/DL — HIGH (ref 68–114)

## 2022-11-14 LAB — SARS-COV-2 N GENE NPH QL NAA+PROBE: NOT DETECTED

## 2022-11-15 ENCOUNTER — TRANSCRIPTION ENCOUNTER (OUTPATIENT)
Age: 62
End: 2022-11-15

## 2022-11-16 ENCOUNTER — APPOINTMENT (OUTPATIENT)
Dept: SURGERY | Facility: HOSPITAL | Age: 62
End: 2022-11-16
Payer: MEDICARE

## 2022-11-16 ENCOUNTER — INPATIENT (INPATIENT)
Facility: HOSPITAL | Age: 62
LOS: 2 days | Discharge: HOME | DRG: 331 | End: 2022-11-19
Attending: STUDENT IN AN ORGANIZED HEALTH CARE EDUCATION/TRAINING PROGRAM | Admitting: STUDENT IN AN ORGANIZED HEALTH CARE EDUCATION/TRAINING PROGRAM
Payer: COMMERCIAL

## 2022-11-16 ENCOUNTER — TRANSCRIPTION ENCOUNTER (OUTPATIENT)
Age: 62
End: 2022-11-16

## 2022-11-16 ENCOUNTER — RESULT REVIEW (OUTPATIENT)
Age: 62
End: 2022-11-16

## 2022-11-16 VITALS
RESPIRATION RATE: 16 BRPM | DIASTOLIC BLOOD PRESSURE: 75 MMHG | WEIGHT: 154.98 LBS | HEIGHT: 63 IN | HEART RATE: 99 BPM | TEMPERATURE: 97 F | SYSTOLIC BLOOD PRESSURE: 104 MMHG | OXYGEN SATURATION: 95 %

## 2022-11-16 DIAGNOSIS — Z98.89 OTHER SPECIFIED POSTPROCEDURAL STATES: Chronic | ICD-10-CM

## 2022-11-16 DIAGNOSIS — R10.9 UNSPECIFIED ABDOMINAL PAIN: ICD-10-CM

## 2022-11-16 DIAGNOSIS — Z98.890 OTHER SPECIFIED POSTPROCEDURAL STATES: Chronic | ICD-10-CM

## 2022-11-16 DIAGNOSIS — Z90.49 ACQUIRED ABSENCE OF OTHER SPECIFIED PARTS OF DIGESTIVE TRACT: ICD-10-CM

## 2022-11-16 DIAGNOSIS — K62.3 RECTAL PROLAPSE: ICD-10-CM

## 2022-11-16 DIAGNOSIS — F31.9 BIPOLAR DISORDER, UNSPECIFIED: ICD-10-CM

## 2022-11-16 LAB — BLD GP AB SCN SERPL QL: SIGNIFICANT CHANGE UP

## 2022-11-16 PROCEDURE — S2900 ROBOTIC SURGICAL SYSTEM: CPT | Mod: NC

## 2022-11-16 PROCEDURE — 45330 DIAGNOSTIC SIGMOIDOSCOPY: CPT

## 2022-11-16 PROCEDURE — 88304 TISSUE EXAM BY PATHOLOGIST: CPT | Mod: 26

## 2022-11-16 PROCEDURE — 88307 TISSUE EXAM BY PATHOLOGIST: CPT | Mod: 26

## 2022-11-16 PROCEDURE — 45550 REPAIR RECTUM/REMOVE SIGMOID: CPT

## 2022-11-16 PROCEDURE — 99222 1ST HOSP IP/OBS MODERATE 55: CPT

## 2022-11-16 DEVICE — STAPLER ECHELON CIRCULAR POWERED 29MM: Type: IMPLANTABLE DEVICE | Status: FUNCTIONAL

## 2022-11-16 DEVICE — TISSEEL 4ML: Type: IMPLANTABLE DEVICE | Status: FUNCTIONAL

## 2022-11-16 RX ORDER — ACETAMINOPHEN 500 MG
1000 TABLET ORAL EVERY 6 HOURS
Refills: 0 | Status: COMPLETED | OUTPATIENT
Start: 2022-11-16 | End: 2022-11-17

## 2022-11-16 RX ORDER — UBROGEPANT 100 MG/1
1 TABLET ORAL
Qty: 0 | Refills: 0 | DISCHARGE

## 2022-11-16 RX ORDER — DEXTROSE MONOHYDRATE, SODIUM CHLORIDE, AND POTASSIUM CHLORIDE 50; .745; 4.5 G/1000ML; G/1000ML; G/1000ML
1000 INJECTION, SOLUTION INTRAVENOUS
Refills: 0 | Status: DISCONTINUED | OUTPATIENT
Start: 2022-11-16 | End: 2022-11-18

## 2022-11-16 RX ORDER — ONDANSETRON 8 MG/1
4 TABLET, FILM COATED ORAL ONCE
Refills: 0 | Status: DISCONTINUED | OUTPATIENT
Start: 2022-11-16 | End: 2022-11-16

## 2022-11-16 RX ORDER — MORPHINE SULFATE 50 MG/1
0.3 CAPSULE, EXTENDED RELEASE ORAL ONCE
Refills: 0 | Status: DISCONTINUED | OUTPATIENT
Start: 2022-11-16 | End: 2022-11-19

## 2022-11-16 RX ORDER — OXYCODONE HYDROCHLORIDE 5 MG/1
5 TABLET ORAL EVERY 4 HOURS
Refills: 0 | Status: DISCONTINUED | OUTPATIENT
Start: 2022-11-16 | End: 2022-11-19

## 2022-11-16 RX ORDER — PANTOPRAZOLE SODIUM 20 MG/1
1 TABLET, DELAYED RELEASE ORAL
Qty: 0 | Refills: 0 | DISCHARGE

## 2022-11-16 RX ORDER — SODIUM CHLORIDE 9 MG/ML
3 INJECTION INTRAMUSCULAR; INTRAVENOUS; SUBCUTANEOUS EVERY 8 HOURS
Refills: 0 | Status: DISCONTINUED | OUTPATIENT
Start: 2022-11-16 | End: 2022-11-16

## 2022-11-16 RX ORDER — FENTANYL CITRATE 50 UG/ML
25 INJECTION INTRAVENOUS
Refills: 0 | Status: DISCONTINUED | OUTPATIENT
Start: 2022-11-16 | End: 2022-11-16

## 2022-11-16 RX ORDER — TRAZODONE HCL 50 MG
50 TABLET ORAL AT BEDTIME
Refills: 0 | Status: DISCONTINUED | OUTPATIENT
Start: 2022-11-16 | End: 2022-11-19

## 2022-11-16 RX ORDER — ESCITALOPRAM OXALATE 10 MG/1
2 TABLET, FILM COATED ORAL
Qty: 0 | Refills: 0 | DISCHARGE

## 2022-11-16 RX ORDER — CLONAZEPAM 1 MG
1 TABLET ORAL
Qty: 0 | Refills: 0 | DISCHARGE

## 2022-11-16 RX ORDER — CHLORHEXIDINE GLUCONATE 213 G/1000ML
1 SOLUTION TOPICAL ONCE
Refills: 0 | Status: COMPLETED | OUTPATIENT
Start: 2022-11-16 | End: 2022-11-16

## 2022-11-16 RX ORDER — HEPARIN SODIUM 5000 [USP'U]/ML
5000 INJECTION INTRAVENOUS; SUBCUTANEOUS EVERY 8 HOURS
Refills: 0 | Status: DISCONTINUED | OUTPATIENT
Start: 2022-11-16 | End: 2022-11-19

## 2022-11-16 RX ORDER — LINACLOTIDE 145 UG/1
1 CAPSULE, GELATIN COATED ORAL
Qty: 0 | Refills: 0 | DISCHARGE

## 2022-11-16 RX ORDER — PYRIDOXINE HCL (VITAMIN B6) 100 MG
1 TABLET ORAL
Qty: 0 | Refills: 0 | DISCHARGE

## 2022-11-16 RX ORDER — ROSUVASTATIN CALCIUM 5 MG/1
1 TABLET ORAL
Qty: 0 | Refills: 0 | DISCHARGE

## 2022-11-16 RX ORDER — ONDANSETRON 8 MG/1
4 TABLET, FILM COATED ORAL EVERY 6 HOURS
Refills: 0 | Status: DISCONTINUED | OUTPATIENT
Start: 2022-11-16 | End: 2022-11-19

## 2022-11-16 RX ORDER — KETOROLAC TROMETHAMINE 30 MG/ML
15 SYRINGE (ML) INJECTION EVERY 6 HOURS
Refills: 0 | Status: DISCONTINUED | OUTPATIENT
Start: 2022-11-16 | End: 2022-11-17

## 2022-11-16 RX ORDER — NALOXONE HYDROCHLORIDE 4 MG/.1ML
0.1 SPRAY NASAL
Refills: 0 | Status: DISCONTINUED | OUTPATIENT
Start: 2022-11-16 | End: 2022-11-19

## 2022-11-16 RX ORDER — PREGABALIN 225 MG/1
1000 CAPSULE ORAL DAILY
Refills: 0 | Status: DISCONTINUED | OUTPATIENT
Start: 2022-11-16 | End: 2022-11-19

## 2022-11-16 RX ORDER — CHOLECALCIFEROL (VITAMIN D3) 125 MCG
1 CAPSULE ORAL
Qty: 0 | Refills: 0 | DISCHARGE

## 2022-11-16 RX ORDER — ATORVASTATIN CALCIUM 80 MG/1
40 TABLET, FILM COATED ORAL AT BEDTIME
Refills: 0 | Status: DISCONTINUED | OUTPATIENT
Start: 2022-11-16 | End: 2022-11-19

## 2022-11-16 RX ORDER — PYRIDOXINE HCL (VITAMIN B6) 100 MG
100 TABLET ORAL DAILY
Refills: 0 | Status: DISCONTINUED | OUTPATIENT
Start: 2022-11-16 | End: 2022-11-19

## 2022-11-16 RX ORDER — SODIUM CHLORIDE 9 MG/ML
500 INJECTION, SOLUTION INTRAVENOUS ONCE
Refills: 0 | Status: COMPLETED | OUTPATIENT
Start: 2022-11-16 | End: 2022-11-16

## 2022-11-16 RX ORDER — CLONAZEPAM 1 MG
0.5 TABLET ORAL THREE TIMES A DAY
Refills: 0 | Status: DISCONTINUED | OUTPATIENT
Start: 2022-11-16 | End: 2022-11-19

## 2022-11-16 RX ORDER — ACETAMINOPHEN 500 MG
1000 TABLET ORAL EVERY 6 HOURS
Refills: 0 | Status: DISCONTINUED | OUTPATIENT
Start: 2022-11-16 | End: 2022-11-16

## 2022-11-16 RX ORDER — PREGABALIN 225 MG/1
1 CAPSULE ORAL
Qty: 0 | Refills: 0 | DISCHARGE

## 2022-11-16 RX ORDER — ESCITALOPRAM OXALATE 10 MG/1
10 TABLET, FILM COATED ORAL DAILY
Refills: 0 | Status: DISCONTINUED | OUTPATIENT
Start: 2022-11-16 | End: 2022-11-19

## 2022-11-16 RX ORDER — HYDROMORPHONE HYDROCHLORIDE 2 MG/ML
0.5 INJECTION INTRAMUSCULAR; INTRAVENOUS; SUBCUTANEOUS EVERY 4 HOURS
Refills: 0 | Status: DISCONTINUED | OUTPATIENT
Start: 2022-11-16 | End: 2022-11-16

## 2022-11-16 RX ADMIN — SODIUM CHLORIDE 500 MILLILITER(S): 9 INJECTION, SOLUTION INTRAVENOUS at 14:03

## 2022-11-16 RX ADMIN — SODIUM CHLORIDE 3 MILLILITER(S): 9 INJECTION INTRAMUSCULAR; INTRAVENOUS; SUBCUTANEOUS at 07:36

## 2022-11-16 RX ADMIN — ATORVASTATIN CALCIUM 40 MILLIGRAM(S): 80 TABLET, FILM COATED ORAL at 22:51

## 2022-11-16 RX ADMIN — CHLORHEXIDINE GLUCONATE 1 APPLICATION(S): 213 SOLUTION TOPICAL at 06:37

## 2022-11-16 RX ADMIN — HEPARIN SODIUM 5000 UNIT(S): 5000 INJECTION INTRAVENOUS; SUBCUTANEOUS at 21:07

## 2022-11-16 NOTE — CONSULT NOTE ADULT - SUBJECTIVE AND OBJECTIVE BOX
Source of information: PHYLLIS MERRITT, Chart review  Patient language: English  : n/a    HPI:  61 y/o female with h/o HLD, migraine headaches, anxiety and depression, diverticulitis, GERD complains of abdominal bloating, dyspepsia and chronic constipation since 2018. She is diagnosed with rectal prolapse and is scheduled for robotic assisted laparoscopic sigmoid resection suture rectopexy and flexible sigmoidoscopy 11/16/22 (01 Nov 2022 12:35)      Pt is s/p sigmoid resection and rectopexy on 11/16 POD #0. S/p duramorph. Pt also with hx chronic hip and knee pain, multiple falls, right hip arthroscopy 2017, right knee arthroscopy 2011 and 2016, cervical ACDF 8/2020, b/l carpal tunnel surgery 2020, 2021, torn ligament over left knee and left hip. Pain consulted for postop pain. Pt seen and examined at bedside. Pt laying in bed, denies pain. Reports mild tenderness over abdomen. Has not yet been out of bed. Denies joint pain at this time. Reports she does not take chronic pain meds at home. Tolerated oxycodone in the past with prior surgeries. Pt tolerating PO clears diet. Denies lethargy, chest pain, SOB, nausea, vomiting. Reports hx constipation, takes Linzess, mineral oil, herbs at home for constipation, last BM 11/16 AM. Patient stated goal for pain control: to be able to take deep breaths, get out of bed to chair and ambulate with tolerable pain control.     PAST MEDICAL & SURGICAL HISTORY:  Bipolar 1 disorder      Anxiety      Depression      GERD (gastroesophageal reflux disease)      Diverticulosis      History of migraine headaches      Constipation      S/P exploratory laparotomy  for endometriosis x 2      H/O arthroscopy  right knee 2011 and 2016      H/O arthroscopy  right hip 2017      H/O cervical spine surgery  C4- C5 (2020)      H/O carpal tunnel repair      History of carpal tunnel repair  left hand 11/2020 with trigger finger repair      H/O carpal tunnel repair  2021 with right thumb trigger finger repair          FAMILY HISTORY:  FH: colon cancer (Mother)        Social History:   [X ] Denies ETOH use, illicit drug use and smoking    Allergies    No Known Allergies    MEDICATIONS  (STANDING):  atorvastatin 40 milliGRAM(s) Oral at bedtime  cyanocobalamin 1000 MICROGram(s) Oral daily  escitalopram 10 milliGRAM(s) Oral daily  heparin   Injectable 5000 Unit(s) SubCutaneous every 8 hours  morphine PF Spinal 0.3 milliGRAM(s) IntraThecal. once  pyridoxine 100 milliGRAM(s) Oral daily  sodium chloride 0.9% with potassium chloride 20 mEq/L 1000 milliLiter(s) (75 mL/Hr) IV Continuous <Continuous>    MEDICATIONS  (PRN):  clonazePAM  Tablet 0.5 milliGRAM(s) Oral three times a day PRN anxiety  HYDROmorphone  Injectable 0.5 milliGRAM(s) IV Push every 4 hours PRN Moderate Pain (4 - 6)  naloxone Injectable 0.1 milliGRAM(s) IV Push every 3 minutes PRN For ANY of the following changes in patient status:  A. RR LESS THAN 10 breaths per minute, B. Oxygen saturation LESS THAN 90%, C. Sedation score of 6  ondansetron Injectable 4 milliGRAM(s) IV Push every 6 hours PRN Nausea  traZODone 50 milliGRAM(s) Oral at bedtime PRN insomnia      Vital Signs Last 24 Hrs  T(C): 36.3 (16 Nov 2022 16:44), Max: 36.8 (16 Nov 2022 12:35)  T(F): 97.4 (16 Nov 2022 16:44), Max: 98.2 (16 Nov 2022 12:35)  HR: 65 (16 Nov 2022 16:44) (56 - 99)  BP: 105/99 (16 Nov 2022 16:44) (82/49 - 106/57)  BP(mean): 71 (16 Nov 2022 16:44) (52 - 71)  RR: 16 (16 Nov 2022 16:44) (14 - 25)  SpO2: 100% (16 Nov 2022 16:44) (90% - 100%)    Parameters below as of 16 Nov 2022 16:44  Patient On (Oxygen Delivery Method): nasal cannula        LABS: Reviewed.    Radiology: Reviewed.   < from: Xray Chest 2 Views PA/Lat (11.01.22 @ 14:51) >    ACC: [id number] EXAM:  XR CHEST PA LAT 2V                          PROCEDURE DATE:  11/01/2022          INTERPRETATION:  PA and lateral chest radiographs    COMPARISON:  NONE.    CLINICAL INFORMATION: Chronic tobacco use..    FINDINGS:    PULMONARY: The airway is midline.  There are no airspace consolidations.  No pleural effusion or pneumothorax.    HEART/VASCULAR: The heart size and mediastinum configuration are within   the limits of normal.    BONES: The visualized osseus structures are intact.    IMPRESSION:    No radiographic evidence of active chest disease..    --- End of Report ---            ARSALAN LEIVA MD; Attending Radiologist  This document has been electronically signed. Nov 1 2022  3:37PM    < end of copied text >      ORT Score -   Family Hx of substance abuse	Female	      Male  Alcohol 	                                           1                     3  Illegal drugs	                                   2                     3  Rx drugs                                           4 	                  4  Personal Hx of substance abuse		  Alcohol 	                                          3	                  3  Illegal drugs                                     4	                  4  Rx drugs                                            5 	                  5  Age between 16- 45 years	           1                     1  hx preadolescent sexual abuse	   3 	                  0  Psychological disease		  ADD, OCD, bipolar, schizophrenia   2	          2  Depression                                           1 	          1  Total: 3    a score of 3 or lower indicates low risk for opioid abuse		  a score of 4-7 indicates moderate risk for opioid abuse		  a score of 8 or higher indicates high risk for opioid abuse  	  REVIEW OF SYSTEMS:  CONSTITUTIONAL: No fever + fatigue  RESPIRATORY: No cough, wheezing, chills or hemoptysis; No shortness of breath  CARDIOVASCULAR: No chest pain, palpitations, dizziness, or leg swelling  GASTROINTESTINAL: No loss of appetite, decreased PO intake. No abdominal or epigastric pain. No nausea, vomiting; No diarrhea or constipation.   GENITOURINARY: No dysuria, frequency, hematuria, retention or incontinence  MUSCULOSKELETAL: + hx chronic hip and knee pain. No joint pain swelling; + b/l leg heaviness; No back pain. no upper or lower motor strength weakness, no saddle anesthesia, bowel/bladder incontinence, + hx falls   NEURO: No headaches, No numbness/tingling b/l LE, No weakness  PSYCHIATRIC: + bipolar 1, depression & anxiety     PHYSICAL EXAM:  GENERAL: + anxious, verbose; Alert & Oriented X4, cooperative, NAD, Good concentration. Speech is clear.   RESPIRATORY: Respirations even and unlabored. Clear to auscultation bilaterally; No rales, rhonchi, wheezing, or rubs; O2 3L NC   CARDIOVASCULAR: Normal S1/S2, regular rate and rhythm; No murmurs, rubs, or gallops. No JVD.   GASTROINTESTINAL:  Soft, + mildly tender, + steristrips c/d/i; Nondistended; Bowel sounds present  PERIPHERAL VASCULAR:  Extremities warm without edema. 2+ Peripheral Pulses, No cyanosis, No calf tenderness  MUSCULOSKELETAL: Motor Strength 5/5 B/L upper and lower extremities; moves all extremities equally against gravity; ROM intact; negative SLR; No tenderness on palpation of all joints.   SKIN: Warm, dry, intact. No rashes, lesions, scars or wounds.     Risk factors associated with adverse outcomes related to opioid treatment  [X ]  Concurrent benzodiazepine use  [ ]  History/ Active substance use or alcohol use disorder  [X ] Psychiatric co-morbidity  [ ] Sleep apnea  [ ] COPD  [ ] BMI> 35  [ ] Liver dysfunction  [ ] Renal dysfunction  [ ] CHF  [ ] Smoker  [ ]  Age > 60 years    [X ]  [place]  Reviewed and Copied to Chart. See below.    Plan of care and goal oriented pain management treatment options were discussed with patient and /or primary care giver; all questions and concerns were addressed and care was aligned with patient's wishes.    Educated patient on goal oriented pain management treatment options

## 2022-11-16 NOTE — PATIENT PROFILE ADULT - FALL HARM RISK - UNIVERSAL INTERVENTIONS
Bed in lowest position, wheels locked, appropriate side rails in place/Call bell, personal items and telephone in reach/Instruct patient to call for assistance before getting out of bed or chair/Non-slip footwear when patient is out of bed/[place] to call system/Physically safe environment - no spills, clutter or unnecessary equipment/Purposeful Proactive Rounding/Room/bathroom lighting operational, light cord in reach

## 2022-11-16 NOTE — CONSULT NOTE ADULT - PROBLEM SELECTOR RECOMMENDATION 9
Pt with acute abdominal pain which is somatic and neuropathic in nature due to s/p sigmoid resection and rectopexy on 11/16 POD #0. Pt also with hx chronic hip and knee pain.   Opioid pain recommendations   - S/p duramorph on 11/16. Monitor for respiratory depression/ sedation. Continuous pulse ox x 24hr.   - Discontinue IV dilaudid PRN  - Oxycodone 5 mg PO q 4 hours PRN severe pain. Monitor for sedation/ respiratory depression.   Non-opioid pain recommendations   - NSAIDs per surgical team.   - Acetaminophen 1 gram PO q 6 hours PRN moderate pain.   Bowel Regimen  - Per surgical team  Mild pain   - Non-pharmacological pain treatment recommendations  - Warm/ Cool packs PRN   - Repositioning, imagery, relaxation, distraction.  - Physical therapy OOB if no contraindications   Recommendations discussed with primary team and RN

## 2022-11-16 NOTE — CONSULT NOTE ADULT - ASSESSMENT
Confidential Drug Utilization Report  Search Terms: Manuela White, 1960Search Date: 11/16/2022 16:59:38 PM  The Drug Utilization Report below displays all of the controlled substance prescriptions, if any, that your patient has filled in the last twelve months. The information displayed on this report is compiled from pharmacy submissions to the Department, and accurately reflects the information as submitted by the pharmacies.    This report was requested by: Raysa Mcgill | Reference #: [id number]    You have not added a EMMA number. Keeping your EMMA number(s) up to date on the My EMMA # page will enable the separation of your prescriptions from others in the search results.    Others' Prescriptions  Patient Name: Manuela WhiteBirth Date: 1960  Address: [address] [place] [id number]: Female  Rx Written	Rx Dispensed	Drug	Quantity	Days Supply	Prescriber Name	Prescriber Emma #	Payment Method	Dispenser  09/24/2022	10/19/2022	clonazepam 0.5 mg tablet	30	30	Chicho Romero	[id number]	Medicare	[facility]  08/22/2022	08/29/2022	clonazepam 0.5 mg tablet	30	30	Chicho Romero	[id number]	Medicare	[facility]  07/18/2022	07/27/2022	clonazepam 0.5 mg tablet	30	30	Chicho Romero	[id number]	Medicare	[facility]  06/12/2022	06/26/2022	zolpidem tartrate 5 mg tablet	30	30	Simón, Holly L	[id number]	Medicare	[facility]  06/12/2022	06/26/2022	clonazepam 0.5 mg tablet	30	30	Simón, Holly L	[id number]	Medicare	[facility]  05/16/2022	05/27/2022	zolpidem tartrate 5 mg tablet	30	30	Simón, Holly L	[id number]	Medicare	[facility]  05/16/2022	05/18/2022	clonazepam 0.5 mg tablet	30	30	Simón, Holly L	[id number]	Medicare	[facility]  04/12/2022	04/21/2022	zolpidem tartrate 5 mg tablet	30	30	Simón, Holly L	[id number]	Medicare	[facility]  03/29/2022	03/31/2022	clonazepam 0.5 mg tablet	30	30	Holly Gr	[id number]	Medicare	[facility]  02/15/2022	02/17/2022	alprazolam 0.5 mg tablet	20	20	Holly Gr	[id number]	Medicare	[facility]  12/05/2021	12/08/2021	alprazolam 0.5 mg tablet	20	30	Ashley Millan	[id number]	Medicare	[facility]  * - Drugs marked with an asterisk are compound drugs. If the compound drug is made up of more than one controlled substance, then each controlled substance will be a separate row in the table.

## 2022-11-16 NOTE — PATIENT PROFILE ADULT - HOW PATIENT ADDRESSED, PROFILE
Prescription done for propranolol 10 mg to be taken 3 times daily. Prescription refills done for Wellbutrin and acyclovir.   Myrbetriqis through Urology Manuela

## 2022-11-16 NOTE — CHART NOTE - NSCHARTNOTEFT_GEN_A_CORE
Pt POD 0 s/p Sigmoid resection and rectopexy   resting comfortably  no n/v  nava clear 150cc over past 2 hrs    Vital Signs Last 24 Hrs  T(C): 36.4 (16 Nov 2022 21:51), Max: 36.8 (16 Nov 2022 12:35)  T(F): 97.5 (16 Nov 2022 21:51), Max: 98.2 (16 Nov 2022 12:35)  HR: 60 (16 Nov 2022 21:51) (56 - 99)  BP: 101/53 (16 Nov 2022 21:51) (82/49 - 106/57)  BP(mean): 65 (16 Nov 2022 21:51) (52 - 71)  RR: 18 (16 Nov 2022 21:51) (14 - 25)  SpO2: 100% (16 Nov 2022 21:51) (90% - 100%)    Parameters below as of 16 Nov 2022 21:51  Patient On (Oxygen Delivery Method): nasal cannula    abd soft, inc/dsg CDI    stable post-op

## 2022-11-17 LAB
ANION GAP SERPL CALC-SCNC: 4 MMOL/L — LOW (ref 5–17)
BUN SERPL-MCNC: 12 MG/DL — SIGNIFICANT CHANGE UP (ref 7–18)
CALCIUM SERPL-MCNC: 8.9 MG/DL — SIGNIFICANT CHANGE UP (ref 8.4–10.5)
CHLORIDE SERPL-SCNC: 107 MMOL/L — SIGNIFICANT CHANGE UP (ref 96–108)
CO2 SERPL-SCNC: 28 MMOL/L — SIGNIFICANT CHANGE UP (ref 22–31)
CREAT SERPL-MCNC: 0.86 MG/DL — SIGNIFICANT CHANGE UP (ref 0.5–1.3)
EGFR: 77 ML/MIN/1.73M2 — SIGNIFICANT CHANGE UP
GLUCOSE SERPL-MCNC: 87 MG/DL — SIGNIFICANT CHANGE UP (ref 70–99)
HCT VFR BLD CALC: 34 % — LOW (ref 34.5–45)
HGB BLD-MCNC: 10.7 G/DL — LOW (ref 11.5–15.5)
MAGNESIUM SERPL-MCNC: 1.9 MG/DL — SIGNIFICANT CHANGE UP (ref 1.6–2.6)
MCHC RBC-ENTMCNC: 27.8 PG — SIGNIFICANT CHANGE UP (ref 27–34)
MCHC RBC-ENTMCNC: 31.5 GM/DL — LOW (ref 32–36)
MCV RBC AUTO: 88.3 FL — SIGNIFICANT CHANGE UP (ref 80–100)
NRBC # BLD: 0 /100 WBCS — SIGNIFICANT CHANGE UP (ref 0–0)
PHOSPHATE SERPL-MCNC: 2.3 MG/DL — LOW (ref 2.5–4.5)
PLATELET # BLD AUTO: 192 K/UL — SIGNIFICANT CHANGE UP (ref 150–400)
POTASSIUM SERPL-MCNC: 3.8 MMOL/L — SIGNIFICANT CHANGE UP (ref 3.5–5.3)
POTASSIUM SERPL-SCNC: 3.8 MMOL/L — SIGNIFICANT CHANGE UP (ref 3.5–5.3)
RBC # BLD: 3.85 M/UL — SIGNIFICANT CHANGE UP (ref 3.8–5.2)
RBC # FLD: 13.8 % — SIGNIFICANT CHANGE UP (ref 10.3–14.5)
SODIUM SERPL-SCNC: 139 MMOL/L — SIGNIFICANT CHANGE UP (ref 135–145)
WBC # BLD: 13.31 K/UL — HIGH (ref 3.8–10.5)
WBC # FLD AUTO: 13.31 K/UL — HIGH (ref 3.8–10.5)

## 2022-11-17 PROCEDURE — 99232 SBSQ HOSP IP/OBS MODERATE 35: CPT

## 2022-11-17 RX ORDER — POTASSIUM PHOSPHATE, MONOBASIC POTASSIUM PHOSPHATE, DIBASIC 236; 224 MG/ML; MG/ML
15 INJECTION, SOLUTION INTRAVENOUS ONCE
Refills: 0 | Status: COMPLETED | OUTPATIENT
Start: 2022-11-17 | End: 2022-11-17

## 2022-11-17 RX ORDER — ACETAMINOPHEN 500 MG
1000 TABLET ORAL ONCE
Refills: 0 | Status: COMPLETED | OUTPATIENT
Start: 2022-11-17 | End: 2022-11-17

## 2022-11-17 RX ADMIN — POTASSIUM PHOSPHATE, MONOBASIC POTASSIUM PHOSPHATE, DIBASIC 62.5 MILLIMOLE(S): 236; 224 INJECTION, SOLUTION INTRAVENOUS at 12:15

## 2022-11-17 RX ADMIN — Medication 1000 MILLIGRAM(S): at 09:05

## 2022-11-17 RX ADMIN — Medication 1000 MILLIGRAM(S): at 16:03

## 2022-11-17 RX ADMIN — Medication 15 MILLIGRAM(S): at 20:12

## 2022-11-17 RX ADMIN — HEPARIN SODIUM 5000 UNIT(S): 5000 INJECTION INTRAVENOUS; SUBCUTANEOUS at 21:26

## 2022-11-17 RX ADMIN — Medication 0.5 MILLIGRAM(S): at 09:42

## 2022-11-17 RX ADMIN — ESCITALOPRAM OXALATE 10 MILLIGRAM(S): 10 TABLET, FILM COATED ORAL at 13:31

## 2022-11-17 RX ADMIN — OXYCODONE HYDROCHLORIDE 5 MILLIGRAM(S): 5 TABLET ORAL at 22:20

## 2022-11-17 RX ADMIN — Medication 400 MILLIGRAM(S): at 08:48

## 2022-11-17 RX ADMIN — Medication 50 MILLIGRAM(S): at 21:26

## 2022-11-17 RX ADMIN — OXYCODONE HYDROCHLORIDE 5 MILLIGRAM(S): 5 TABLET ORAL at 21:26

## 2022-11-17 RX ADMIN — Medication 15 MILLIGRAM(S): at 13:50

## 2022-11-17 RX ADMIN — HEPARIN SODIUM 5000 UNIT(S): 5000 INJECTION INTRAVENOUS; SUBCUTANEOUS at 05:13

## 2022-11-17 RX ADMIN — Medication 15 MILLIGRAM(S): at 05:30

## 2022-11-17 RX ADMIN — Medication 1000 MILLIGRAM(S): at 02:23

## 2022-11-17 RX ADMIN — Medication 100 MILLIGRAM(S): at 13:30

## 2022-11-17 RX ADMIN — PREGABALIN 1000 MICROGRAM(S): 225 CAPSULE ORAL at 13:31

## 2022-11-17 RX ADMIN — Medication 15 MILLIGRAM(S): at 19:42

## 2022-11-17 RX ADMIN — Medication 15 MILLIGRAM(S): at 13:34

## 2022-11-17 RX ADMIN — ATORVASTATIN CALCIUM 40 MILLIGRAM(S): 80 TABLET, FILM COATED ORAL at 21:26

## 2022-11-17 RX ADMIN — Medication 15 MILLIGRAM(S): at 05:13

## 2022-11-17 RX ADMIN — Medication 400 MILLIGRAM(S): at 15:53

## 2022-11-17 RX ADMIN — HEPARIN SODIUM 5000 UNIT(S): 5000 INJECTION INTRAVENOUS; SUBCUTANEOUS at 13:31

## 2022-11-17 RX ADMIN — Medication 400 MILLIGRAM(S): at 01:58

## 2022-11-17 NOTE — PROGRESS NOTE ADULT - PROBLEM SELECTOR PLAN 1
Pt with acute abdominal pain which is somatic and neuropathic in nature due to s/p sigmoid resection and rectopexy on 11/16 POD #1. Pt also with hx chronic hip and knee pain.   Opioid pain recommendations   - S/p duramorph on 11/16. Monitor for respiratory depression/ sedation. Continuous pulse ox x 24hr.   - Continue oxycodone 5 mg PO q 4 hours PRN severe pain. Monitor for sedation/ respiratory depression.   Non-opioid pain recommendations   - Continue toradol 15mg IV q6h x 24hr as per surgical team.   - Renew Acetaminophen 1 gram IV q 6 hours x 24hr.  Bowel Regimen  - Per surgical team  Mild pain   - Non-pharmacological pain treatment recommendations  - Warm/ Cool packs PRN   - Repositioning, imagery, relaxation, distraction.  - Physical therapy OOB if no contraindications   Recommendations discussed with primary team and RN.

## 2022-11-18 ENCOUNTER — TRANSCRIPTION ENCOUNTER (OUTPATIENT)
Age: 62
End: 2022-11-18

## 2022-11-18 LAB
ANION GAP SERPL CALC-SCNC: 5 MMOL/L — SIGNIFICANT CHANGE UP (ref 5–17)
BUN SERPL-MCNC: 9 MG/DL — SIGNIFICANT CHANGE UP (ref 7–18)
CALCIUM SERPL-MCNC: 9 MG/DL — SIGNIFICANT CHANGE UP (ref 8.4–10.5)
CHLORIDE SERPL-SCNC: 111 MMOL/L — HIGH (ref 96–108)
CO2 SERPL-SCNC: 27 MMOL/L — SIGNIFICANT CHANGE UP (ref 22–31)
CREAT SERPL-MCNC: 0.9 MG/DL — SIGNIFICANT CHANGE UP (ref 0.5–1.3)
EGFR: 73 ML/MIN/1.73M2 — SIGNIFICANT CHANGE UP
GLUCOSE SERPL-MCNC: 94 MG/DL — SIGNIFICANT CHANGE UP (ref 70–99)
HCT VFR BLD CALC: 32.6 % — LOW (ref 34.5–45)
HGB BLD-MCNC: 10.3 G/DL — LOW (ref 11.5–15.5)
MAGNESIUM SERPL-MCNC: 2.2 MG/DL — SIGNIFICANT CHANGE UP (ref 1.6–2.6)
MCHC RBC-ENTMCNC: 27.9 PG — SIGNIFICANT CHANGE UP (ref 27–34)
MCHC RBC-ENTMCNC: 31.6 GM/DL — LOW (ref 32–36)
MCV RBC AUTO: 88.3 FL — SIGNIFICANT CHANGE UP (ref 80–100)
NRBC # BLD: 0 /100 WBCS — SIGNIFICANT CHANGE UP (ref 0–0)
PHOSPHATE SERPL-MCNC: 2.2 MG/DL — LOW (ref 2.5–4.5)
PLATELET # BLD AUTO: 194 K/UL — SIGNIFICANT CHANGE UP (ref 150–400)
POTASSIUM SERPL-MCNC: 4.1 MMOL/L — SIGNIFICANT CHANGE UP (ref 3.5–5.3)
POTASSIUM SERPL-SCNC: 4.1 MMOL/L — SIGNIFICANT CHANGE UP (ref 3.5–5.3)
RBC # BLD: 3.69 M/UL — LOW (ref 3.8–5.2)
RBC # FLD: 14.2 % — SIGNIFICANT CHANGE UP (ref 10.3–14.5)
SODIUM SERPL-SCNC: 143 MMOL/L — SIGNIFICANT CHANGE UP (ref 135–145)
WBC # BLD: 8.2 K/UL — SIGNIFICANT CHANGE UP (ref 3.8–10.5)
WBC # FLD AUTO: 8.2 K/UL — SIGNIFICANT CHANGE UP (ref 3.8–10.5)

## 2022-11-18 RX ORDER — ACETAMINOPHEN 500 MG
1000 TABLET ORAL EVERY 8 HOURS
Refills: 0 | Status: DISCONTINUED | OUTPATIENT
Start: 2022-11-18 | End: 2022-11-19

## 2022-11-18 RX ORDER — MAGNESIUM OXIDE 400 MG ORAL TABLET 241.3 MG
800 TABLET ORAL EVERY 12 HOURS
Refills: 0 | Status: DISCONTINUED | OUTPATIENT
Start: 2022-11-18 | End: 2022-11-19

## 2022-11-18 RX ADMIN — OXYCODONE HYDROCHLORIDE 5 MILLIGRAM(S): 5 TABLET ORAL at 06:42

## 2022-11-18 RX ADMIN — OXYCODONE HYDROCHLORIDE 5 MILLIGRAM(S): 5 TABLET ORAL at 05:14

## 2022-11-18 RX ADMIN — OXYCODONE HYDROCHLORIDE 5 MILLIGRAM(S): 5 TABLET ORAL at 09:46

## 2022-11-18 RX ADMIN — HEPARIN SODIUM 5000 UNIT(S): 5000 INJECTION INTRAVENOUS; SUBCUTANEOUS at 16:06

## 2022-11-18 RX ADMIN — MAGNESIUM OXIDE 400 MG ORAL TABLET 800 MILLIGRAM(S): 241.3 TABLET ORAL at 16:06

## 2022-11-18 RX ADMIN — ATORVASTATIN CALCIUM 40 MILLIGRAM(S): 80 TABLET, FILM COATED ORAL at 21:17

## 2022-11-18 RX ADMIN — OXYCODONE HYDROCHLORIDE 5 MILLIGRAM(S): 5 TABLET ORAL at 16:06

## 2022-11-18 RX ADMIN — PREGABALIN 1000 MICROGRAM(S): 225 CAPSULE ORAL at 11:49

## 2022-11-18 RX ADMIN — OXYCODONE HYDROCHLORIDE 5 MILLIGRAM(S): 5 TABLET ORAL at 16:52

## 2022-11-18 RX ADMIN — Medication 100 MILLIGRAM(S): at 11:50

## 2022-11-18 RX ADMIN — OXYCODONE HYDROCHLORIDE 5 MILLIGRAM(S): 5 TABLET ORAL at 14:30

## 2022-11-18 RX ADMIN — HEPARIN SODIUM 5000 UNIT(S): 5000 INJECTION INTRAVENOUS; SUBCUTANEOUS at 21:17

## 2022-11-18 RX ADMIN — ESCITALOPRAM OXALATE 10 MILLIGRAM(S): 10 TABLET, FILM COATED ORAL at 11:50

## 2022-11-18 NOTE — DISCHARGE NOTE PROVIDER - NSDCMRMEDTOKEN_GEN_ALL_CORE_FT
clonazePAM 0.5 mg oral tablet: 1 tab(s) orally 3 times a day, As Needed  escitalopram 5 mg oral tablet: 2 tab(s) orally once a day  Linzess 290 mcg oral capsule: 1 cap(s) orally once a day  magnesium citrate 1.745 g/30 mL oral liquid: 300 milliliter(s) orally once a day   pantoprazole 40 mg oral delayed release tablet: 1 tab(s) orally once a day  rosuvastatin 10 mg oral capsule: 1 cap(s) orally once a day  traZODone 50 mg oral tablet: 1 tab(s) orally once a day (at bedtime), As needed, insomnia  Ubrelvy 100 mg oral tablet: 1 tab(s) orally once  Vitamin B12 1000 mcg oral tablet: 1 tab(s) orally once a day  Vitamin B6 100 mg oral tablet: 1 tab(s) orally once a day  Vitamin D3 1250 mcg (50,000 intl units) oral capsule: 1 cap(s) orally once a week   clonazePAM 0.5 mg oral tablet: 1 tab(s) orally 3 times a day, As Needed  escitalopram 5 mg oral tablet: 2 tab(s) orally once a day  Linzess 290 mcg oral capsule: 1 cap(s) orally once a day  magnesium citrate 1.745 g/30 mL oral liquid: 300 milliliter(s) orally once a day   oxyCODONE 5 mg oral tablet: 1 tab(s) orally every 6 hours MDD:4  pantoprazole 40 mg oral delayed release tablet: 1 tab(s) orally once a day  rosuvastatin 10 mg oral capsule: 1 cap(s) orally once a day  traZODone 50 mg oral tablet: 1 tab(s) orally once a day (at bedtime), As needed, insomnia  Ubrelvy 100 mg oral tablet: 1 tab(s) orally once  Vitamin B12 1000 mcg oral tablet: 1 tab(s) orally once a day  Vitamin B6 100 mg oral tablet: 1 tab(s) orally once a day  Vitamin D3 1250 mcg (50,000 intl units) oral capsule: 1 cap(s) orally once a week

## 2022-11-18 NOTE — DISCHARGE NOTE PROVIDER - NSDCFUSCHEDAPPT_GEN_ALL_CORE_FT
Noel Ojeda Physician [facility]  NEUROLOGY [address]  Scheduled Appointment: 12/02/2022

## 2022-11-18 NOTE — DISCHARGE NOTE PROVIDER - HOSPITAL COURSE
61 y/o female with h/o HLD, migraine headaches, anxiety and depression, diverticulitis, GERD underwent robotic assisted laparoscopic sigmoid resection suture rectopexy and flexible sigmoidoscopy 11/16/22. Pt stable post operatively, tolerated PO diet, and discharged.

## 2022-11-18 NOTE — DISCHARGE NOTE PROVIDER - NSDCCPCAREPLAN_GEN_ALL_CORE_FT
PRINCIPAL DISCHARGE DIAGNOSIS  Diagnosis: Rectal prolapse  Assessment and Plan of Treatment: Please see Dr. Rubio's discharge instructions

## 2022-11-18 NOTE — DISCHARGE NOTE PROVIDER - CARE PROVIDER_API CALL
Magui Rubio)  Surgery  [address] [facility], [address]  [place] [address]  Phone: [phone]  Fax: [phone]  Follow Up Time:

## 2022-11-18 NOTE — PROGRESS NOTE ADULT - NS ATTEND AMEND GEN_ALL_CORE FT
Pt passing flatus and tolerating diet though expected lower appetite  Manageable incisional pain    abd soft, minimally distended, incisions c/d/i w/ steristrips in place    - continue LRD  - encourage ambulation  - PVR to make sure emptying  - if still passing flatus and tolerating diet can likely d/c home tomorrow    Adekemi MD Joanne  Attending Physician

## 2022-11-19 ENCOUNTER — TRANSCRIPTION ENCOUNTER (OUTPATIENT)
Age: 62
End: 2022-11-19

## 2022-11-19 VITALS
DIASTOLIC BLOOD PRESSURE: 60 MMHG | OXYGEN SATURATION: 92 % | TEMPERATURE: 98 F | SYSTOLIC BLOOD PRESSURE: 127 MMHG | HEART RATE: 65 BPM | RESPIRATION RATE: 18 BRPM

## 2022-11-19 LAB
ANION GAP SERPL CALC-SCNC: 4 MMOL/L — LOW (ref 5–17)
BUN SERPL-MCNC: 6 MG/DL — LOW (ref 7–18)
CALCIUM SERPL-MCNC: 9.5 MG/DL — SIGNIFICANT CHANGE UP (ref 8.4–10.5)
CHLORIDE SERPL-SCNC: 106 MMOL/L — SIGNIFICANT CHANGE UP (ref 96–108)
CO2 SERPL-SCNC: 30 MMOL/L — SIGNIFICANT CHANGE UP (ref 22–31)
CREAT SERPL-MCNC: 0.84 MG/DL — SIGNIFICANT CHANGE UP (ref 0.5–1.3)
EGFR: 80 ML/MIN/1.73M2 — SIGNIFICANT CHANGE UP
GLUCOSE SERPL-MCNC: 107 MG/DL — HIGH (ref 70–99)
HCT VFR BLD CALC: 34.2 % — LOW (ref 34.5–45)
HGB BLD-MCNC: 11.1 G/DL — LOW (ref 11.5–15.5)
MCHC RBC-ENTMCNC: 28.1 PG — SIGNIFICANT CHANGE UP (ref 27–34)
MCHC RBC-ENTMCNC: 32.5 GM/DL — SIGNIFICANT CHANGE UP (ref 32–36)
MCV RBC AUTO: 86.6 FL — SIGNIFICANT CHANGE UP (ref 80–100)
NRBC # BLD: 0 /100 WBCS — SIGNIFICANT CHANGE UP (ref 0–0)
PLATELET # BLD AUTO: 220 K/UL — SIGNIFICANT CHANGE UP (ref 150–400)
POTASSIUM SERPL-MCNC: 4 MMOL/L — SIGNIFICANT CHANGE UP (ref 3.5–5.3)
POTASSIUM SERPL-SCNC: 4 MMOL/L — SIGNIFICANT CHANGE UP (ref 3.5–5.3)
RBC # BLD: 3.95 M/UL — SIGNIFICANT CHANGE UP (ref 3.8–5.2)
RBC # FLD: 13.9 % — SIGNIFICANT CHANGE UP (ref 10.3–14.5)
SODIUM SERPL-SCNC: 140 MMOL/L — SIGNIFICANT CHANGE UP (ref 135–145)
WBC # BLD: 8.1 K/UL — SIGNIFICANT CHANGE UP (ref 3.8–10.5)
WBC # FLD AUTO: 8.1 K/UL — SIGNIFICANT CHANGE UP (ref 3.8–10.5)

## 2022-11-19 PROCEDURE — C1889: CPT

## 2022-11-19 PROCEDURE — 83735 ASSAY OF MAGNESIUM: CPT

## 2022-11-19 PROCEDURE — 36415 COLL VENOUS BLD VENIPUNCTURE: CPT

## 2022-11-19 PROCEDURE — 86923 COMPATIBILITY TEST ELECTRIC: CPT

## 2022-11-19 PROCEDURE — 84100 ASSAY OF PHOSPHORUS: CPT

## 2022-11-19 PROCEDURE — 86850 RBC ANTIBODY SCREEN: CPT

## 2022-11-19 PROCEDURE — 88307 TISSUE EXAM BY PATHOLOGIST: CPT

## 2022-11-19 PROCEDURE — 80048 BASIC METABOLIC PNL TOTAL CA: CPT

## 2022-11-19 PROCEDURE — 88304 TISSUE EXAM BY PATHOLOGIST: CPT

## 2022-11-19 PROCEDURE — 86900 BLOOD TYPING SEROLOGIC ABO: CPT

## 2022-11-19 PROCEDURE — 86901 BLOOD TYPING SEROLOGIC RH(D): CPT

## 2022-11-19 PROCEDURE — S2900: CPT

## 2022-11-19 PROCEDURE — 85027 COMPLETE CBC AUTOMATED: CPT

## 2022-11-19 RX ORDER — OXYCODONE HYDROCHLORIDE 5 MG/1
1 TABLET ORAL
Qty: 8 | Refills: 0
Start: 2022-11-19 | End: 2022-11-20

## 2022-11-19 RX ORDER — OXYCODONE HYDROCHLORIDE 5 MG/1
1 TABLET ORAL
Qty: 8 | Refills: 0
Start: 2022-11-19

## 2022-11-19 RX ADMIN — OXYCODONE HYDROCHLORIDE 5 MILLIGRAM(S): 5 TABLET ORAL at 10:35

## 2022-11-19 RX ADMIN — OXYCODONE HYDROCHLORIDE 5 MILLIGRAM(S): 5 TABLET ORAL at 03:52

## 2022-11-19 RX ADMIN — OXYCODONE HYDROCHLORIDE 5 MILLIGRAM(S): 5 TABLET ORAL at 02:58

## 2022-11-19 RX ADMIN — MAGNESIUM OXIDE 400 MG ORAL TABLET 800 MILLIGRAM(S): 241.3 TABLET ORAL at 05:54

## 2022-11-19 RX ADMIN — Medication 100 MILLIGRAM(S): at 11:46

## 2022-11-19 RX ADMIN — PREGABALIN 1000 MICROGRAM(S): 225 CAPSULE ORAL at 11:46

## 2022-11-19 RX ADMIN — HEPARIN SODIUM 5000 UNIT(S): 5000 INJECTION INTRAVENOUS; SUBCUTANEOUS at 05:54

## 2022-11-19 RX ADMIN — MAGNESIUM OXIDE 400 MG ORAL TABLET 800 MILLIGRAM(S): 241.3 TABLET ORAL at 06:05

## 2022-11-19 RX ADMIN — ESCITALOPRAM OXALATE 10 MILLIGRAM(S): 10 TABLET, FILM COATED ORAL at 11:46

## 2022-11-19 NOTE — DISCHARGE NOTE NURSING/CASE MANAGEMENT/SOCIAL WORK - NSDCPEFALRISK_GEN_ALL_CORE
For information on Fall & Injury Prevention, visit: [email or web address] OR  [email or web address] OR  https://www.cdc.gov/steadi/patient.html

## 2022-11-19 NOTE — PROGRESS NOTE ADULT - ASSESSMENT
62 yoF s/p robotic assisted sigmoid resection and rectopexy 11/16    afeb, vss; labs wnl  increased distention, +flatus no bowel movement  no n/v  +urinary hesistancy    - continue low fiber as tolerated  - check PVRs  - dvt/gi ppx, pain control, IS  - ambulate  - possible d/c if bloatedness resolves and +BF  - d/w attending    
61 y/o Female s/p Robotic Assisted Sigmoid resection and rectopexy 11/16; Hypophosphatemia     -Replace Phosphorus levels   -Advance diet to low fiber   -Pain medication PRN   -C/w Home Medication   -DC nava catheter, TOV   -OOB/ Ambulate   -DVT ppx   
62 yoF s/p robotic assisted sigmoid resection and rectopexy 11/16  Tolerating diet, +flatus  Afebriles, labs wnl    - continue LFD  - pain control PRN  - d/c planning   - d/w Dr. Donald
Confidential Drug Utilization Report  Search Terms: Manuela White, 1960Search Date: 11/16/2022 16:59:38 PM  The Drug Utilization Report below displays all of the controlled substance prescriptions, if any, that your patient has filled in the last twelve months. The information displayed on this report is compiled from pharmacy submissions to the Department, and accurately reflects the information as submitted by the pharmacies.    This report was requested by: Raysa Mcgill | Reference #: [id number]    You have not added a EMMA number. Keeping your EMMA number(s) up to date on the My EMMA # page will enable the separation of your prescriptions from others in the search results.    Others' Prescriptions  Patient Name: Manuela WhiteBirth Date: 1960  Address: [address] [place] [id number]: Female  Rx Written	Rx Dispensed	Drug	Quantity	Days Supply	Prescriber Name	Prescriber Emma #	Payment Method	Dispenser  09/24/2022	10/19/2022	clonazepam 0.5 mg tablet	30	30	Chicho Romero	[id number]	Medicare	[facility]  08/22/2022	08/29/2022	clonazepam 0.5 mg tablet	30	30	Chicho Romero	[id number]	Medicare	[facility]  07/18/2022	07/27/2022	clonazepam 0.5 mg tablet	30	30	Chicho Romero	[id number]	Medicare	[facility]  06/12/2022	06/26/2022	zolpidem tartrate 5 mg tablet	30	30	Simón, Holly L	[id number]	Medicare	[facility]  06/12/2022	06/26/2022	clonazepam 0.5 mg tablet	30	30	Simón, Holly L	[id number]	Medicare	[facility]  05/16/2022	05/27/2022	zolpidem tartrate 5 mg tablet	30	30	Simón, Holly L	[id number]	Medicare	[facility]  05/16/2022	05/18/2022	clonazepam 0.5 mg tablet	30	30	Simón, Holly L	[id number]	Medicare	[facility]  04/12/2022	04/21/2022	zolpidem tartrate 5 mg tablet	30	30	Simón, Holly L	[id number]	Medicare	[facility]  03/29/2022	03/31/2022	clonazepam 0.5 mg tablet	30	30	Holly Gr	[id number]	Medicare	[facility]  02/15/2022	02/17/2022	alprazolam 0.5 mg tablet	20	20	Holly Gr	[id number]	Medicare	[facility]  12/05/2021	12/08/2021	alprazolam 0.5 mg tablet	20	30	Ashley Millan	[id number]	Medicare	[facility]  * - Drugs marked with an asterisk are compound drugs. If the compound drug is made up of more than one controlled substance, then each controlled substance will be a separate row in the table.

## 2022-11-19 NOTE — DISCHARGE NOTE NURSING/CASE MANAGEMENT/SOCIAL WORK - PATIENT PORTAL LINK FT
You can access the FollowMyHealth Patient Portal offered by [facility] by registering at the following website: [email or web address]. By joining [employer]’s FollowMyHealth portal, you will also be able to view your health information using other applications (apps) compatible with our system.

## 2022-11-19 NOTE — PROGRESS NOTE ADULT - SUBJECTIVE AND OBJECTIVE BOX
INTERVAL HPI/OVERNIGHT EVENTS:    Pt seen and examined at bedside. Admits to incisional pain, along with pruritis; denies nausea or vomiting. Admits to flatus, no BM.   On clear diet, tolerating well.     Vital Signs Last 24 Hrs  T(C): 36.7 (17 Nov 2022 05:13), Max: 36.8 (16 Nov 2022 12:35)  T(F): 98 (17 Nov 2022 05:13), Max: 98.2 (16 Nov 2022 12:35)  HR: 73 (17 Nov 2022 05:13) (56 - 80)  BP: 103/55 (17 Nov 2022 05:13) (82/49 - 106/57)  BP(mean): 67 (17 Nov 2022 05:13) (52 - 71)  RR: 18 (17 Nov 2022 05:13) (14 - 25)  SpO2: 99% (17 Nov 2022 05:13) (90% - 100%)    Parameters below as of 17 Nov 2022 05:13  Patient On (Oxygen Delivery Method): room air      I&O's Detail    16 Nov 2022 07:01  -  17 Nov 2022 07:00  --------------------------------------------------------  IN:    sodium chloride 0.9% w/ Additives: 320 mL  Total IN: 320 mL    OUT:    Indwelling Catheter - Urethral (mL): 1100 mL  Total OUT: 1100 mL    Total NET: -780 mL      naloxone Injectable 0.1 milliGRAM(s) IV Push every 3 minutes PRN      Physical Exam  General: AAOx3, No acute distress  Skin: No jaundice, no icterus  Abdomen: soft,  nondistended, min incisional TTP, dressing c/d/i, no rebound tenderness, no guarding, no palpable masses  : Normal external genitalia, nava catheter in place, UO yellow and clear  Extremities: non edematous, no calf pain bilaterally        Labs:                        10.7   13.31 )-----------( 192      ( 17 Nov 2022 07:22 )             34.0     11-17    139  |  107  |  12  ----------------------------<  87  3.8   |  28  |  0.86    Ca    8.9      17 Nov 2022 07:22  Phos  2.3     11-17  Mg     1.9     11-17          
  Source of information: PHYLLIS MERRITT, Chart review  Patient language: English  : n/a    HPI:  63 y/o female with h/o HLD, migraine headaches, anxiety and depression, diverticulitis, GERD complains of abdominal bloating, dyspepsia and chronic constipation since 2018. She is diagnosed with rectal prolapse and is scheduled for robotic assisted laparoscopic sigmoid resection suture rectopexy and flexible sigmoidoscopy 11/16/22 (01 Nov 2022 12:35)      Pt is s/p sigmoid resection and rectopexy on 11/16 POD #1. S/p duramorph. Pt also with hx chronic hip and knee pain, multiple falls, right hip arthroscopy 2017, right knee arthroscopy 2011 and 2016, cervical ACDF 8/2020, b/l carpal tunnel surgery 2020, 2021, torn ligament over left knee and left hip. Pain consulted for postop pain. Pt seen and examined at bedside. Pt laying in bed, reports mild abdomen pain, rating pain 6/10 and tolerable, aching, non-radiating, alleviated by pain medications, exacerbated by movement. Denies joint pain at this time. Reports she does not take chronic pain meds at home. Tolerated oxycodone in the past with prior surgeries. Pt tolerating PO clears diet. Denies lethargy, chest pain, SOB, nausea, vomiting. Reports hx constipation, takes Linzess, mineral oil, herbs at home for constipation, last BM 11/16 AM. Passing flatus. Patient stated goal for pain control: to be able to take deep breaths, get out of bed to chair and ambulate with tolerable pain control. Pt ambulating with tolerable pain.     PAST MEDICAL & SURGICAL HISTORY:  Bipolar 1 disorder      Anxiety      Depression      GERD (gastroesophageal reflux disease)      Diverticulosis      History of migraine headaches      Constipation      S/P exploratory laparotomy  for endometriosis x 2      H/O arthroscopy  right knee 2011 and 2016      H/O arthroscopy  right hip 2017      H/O cervical spine surgery  C4- C5 (2020)      H/O carpal tunnel repair      History of carpal tunnel repair  left hand 11/2020 with trigger finger repair      H/O carpal tunnel repair  2021 with right thumb trigger finger repair          FAMILY HISTORY:  FH: colon cancer (Mother)        Social History:   [X ] Denies ETOH use, illicit drug use and smoking    Allergies    No Known Allergies    MEDICATIONS  (STANDING):  acetaminophen   IVPB .. 1000 milliGRAM(s) IV Intermittent every 6 hours  atorvastatin 40 milliGRAM(s) Oral at bedtime  cyanocobalamin 1000 MICROGram(s) Oral daily  escitalopram 10 milliGRAM(s) Oral daily  heparin   Injectable 5000 Unit(s) SubCutaneous every 8 hours  ketorolac   Injectable 15 milliGRAM(s) IV Push every 6 hours  morphine PF Spinal 0.3 milliGRAM(s) IntraThecal. once  potassium phosphate IVPB 15 milliMole(s) IV Intermittent once  pyridoxine 100 milliGRAM(s) Oral daily  sodium chloride 0.9% with potassium chloride 20 mEq/L 1000 milliLiter(s) (40 mL/Hr) IV Continuous <Continuous>    MEDICATIONS  (PRN):  clonazePAM  Tablet 0.5 milliGRAM(s) Oral three times a day PRN anxiety  naloxone Injectable 0.1 milliGRAM(s) IV Push every 3 minutes PRN For ANY of the following changes in patient status:  A. RR LESS THAN 10 breaths per minute, B. Oxygen saturation LESS THAN 90%, C. Sedation score of 6  ondansetron Injectable 4 milliGRAM(s) IV Push every 6 hours PRN Nausea  oxyCODONE    IR 5 milliGRAM(s) Oral every 4 hours PRN Severe Pain (7 - 10)  traZODone 50 milliGRAM(s) Oral at bedtime PRN insomnia      Vital Signs Last 24 Hrs  T(C): 36.7 (17 Nov 2022 05:13), Max: 36.8 (16 Nov 2022 12:35)  T(F): 98 (17 Nov 2022 05:13), Max: 98.2 (16 Nov 2022 12:35)  HR: 73 (17 Nov 2022 05:13) (56 - 80)  BP: 103/55 (17 Nov 2022 05:13) (82/49 - 106/57)  BP(mean): 67 (17 Nov 2022 05:13) (52 - 71)  RR: 18 (17 Nov 2022 05:13) (14 - 25)  SpO2: 99% (17 Nov 2022 05:13) (90% - 100%)    Parameters below as of 17 Nov 2022 05:13  Patient On (Oxygen Delivery Method): room air        LABS: Reviewed                          10.7   13.31 )-----------( 192      ( 17 Nov 2022 07:22 )             34.0     11-17    139  |  107  |  12  ----------------------------<  87  3.8   |  28  |  0.86    Ca    8.9      17 Nov 2022 07:22  Phos  2.3     11-17  Mg     1.9     11-17    Radiology: Reviewed.   < from: Xray Chest 2 Views PA/Lat (11.01.22 @ 14:51) >    ACC: [id number] EXAM:  XR CHEST PA LAT 2V                          PROCEDURE DATE:  11/01/2022          INTERPRETATION:  PA and lateral chest radiographs    COMPARISON:  NONE.    CLINICAL INFORMATION: Chronic tobacco use..    FINDINGS:    PULMONARY: The airway is midline.  There are no airspace consolidations.  No pleural effusion or pneumothorax.    HEART/VASCULAR: The heart size and mediastinum configuration are within   the limits of normal.    BONES: The visualized osseus structures are intact.    IMPRESSION:    No radiographic evidence of active chest disease..    --- End of Report ---            ARSALAN LEIVA MD; Attending Radiologist  This document has been electronically signed. Nov 1 2022  3:37PM    < end of copied text >      ORT Score -   Family Hx of substance abuse	Female	      Male  Alcohol 	                                           1                     3  Illegal drugs	                                   2                     3  Rx drugs                                           4 	                  4  Personal Hx of substance abuse		  Alcohol 	                                          3	                  3  Illegal drugs                                     4	                  4  Rx drugs                                            5 	                  5  Age between 16- 45 years	           1                     1  hx preadolescent sexual abuse	   3 	                  0  Psychological disease		  ADD, OCD, bipolar, schizophrenia   2	          2  Depression                                           1 	          1  Total: 3    a score of 3 or lower indicates low risk for opioid abuse		  a score of 4-7 indicates moderate risk for opioid abuse		  a score of 8 or higher indicates high risk for opioid abuse  	  REVIEW OF SYSTEMS:  CONSTITUTIONAL: No fever + fatigue  RESPIRATORY: No cough, wheezing, chills or hemoptysis; No shortness of breath  CARDIOVASCULAR: No chest pain, palpitations, dizziness, or leg swelling  GASTROINTESTINAL: No loss of appetite, decreased PO intake. No abdominal or epigastric pain. No nausea, vomiting; No diarrhea or constipation.   GENITOURINARY: No dysuria, frequency, hematuria, retention or incontinence  MUSCULOSKELETAL: + hx chronic hip and knee pain. No joint pain swelling; + b/l leg heaviness; No back pain. no upper or lower motor strength weakness, no saddle anesthesia, bowel/bladder incontinence, + hx falls   NEURO: No headaches, No numbness/tingling b/l LE, No weakness  PSYCHIATRIC: + bipolar 1, depression & anxiety     PHYSICAL EXAM:  GENERAL: + anxious, verbose; Alert & Oriented X4, cooperative, NAD, Good concentration. Speech is clear.   RESPIRATORY: Respirations even and unlabored. Clear to auscultation bilaterally; No rales, rhonchi, wheezing, or rubs; O2 3L NC   CARDIOVASCULAR: Normal S1/S2, regular rate and rhythm; No murmurs, rubs, or gallops. No JVD.   GASTROINTESTINAL:  Soft, + mildly tender, + steristrips c/d/i; Nondistended; Bowel sounds present  PERIPHERAL VASCULAR:  Extremities warm without edema. 2+ Peripheral Pulses, No cyanosis, No calf tenderness  MUSCULOSKELETAL: Motor Strength 5/5 B/L upper and lower extremities; moves all extremities equally against gravity; ROM intact; negative SLR; No tenderness on palpation of all joints.   SKIN: Warm, dry, intact. No rashes, lesions, scars or wounds.     Risk factors associated with adverse outcomes related to opioid treatment  [X ]  Concurrent benzodiazepine use  [ ]  History/ Active substance use or alcohol use disorder  [X ] Psychiatric co-morbidity  [ ] Sleep apnea  [ ] COPD  [ ] BMI> 35  [ ] Liver dysfunction  [ ] Renal dysfunction  [ ] CHF  [ ] Smoker  [ ]  Age > 60 years    [X ]  [place]  Reviewed and Copied to Chart. See below.    Plan of care and goal oriented pain management treatment options were discussed with patient and /or primary care giver; all questions and concerns were addressed and care was aligned with patient's wishes.    Educated patient on goal oriented pain management treatment options     11-17-22 @ 11:51    
INTERVAL HPI/OVERNIGHT EVENTS:  Pt resting comfortably. No acute complaints.   Tolerating diet.   +flatus/-BM.   Denies N/V    MEDICATIONS  (STANDING):  atorvastatin 40 milliGRAM(s) Oral at bedtime  cyanocobalamin 1000 MICROGram(s) Oral daily  escitalopram 10 milliGRAM(s) Oral daily  heparin   Injectable 5000 Unit(s) SubCutaneous every 8 hours  magnesium oxide 800 milliGRAM(s) Oral every 12 hours  morphine PF Spinal 0.3 milliGRAM(s) IntraThecal. once  pyridoxine 100 milliGRAM(s) Oral daily    MEDICATIONS  (PRN):  acetaminophen     Tablet .. 1000 milliGRAM(s) Oral every 8 hours PRN Moderate Pain (4 - 6)  clonazePAM  Tablet 0.5 milliGRAM(s) Oral three times a day PRN anxiety  naloxone Injectable 0.1 milliGRAM(s) IV Push every 3 minutes PRN For ANY of the following changes in patient status:  A. RR LESS THAN 10 breaths per minute, B. Oxygen saturation LESS THAN 90%, C. Sedation score of 6  ondansetron Injectable 4 milliGRAM(s) IV Push every 6 hours PRN Nausea  oxyCODONE    IR 5 milliGRAM(s) Oral every 4 hours PRN Severe Pain (7 - 10)  traZODone 50 milliGRAM(s) Oral at bedtime PRN insomnia      Vital Signs Last 24 Hrs  T(C): 36.8 (19 Nov 2022 05:56), Max: 36.8 (19 Nov 2022 05:56)  T(F): 98.3 (19 Nov 2022 05:56), Max: 98.3 (19 Nov 2022 05:56)  HR: 65 (19 Nov 2022 05:56) (65 - 73)  BP: 127/60 (19 Nov 2022 05:56) (127/60 - 153/81)  BP(mean): 76 (19 Nov 2022 05:56) (76 - 105)  RR: 18 (19 Nov 2022 05:56) (16 - 18)  SpO2: 92% (19 Nov 2022 05:56) (92% - 99%)    Parameters below as of 19 Nov 2022 05:56  Patient On (Oxygen Delivery Method): nasal cannula  O2 Flow (L/min): 2    Physical:  General: A&Ox3. NAD  Chest: respiration unlabored  Abdomen: Soft nondistended, appropriate incisional tenderness. No guarding. Dressing c/d/i    LABS:                        11.1   8.10  )-----------( 220      ( 19 Nov 2022 05:45 )             34.2             11-19    140  |  106  |  6<L>  ----------------------------<  107<H>  4.0   |  30  |  0.84    Ca    9.5      19 Nov 2022 05:45  Phos  2.2     11-18  Mg     2.2     11-18  
INTERVAL HPI/OVERNIGHT EVENTS:    No acute events overnight.   Pt resting comfortably. No acute complaints.   small flatus  bloated  denies n/v  +urinary hesistancy      MEDICATIONS  (STANDING):  atorvastatin 40 milliGRAM(s) Oral at bedtime  cyanocobalamin 1000 MICROGram(s) Oral daily  escitalopram 10 milliGRAM(s) Oral daily  heparin   Injectable 5000 Unit(s) SubCutaneous every 8 hours  morphine PF Spinal 0.3 milliGRAM(s) IntraThecal. once  pyridoxine 100 milliGRAM(s) Oral daily  sodium chloride 0.9% with potassium chloride 20 mEq/L 1000 milliLiter(s) (40 mL/Hr) IV Continuous <Continuous>    MEDICATIONS  (PRN):  acetaminophen     Tablet .. 1000 milliGRAM(s) Oral every 8 hours PRN Moderate Pain (4 - 6)  clonazePAM  Tablet 0.5 milliGRAM(s) Oral three times a day PRN anxiety  naloxone Injectable 0.1 milliGRAM(s) IV Push every 3 minutes PRN For ANY of the following changes in patient status:  A. RR LESS THAN 10 breaths per minute, B. Oxygen saturation LESS THAN 90%, C. Sedation score of 6  ondansetron Injectable 4 milliGRAM(s) IV Push every 6 hours PRN Nausea  oxyCODONE    IR 5 milliGRAM(s) Oral every 4 hours PRN Severe Pain (7 - 10)  traZODone 50 milliGRAM(s) Oral at bedtime PRN insomnia      Vital Signs Last 24 Hrs  T(C): 37.1 (18 Nov 2022 05:30), Max: 37.1 (18 Nov 2022 05:30)  T(F): 98.8 (18 Nov 2022 05:30), Max: 98.8 (18 Nov 2022 05:30)  HR: 64 (18 Nov 2022 05:30) (61 - 68)  BP: 139/67 (18 Nov 2022 05:30) (96/56 - 139/67)  BP(mean): --  RR: 18 (18 Nov 2022 05:30) (16 - 18)  SpO2: 98% (18 Nov 2022 05:30) (94% - 98%)    Parameters below as of 18 Nov 2022 05:30  Patient On (Oxygen Delivery Method): room air          PHYSICAL EXAM  General: Alert and oriented, not in acute distress  Resp: Breathing unlabored  Abdomen: Soft, nondistended, inc ttp  : No nava catheter, no dysuria or hematuria  Extremities: No pedal edema    I&O's Detail    17 Nov 2022 07:01  -  18 Nov 2022 07:00  --------------------------------------------------------  IN:  Total IN: 0 mL    OUT:    Indwelling Catheter - Urethral (mL): 1500 mL    Voided (mL): 200 mL  Total OUT: 1700 mL    Total NET: -1700 mL          LABS:                        10.3   8.20  )-----------( 194      ( 18 Nov 2022 07:45 )             32.6             11-18    143  |  111<H>  |  9   ----------------------------<  94  4.1   |  27  |  0.90    Ca    9.0      18 Nov 2022 07:45  Phos  2.2     11-18  Mg     2.2     11-18

## 2022-11-21 ENCOUNTER — NON-APPOINTMENT (OUTPATIENT)
Age: 62
End: 2022-11-21

## 2022-11-21 PROBLEM — K21.9 GASTRO-ESOPHAGEAL REFLUX DISEASE WITHOUT ESOPHAGITIS: Chronic | Status: ACTIVE | Noted: 2022-11-01

## 2022-11-21 PROBLEM — K57.90 DIVERTICULOSIS OF INTESTINE, PART UNSPECIFIED, WITHOUT PERFORATION OR ABSCESS WITHOUT BLEEDING: Chronic | Status: ACTIVE | Noted: 2022-11-01

## 2022-11-21 PROBLEM — K59.00 CONSTIPATION, UNSPECIFIED: Chronic | Status: ACTIVE | Noted: 2022-11-01

## 2022-11-21 PROBLEM — Z86.69 PERSONAL HISTORY OF OTHER DISEASES OF THE NERVOUS SYSTEM AND SENSE ORGANS: Chronic | Status: ACTIVE | Noted: 2022-11-01

## 2022-11-22 ENCOUNTER — NON-APPOINTMENT (OUTPATIENT)
Age: 62
End: 2022-11-22

## 2022-11-22 LAB — SURGICAL PATHOLOGY STUDY: SIGNIFICANT CHANGE UP

## 2022-11-29 ENCOUNTER — APPOINTMENT (OUTPATIENT)
Dept: SURGERY | Facility: CLINIC | Age: 62
End: 2022-11-29

## 2022-11-29 VITALS
SYSTOLIC BLOOD PRESSURE: 125 MMHG | HEIGHT: 63 IN | WEIGHT: 154 LBS | HEART RATE: 85 BPM | DIASTOLIC BLOOD PRESSURE: 82 MMHG | BODY MASS INDEX: 27.29 KG/M2 | TEMPERATURE: 97.4 F

## 2022-11-29 PROCEDURE — 99024 POSTOP FOLLOW-UP VISIT: CPT

## 2022-11-29 NOTE — HISTORY OF PRESENT ILLNESS
[FreeTextEntry1] : Ms. PHYLLIS MERRITT  is a 62 year F with a history of bipolar, chronic constipation, cervical pain, acid reflux, endometriosis, chronic migraines and rectal prolapse here for a post visit. She originally saw Dr. Sanchez who recommended a abdominal rectal prolapse repair but she was doubtful of that recommendation is here for a second opinion because she wanted to know if it would be safer going through the rectum due to her prior surgeries for endometriosis. She has a history of chronic constipation and is currently taking Linzess and motrgrity twice a week (can't do it more often because it interferes with work then). On the other days she has been relying more on Herbal tea and Mineral oil. She has daily normal to soft BM, with occasional straining. She has been having some issues with bloating as well. She has been Vegan for 5 years and has tried greek yogurt in the morning. She saw pelvic floor therapy last year and has been doing breathing and Kegel exercises since (sounds like maybe she was diagnosed w/ some form of obstructive defecation in the past. Last colonoscopy (2019) revealed diverticulosis and 1 polyp. Denies incontinence, fevers, chills or needing to manually assist w/ fecal evacuation. Since her last appointment she met with with the urogynecologist Dr. Parr who didn't note any vaginal prolapse. She has also undergone the MRI defecography on 9/19/2022 which revealed a small rectocele, rectal prolapse, mild vaginal prolapse and pelvic floor weakness. On 10/18/2022, patient came to review MRI defecography results. She is still having constipation, bloating which leads to vomiting. She also still has to strain to urinate. She is waking up 3-4 times at night with the urge to urinate. She has a follow up appointment with Dr. Granda and will discuss the reflux issues with him. Last EGD in 2021 revealed a hiatal hernia, otherwise normal. On 11/16/2022 she underwent a robotic anterior resection, flex sig and suture rectopexy. She had an uneventful post-op course and was discharged home on POD 3 having BMs, tolerating a diet. Today patient is doing well. She still has some bloating and though she is pooping more regularly still has to strain. She has been taking senna in the afternoon. She no longer notices a rectal prolapse either. Denies fevers/chills.

## 2022-11-29 NOTE — PHYSICAL EXAM
[Exam Deferred] : exam was deferred [Normal Rate and Rhythm] : normal rate and rhythm [Alert] : alert [Oriented to Person] : oriented to person [Oriented to Place] : oriented to place [Oriented to Time] : oriented to time [JVD] : no jugular venous distention  [de-identified] : soft, non-distended, non-tender to palpation, Incisions c/d/i w/o erythema or fluctuance and well healing. Steri strips in place.  [de-identified] : awake, alert, NAD  [de-identified] : normocephalic, atraumatic, EOMI, normal conjunctiva  [de-identified] : b/l chest rise, EWOB on RA  [de-identified] : deferred [de-identified] : Normal strength  [de-identified] : abd as above [de-identified] : normal mood and affect

## 2022-11-29 NOTE — ASSESSMENT
[FreeTextEntry1] : Ms. PHYLLIS MERRITT  is a 62 year F w/ a history of chronic constipation, rectal prolapse and pelvic floor dysfunction (weakness) s/p robotic anterior resection, flex sig and suture rectopexy on 11/16/2022 presenting for a post-op visit.

## 2022-11-29 NOTE — REVIEW OF SYSTEMS
[Abdominal Pain] : abdominal pain [Vomiting] : vomiting [Constipation] : constipation [Limb Pain] : limb pain [As Noted in HPI] : as noted in HPI [Negative] : Integumentary [Fever] : no fever [Chills] : no chills [Eye Pain] : no eye pain [Earache] : no earache [Chest Pain] : no chest pain [Shortness Of Breath] : no shortness of breath [FreeTextEntry7] : Bloating, nausea [FreeTextEntry8] : Difficulty urinating [FreeTextEntry9] : Neck pain, sees PT [de-identified] : Chronic migraines

## 2022-12-01 ENCOUNTER — NON-APPOINTMENT (OUTPATIENT)
Age: 62
End: 2022-12-01

## 2022-12-02 ENCOUNTER — APPOINTMENT (OUTPATIENT)
Age: 62
End: 2022-12-02

## 2022-12-02 VITALS
HEART RATE: 90 BPM | DIASTOLIC BLOOD PRESSURE: 82 MMHG | SYSTOLIC BLOOD PRESSURE: 128 MMHG | OXYGEN SATURATION: 98 % | HEIGHT: 63 IN | WEIGHT: 153 LBS | BODY MASS INDEX: 27.11 KG/M2 | TEMPERATURE: 97.9 F

## 2022-12-02 DIAGNOSIS — G43.719 CHRONIC MIGRAINE W/OUT AURA, INTRACTABLE, W/OUT STATUS MIGRAINOSUS: ICD-10-CM

## 2022-12-02 PROCEDURE — 99205 OFFICE O/P NEW HI 60 MIN: CPT

## 2022-12-02 RX ORDER — PROPRANOLOL HYDROCHLORIDE 60 MG/1
60 CAPSULE, EXTENDED RELEASE ORAL
Qty: 30 | Refills: 3 | Status: ACTIVE | COMMUNITY
Start: 2022-12-02 | End: 1900-01-01

## 2022-12-02 NOTE — HISTORY OF PRESENT ILLNESS
[FreeTextEntry1] : PHYLLIS MERRITT is a 62 year who presents with headaches\par \par Migraines since 2018. Incapacitating headaches.  Can be frontal, occipital, cervical. Throbbing pain.  +phonophobia, photophobia, occasional nausea without vomit. hasn't noticed autonomic symptoms with headaches.  no aura. Headaches avg 6 days weekly.\par \par Took topiramate for a couple of months and the paresthesias were not tolerable at 25mg BID.  Amitriptyline caused constipation.  Propranolol has not been tried for headaches.  Depakote caused cognitive problems. \par \par She is also getting botox every 3 months which has helped. CGRP injectables aimovig, emgality didn't help.\par \par Only take ubrelvy that helps.  Zolmitriptan when she runs out of ubrelvy doesn't work well. nurtec didn't help. qlipta didn't help\par \par On clonazepam for anxiety. Trazodone for sleep.  Escitalopram 5mg.  levo cetirazine as well.\par \par Sleeps with mouth open, not sure about snoring.  Sleeps 8 hours most nights and feels rested on awakening. Denies jaw clenching at night.  Jaw can pop her whole life.  Neck still frequently in pain. Gets intermittent trigger point injection. \par \par also lots of serious GI issues\par \par She does some deep breathing.  Has not tried pain oriented meditation.  Psychological symptoms better than in the past.

## 2022-12-02 NOTE — PHYSICAL EXAM
[FreeTextEntry1] : General: this is a pleasant patient in no acute distress\par \par HEENT conjunctiva are normal, oropharynx is clear, no tenderness in head\par \par CV: normal pulses, regular rate and rhythm, no peripheral edema noted\par \par Lungs: breathing is non-labored\par \par abd: soft and non-distended\par \par MSK:\par SLR: \par MARKELL:\par range of motion:\par tinnels: \par spurling:\par Occipital nerve tenderness:\par \par Mental status:\par Alert and oriented to person, place and time\par Memory: grossly nl\par Attention: grossly nl\par Language is normal \par \par Cranial Nerves:\par extra-occular movements in tact without nystagmus, normal saccades and smooth pursuit, visual fields full to confrontation, pupils equal, round and reactive to light. Face symmetric and facial strength symmetric, facial sensation symmetric, hearing present bilaterally to finger rub, tongue and uvula midline. neck flexion and extension normal strength.\par \par Motor: normal bulk and tone throughout. no abnormal movements.  Full 5/5 strength uppers and lower extremities proximally and distally\par \par Sensory: in tact and symmetric to vibration, light tough, pin prick, temperature\par \par Cerebellar: normal finger-nose-finger bilaterally\par \par Reflexes: 2+ in the upper and lower extremities and symmetric.  toes are bilaterally downgoing.\par \par Gait: stable, able to tip toe heel and tandem\par \par Stances:\par Romberg: \par Shapened romberg:\par

## 2022-12-02 NOTE — CONSULT LETTER
[Dear  ___] : Dear  [unfilled], [Courtesy Letter:] : I had the pleasure of seeing your patient, [unfilled], in my office today. [Please see my note below.] : Please see my note below. [Consult Closing:] : Thank you very much for allowing me to participate in the care of this patient.  If you have any questions, please do not hesitate to contact me. [Sincerely,] : Sincerely, [FreeTextEntry3] : Noel Ojeda MD

## 2022-12-12 ENCOUNTER — NON-APPOINTMENT (OUTPATIENT)
Age: 62
End: 2022-12-12

## 2022-12-20 ENCOUNTER — APPOINTMENT (OUTPATIENT)
Dept: GASTROENTEROLOGY | Facility: CLINIC | Age: 62
End: 2022-12-20
Payer: MEDICARE

## 2022-12-20 ENCOUNTER — APPOINTMENT (OUTPATIENT)
Dept: SURGERY | Facility: CLINIC | Age: 62
End: 2022-12-20

## 2022-12-20 VITALS
BODY MASS INDEX: 27.82 KG/M2 | DIASTOLIC BLOOD PRESSURE: 79 MMHG | WEIGHT: 157 LBS | HEART RATE: 92 BPM | HEIGHT: 63 IN | SYSTOLIC BLOOD PRESSURE: 132 MMHG

## 2022-12-20 VITALS
TEMPERATURE: 97.2 F | WEIGHT: 154 LBS | OXYGEN SATURATION: 96 % | DIASTOLIC BLOOD PRESSURE: 67 MMHG | HEIGHT: 63 IN | HEART RATE: 102 BPM | BODY MASS INDEX: 27.29 KG/M2 | SYSTOLIC BLOOD PRESSURE: 109 MMHG

## 2022-12-20 PROCEDURE — 99024 POSTOP FOLLOW-UP VISIT: CPT

## 2022-12-20 PROCEDURE — 99213 OFFICE O/P EST LOW 20 MIN: CPT

## 2022-12-20 RX ORDER — LINACLOTIDE 145 UG/1
145 CAPSULE, GELATIN COATED ORAL
Qty: 30 | Refills: 3 | Status: COMPLETED | COMMUNITY
Start: 2022-03-08 | End: 2022-12-20

## 2022-12-20 RX ORDER — PANTOPRAZOLE 20 MG/1
20 TABLET, DELAYED RELEASE ORAL DAILY
Qty: 30 | Refills: 2 | Status: COMPLETED | COMMUNITY
Start: 2022-05-31 | End: 2022-12-20

## 2022-12-20 RX ORDER — PRUCALOPRIDE 2 MG/1
2 TABLET, FILM COATED ORAL
Qty: 30 | Refills: 2 | Status: COMPLETED | COMMUNITY
Start: 2022-02-17 | End: 2022-12-20

## 2022-12-20 NOTE — PHYSICAL EXAM

## 2022-12-27 NOTE — PHYSICAL EXAM
[Exam Deferred] : exam was deferred [Normal Rate and Rhythm] : normal rate and rhythm [Alert] : alert [Oriented to Person] : oriented to person [Oriented to Place] : oriented to place [Oriented to Time] : oriented to time [JVD] : no jugular venous distention  [de-identified] : soft, non-distended, non-tender to palpation. Incisions c/d/i well healed.  [de-identified] : awake, alert, NAD  [de-identified] : normocephalic, atraumatic, EOMI, normal conjunctiva  [de-identified] : b/l chest rise, EWOB on RA  [de-identified] : deferred [de-identified] : Normal strength  [de-identified] : abd as above [de-identified] : normal mood and affect

## 2022-12-27 NOTE — REVIEW OF SYSTEMS
[Abdominal Pain] : abdominal pain [Limb Pain] : limb pain [As Noted in HPI] : as noted in HPI [Negative] : Integumentary [Recent Weight Gain (___ Lbs)] : recent [unfilled] ~Ulb weight gain [Vomiting] : vomiting [Fever] : no fever [Chills] : no chills [Eye Pain] : no eye pain [Earache] : no earache [Chest Pain] : no chest pain [Shortness Of Breath] : no shortness of breath [FreeTextEntry7] : Bloating, nausea [FreeTextEntry8] : Difficulty urinating [FreeTextEntry9] : Neck pain, sees PT [de-identified] : Chronic migraines

## 2022-12-27 NOTE — ASSESSMENT
[FreeTextEntry1] : Ms. PHYLLIS MERRITT  is a 62 year F w/ a history of chronic constipation, rectal prolapse and pelvic floor dysfunction (weakness) s/p robotic anterior resection, flex sig and suture rectopexy on 11/16/2022 presenting for a follow up visit.

## 2022-12-27 NOTE — HISTORY OF PRESENT ILLNESS
[FreeTextEntry1] : Ms. PHYLLIS MERRITT  is a 62 year F with a history of bipolar, chronic constipation, cervical pain, acid reflux, endometriosis, chronic migraines and rectal prolapse here a follow up visit. She originally saw Dr. Sanchez who recommended a abdominal rectal prolapse repair but she was doubtful of that recommendation is here for a second opinion because she wanted to know if it would be safer going through the rectum due to her prior surgeries for endometriosis. She has a history of chronic constipation and is currently taking Linzess and motrgrity twice a week (can't do it more often because it interferes with work then). On the other days she has been relying more on Herbal tea and Mineral oil. She has daily normal to soft BM, with occasional straining. She has been having some issues with bloating as well. She has been Vegan for 5 years and has tried greek yogurt in the morning. She saw pelvic floor therapy last year and has been doing breathing and Kegel exercises since (sounds like maybe she was diagnosed w/ some form of obstructive defecation in the past. Last colonoscopy (2019) revealed diverticulosis and 1 polyp. Denies incontinence, fevers, chills or needing to manually assist w/ fecal evacuation. Since her last appointment she met with with the urogynecologist Dr. Parr who didn't note any vaginal prolapse. She has also undergone the MRI defecography on 9/19/2022 which revealed a small rectocele, rectal prolapse, mild vaginal prolapse and pelvic floor weakness. On 10/18/2022, patient came to review MRI defecography results. She is still having constipation, bloating which leads to vomiting. She also still has to strain to urinate. She is waking up 3-4 times at night with the urge to urinate. She has a follow up appointment with Dr. Granda and will discuss the reflux issues with him. Last EGD in 2021 revealed a hiatal hernia, otherwise normal. On 11/16/2022 she underwent a robotic anterior resection, flex sig and suture rectopexy. She had an uneventful post-op course and was discharged home on POD 3 having BMs, tolerating a diet. Today patient is still having intermittent B/L lower abdominal pain similar to endometriosis pain. She is still on a LFD as fiber causes bloating and nausea. She is no longer taking Linzess or Motegrity and is able to have BMs regularly. She initially tried dulcolax which led to diarrhea and anal bleeding. She then tried MiraLAX and senna which did not help with constipation. She saw Dr. Granda who recommended MiraLAX BID, colace and Benefiber. She still has no recurrent rectal prolapse. She recently signed up to see a nutritionist in January.

## 2023-01-06 NOTE — ASSESSMENT
[FreeTextEntry1] : This is a 62 year old female here for a follow up constipation. She recently under went robotic anterior resection, flex sig suture rectopexy with Dr. Rubio.\par \par My plan \par Mirlax BID- Decrease to daily if diarrhea \par Colace TID\par Fiber daily \par

## 2023-01-06 NOTE — HISTORY OF PRESENT ILLNESS
[FreeTextEntry1] : This is a 62 year old female here for a follow up constipation. She recently under went robotic anterior resection, flex sig suture rectopexy with Dr. Rubio. Since surgery she has noticed worsen constipation. She is currently on MiraLAX and Senna daily. She is having BMs every other day. She is not satisfied. Has some bloatting. No blood or dark tarry stools. No rectal pain. Tolerating meals.

## 2023-01-10 ENCOUNTER — APPOINTMENT (OUTPATIENT)
Dept: SURGERY | Facility: CLINIC | Age: 63
End: 2023-01-10

## 2023-01-23 ENCOUNTER — APPOINTMENT (OUTPATIENT)
Dept: OPHTHALMOLOGY | Facility: CLINIC | Age: 63
End: 2023-01-23

## 2023-01-28 ENCOUNTER — EMERGENCY (EMERGENCY)
Facility: HOSPITAL | Age: 63
LOS: 1 days | Discharge: HOME | End: 2023-01-28
Payer: COMMERCIAL

## 2023-01-28 VITALS
WEIGHT: 149.91 LBS | OXYGEN SATURATION: 96 % | TEMPERATURE: 98 F | HEART RATE: 98 BPM | SYSTOLIC BLOOD PRESSURE: 116 MMHG | RESPIRATION RATE: 16 BRPM | DIASTOLIC BLOOD PRESSURE: 77 MMHG | HEIGHT: 63 IN

## 2023-01-28 DIAGNOSIS — Z98.890 OTHER SPECIFIED POSTPROCEDURAL STATES: Chronic | ICD-10-CM

## 2023-01-28 DIAGNOSIS — Z98.89 OTHER SPECIFIED POSTPROCEDURAL STATES: Chronic | ICD-10-CM

## 2023-01-28 PROCEDURE — 73630 X-RAY EXAM OF FOOT: CPT

## 2023-01-28 PROCEDURE — 73630 X-RAY EXAM OF FOOT: CPT | Mod: 26,LT

## 2023-01-28 PROCEDURE — 99283 EMERGENCY DEPT VISIT LOW MDM: CPT

## 2023-01-28 PROCEDURE — 99284 EMERGENCY DEPT VISIT MOD MDM: CPT

## 2023-01-28 RX ORDER — IBUPROFEN 200 MG
600 TABLET ORAL ONCE
Refills: 0 | Status: COMPLETED | OUTPATIENT
Start: 2023-01-28 | End: 2023-01-28

## 2023-01-28 RX ADMIN — Medication 600 MILLIGRAM(S): at 15:08

## 2023-01-28 NOTE — ED PROVIDER NOTE - PATIENT PORTAL LINK FT
You can access the FollowMyHealth Patient Portal offered by [facility] by registering at the following website: [email or web address]. By joining [facility]’s FollowMyHealth portal, you will also be able to view your health information using other applications (apps) compatible with our system.

## 2023-01-28 NOTE — ED PROVIDER NOTE - NSFOLLOWUPCLINICS_GEN_ALL_ED_FT
[facility]  Podiatry/Wound Care  [address] [place] [address]  Phone: [phone]  Fax: [phone]

## 2023-01-28 NOTE — ED PROVIDER NOTE - PROGRESS NOTE DETAILS
fracture noted on wet read to middle phalange of 5th toe.  Toes wing taped, post op shoe applied.  No neurovascular deficits.  Patient to f/u with podiatry.  Patient nontoxic and medically stable for discharge. Results (wet read) discussed with patient. Return precautions provided and patient understands to return to the ED for concerning or worsening signs and symptoms. Patient's questions answered.

## 2023-01-28 NOTE — ED PROVIDER NOTE - OBJECTIVE STATEMENT
62 year old female with no pertinent past medical history presents to the ED with complaints of left 5th toe injury. The patient states that she stubbed her toe around midnight and afterwards developed severe pain and has been unable to walk on the foot. The patient denies any other injuries, use of medications for pain today, and any other symptoms. NKDA.

## 2023-01-28 NOTE — ED PROVIDER NOTE - CLINICAL SUMMARY MEDICAL DECISION MAKING FREE TEXT BOX
62 year old female with no pertinent past medical history presents to the ED with complaints of left 5th toe injury. Will obtain x-ray to asses for fracture, give the patient Ibuprofen for pain, and reassess.

## 2023-01-28 NOTE — ED ADULT TRIAGE NOTE - BP NONINVASIVE DIASTOLIC (MM HG)
Chief Complaint   Patient presents with    Cerebrovascular Accident     has some residual effects to LUE    Other     has some LUE non use of hand r/t CVA     1. Have you been to the ER, urgent care clinic since your last visit? Hospitalized since your last visit? No    2. Have you seen or consulted any other health care providers outside of the [address] [place] [address] since your last visit? Include any pap smears or colon screening.  No 79

## 2023-01-28 NOTE — ED ADULT NURSE NOTE - NSIMPLEMENTINTERV_GEN_ALL_ED
Implemented All Universal Safety Interventions:  [place] to call system. Call bell, personal items and telephone within reach. Instruct patient to call for assistance. Room bathroom lighting operational. Non-slip footwear when patient is off stretcher. Physically safe environment: no spills, clutter or unnecessary equipment. Stretcher in lowest position, wheels locked, appropriate side rails in place.

## 2023-01-28 NOTE — ED PROVIDER NOTE - NSDCPRINTRESULTS_ED_ALL_ED
Dr Poe    See message  They were not happy with [employer]  Should care coordination referral be ordered  Referral cued    Marilyn Johnston RN   [facility]         Patient requests all Lab, Cardiology, and Radiology Results on their Discharge Instructions

## 2023-01-28 NOTE — ED PROVIDER NOTE - PHYSICAL EXAMINATION
Left 5th toe with ecchymosis to the medial proximal aspect with tenderness to palpation. Able to wiggle toe. No sensory deficits. Distal toe with normal temperature gradient. No tenderness to the rest of foot.

## 2023-01-31 ENCOUNTER — OUTPATIENT (OUTPATIENT)
Dept: OUTPATIENT SERVICES | Facility: HOSPITAL | Age: 63
LOS: 1 days | End: 2023-01-31
Payer: COMMERCIAL

## 2023-01-31 ENCOUNTER — APPOINTMENT (OUTPATIENT)
Dept: PODIATRY | Facility: CLINIC | Age: 63
End: 2023-01-31

## 2023-01-31 VITALS
RESPIRATION RATE: 16 BRPM | WEIGHT: 151 LBS | HEART RATE: 68 BPM | BODY MASS INDEX: 26.75 KG/M2 | TEMPERATURE: 97.2 F | OXYGEN SATURATION: 96 % | SYSTOLIC BLOOD PRESSURE: 130 MMHG | DIASTOLIC BLOOD PRESSURE: 78 MMHG | HEIGHT: 63 IN

## 2023-01-31 DIAGNOSIS — Z98.890 OTHER SPECIFIED POSTPROCEDURAL STATES: Chronic | ICD-10-CM

## 2023-01-31 DIAGNOSIS — S92.902A UNSPECIFIED FRACTURE OF LEFT FOOT, INITIAL ENCOUNTER FOR CLOSED FRACTURE: ICD-10-CM

## 2023-01-31 DIAGNOSIS — Z98.89 OTHER SPECIFIED POSTPROCEDURAL STATES: Chronic | ICD-10-CM

## 2023-01-31 DIAGNOSIS — Z00.00 ENCOUNTER FOR GENERAL ADULT MEDICAL EXAMINATION WITHOUT ABNORMAL FINDINGS: ICD-10-CM

## 2023-01-31 PROCEDURE — G0463: CPT

## 2023-02-01 DIAGNOSIS — S92.352A DISPLACED FRACTURE OF FIFTH METATARSAL BONE, LEFT FOOT, INITIAL ENCOUNTER FOR CLOSED FRACTURE: ICD-10-CM

## 2023-02-07 PROBLEM — S92.902A FOOT FRACTURE, LEFT: Status: ACTIVE | Noted: 2023-02-07

## 2023-02-09 NOTE — H&P PST ADULT - FALL HARM RISK - PT AGE POPULATION HIDDEN
RN NOTE



INFORMED NP HERMINIO OF CURRENT BP /30. NO NEW ORDERS AT THIS TIME. CHARGE NURSE JORDEN 
AWARE. Adult

## 2023-02-14 NOTE — ASSESSMENT
[FreeTextEntry1] : Derm: ecchymosis noted to left 5th digit dorsally. no open wounds or lesions\par vasc: no edema noted, DP PT palpable b/l. skin tg warm to warm proximal to distal\par neuro: protective and epicritic sensation intact\par msk: no POP to left 5th digit. no gross deformities. \par \par There is a nondisplaced fracture involving the distal aspect of the \par proximal phalanx of the fifth toe with soft tissue swelling.\par There are no other fractures or dislocation\par Large plantar spur.\par No radiopaque foreign body.\par \par IMPRESSION:\par 1.  There is a nondisplaced fracture involving the distal aspect of the \par proximal phalanx of the fifth toe with soft tissue swelling.\par \par A: \par left 5th proximal phalanx fracture - nondisplaced \par \par \par P:\par patient seen and evaluated \par reviewed xrays \par buddy taped 4-5 digit \par patient may wbat in supportive sneakers, may c/ surgical shoe as needed \par patient may return to work in 2 weeks \par dispensed work letter \par explained to patient taked about 6 weeks for bones to heal however her fracture is nondisplaced and she may walk with supportive sneakers\par refrain from high impact excercises, FF loading lifting and running until fracture fully healed \par rx new xrays \par rtc 1 month with new xrays \par \par written by Negro Jamison PGY3

## 2023-02-14 NOTE — HISTORY OF PRESENT ILLNESS
[FreeTextEntry1] : HPI: Patient is here for a first time visit after stubbing her 5th digit against the bed frame. She went to  hospital on 1/28/23 and received xrays. she was told she fractured her 5th toe and was given a surgical shoe. she states she has no pain today compared to 7/10 pain on the day of injury. she states she works on her feet all day and wants to know how long she will be out of work. she denies consitutional symptoms. \par \par

## 2023-02-16 ENCOUNTER — APPOINTMENT (OUTPATIENT)
Dept: PODIATRY | Facility: CLINIC | Age: 63
End: 2023-02-16

## 2023-02-16 ENCOUNTER — RESULT REVIEW (OUTPATIENT)
Age: 63
End: 2023-02-16

## 2023-02-16 ENCOUNTER — OUTPATIENT (OUTPATIENT)
Dept: OUTPATIENT SERVICES | Facility: HOSPITAL | Age: 63
LOS: 1 days | End: 2023-02-16
Payer: COMMERCIAL

## 2023-02-16 VITALS
BODY MASS INDEX: 26.75 KG/M2 | HEIGHT: 63 IN | HEART RATE: 63 BPM | WEIGHT: 151 LBS | TEMPERATURE: 97.5 F | OXYGEN SATURATION: 96 % | SYSTOLIC BLOOD PRESSURE: 115 MMHG | DIASTOLIC BLOOD PRESSURE: 73 MMHG

## 2023-02-16 DIAGNOSIS — Z98.890 OTHER SPECIFIED POSTPROCEDURAL STATES: Chronic | ICD-10-CM

## 2023-02-16 DIAGNOSIS — Z98.89 OTHER SPECIFIED POSTPROCEDURAL STATES: Chronic | ICD-10-CM

## 2023-02-16 DIAGNOSIS — Z00.00 ENCOUNTER FOR GENERAL ADULT MEDICAL EXAMINATION WITHOUT ABNORMAL FINDINGS: ICD-10-CM

## 2023-02-16 PROCEDURE — G0463: CPT

## 2023-02-21 NOTE — HISTORY OF PRESENT ILLNESS
[FreeTextEntry1] : HPI: Patient is here for a follow-up visit. Patient ambulates in wearing a surgical shoe. She is s/p a 5th toe fracture on 1/28/23. Patient says she is very uncomfortable and recently tripped and fell in the surgical shoe. Patient reports no pain. Would like to transition into a regular shoe. Would like to return to work. She works as an .\par \par Patient was here on her last visit for the first time visit after stubbing her 5th digit against the bed frame. She went to [facility] on 1/28/23 and received xrays. she was told she fractured her 5th toe and was given a surgical shoe. she states she has no pain today compared to 7/10 pain on the day of injury.\par \par

## 2023-02-21 NOTE — ASSESSMENT
[FreeTextEntry1] : Derm: no open lesions or ecchymosis noted to left foot\par vasc: no edema noted, DP PT palpable b/l. skin tg warm to warm proximal to distal\par neuro: protective and epicritic sensation intact\par msk: no POP to left 5th digit. no gross deformities. \par \par There is a nondisplaced fracture involving the distal aspect of the \par proximal phalanx of the fifth toe with soft tissue swelling.\par There are no other fractures or dislocation\par Large plantar spur.\par No radiopaque foreign body.\par \par IMPRESSION:\par 1.  There is a nondisplaced fracture involving the distal aspect of the \par proximal phalanx of the fifth toe with soft tissue swelling.\par \par A: \par left 5th proximal phalanx fracture - nondisplaced \par \par \par P:\par patient seen and evaluated \par reviewed xrays \par Dispensed a smaller surgical shoe\par Advised patient a very slow transition to a regular sneaker that is supportive\par Recommended PowerStep insoles\par Patient may return to work\par Explained to patient talked about 6 weeks for bones to heall\par Refrain from high impact excercises, FF loading lifting and running until fracture fully healed \par rx new xrays \par rtc 1 month with new xrays \par \par written by resident Evelyn Gibson PGY1

## 2023-02-23 DIAGNOSIS — S92.355K: ICD-10-CM

## 2023-02-27 ENCOUNTER — OUTPATIENT (OUTPATIENT)
Dept: OUTPATIENT SERVICES | Facility: HOSPITAL | Age: 63
LOS: 1 days | End: 2023-02-27
Payer: COMMERCIAL

## 2023-02-27 DIAGNOSIS — Z98.890 OTHER SPECIFIED POSTPROCEDURAL STATES: Chronic | ICD-10-CM

## 2023-02-27 DIAGNOSIS — S92.902A UNSPECIFIED FRACTURE OF LEFT FOOT, INITIAL ENCOUNTER FOR CLOSED FRACTURE: ICD-10-CM

## 2023-02-27 DIAGNOSIS — Z98.89 OTHER SPECIFIED POSTPROCEDURAL STATES: Chronic | ICD-10-CM

## 2023-02-27 PROCEDURE — 73630 X-RAY EXAM OF FOOT: CPT

## 2023-02-27 PROCEDURE — 73630 X-RAY EXAM OF FOOT: CPT | Mod: 26,LT

## 2023-03-01 ENCOUNTER — RESULT REVIEW (OUTPATIENT)
Age: 63
End: 2023-03-01

## 2023-03-01 ENCOUNTER — APPOINTMENT (OUTPATIENT)
Dept: PODIATRY | Facility: CLINIC | Age: 63
End: 2023-03-01

## 2023-03-01 ENCOUNTER — OUTPATIENT (OUTPATIENT)
Dept: OUTPATIENT SERVICES | Facility: HOSPITAL | Age: 63
LOS: 1 days | End: 2023-03-01
Payer: COMMERCIAL

## 2023-03-01 VITALS
HEIGHT: 63 IN | TEMPERATURE: 97.5 F | HEART RATE: 64 BPM | WEIGHT: 151 LBS | OXYGEN SATURATION: 95 % | SYSTOLIC BLOOD PRESSURE: 133 MMHG | DIASTOLIC BLOOD PRESSURE: 83 MMHG | BODY MASS INDEX: 26.75 KG/M2

## 2023-03-01 DIAGNOSIS — Z98.890 OTHER SPECIFIED POSTPROCEDURAL STATES: Chronic | ICD-10-CM

## 2023-03-01 DIAGNOSIS — S92.515D NONDISPLACED FRACTURE OF PROXIMAL PHALANX OF LEFT LESSER TOE(S), SUBSEQUENT ENCOUNTER FOR FRACTURE WITH ROUTINE HEALING: ICD-10-CM

## 2023-03-01 DIAGNOSIS — Z00.00 ENCOUNTER FOR GENERAL ADULT MEDICAL EXAMINATION WITHOUT ABNORMAL FINDINGS: ICD-10-CM

## 2023-03-01 DIAGNOSIS — M79.672 PAIN IN LEFT FOOT: ICD-10-CM

## 2023-03-01 DIAGNOSIS — Z98.89 OTHER SPECIFIED POSTPROCEDURAL STATES: Chronic | ICD-10-CM

## 2023-03-01 PROCEDURE — G0463: CPT

## 2023-03-01 NOTE — ASSESSMENT
[FreeTextEntry1] : Derm: no open lesions or ecchymosis noted to left foot\par vasc: no edema noted, DP PT palpable b/l. skin tg warm to warm proximal to distal\par neuro: protective and epicritic sensation intact\par msk: mild POP to left 5th digit. no gross deformities. Pain to 5th digit on weight bearing\par \par There is a nondisplaced fracture involving the distal aspect of the \par proximal phalanx of the fifth toe with soft tissue swelling.\par There are no other fractures or dislocation\par Large plantar spur.\par No radiopaque foreign body.\par \par IMPRESSION:\par 1.  There is a nondisplaced fracture involving the distal aspect of the \par proximal phalanx of the fifth toe with soft tissue swelling.\par \par Comparison: 1/28/2022 LEFT foot radiographs\par FINDINGS:\par \par Healing oblique fracture of the distal end of fifth proximal phalanx.\par Remaining visualized osseous and joint structures of the LEFT foot are radiographically intact.\par \par Soft tissues unremarkable.\par \par IMPRESSION:\par Healing fifth proximal phalanx fracture. No acute radiographic osseous pathology.\par \par A: \par left 5th proximal phalanx fracture - nondisplaced \par \par \par P:\par patient seen and evaluated \par reviewed xrays from 1/27/23\par Limited weight bearing for activities of daily living in a regular sneaker\par Refrain from high impact exercises, FF loading lifting and running until fracture fully healed \par rx new xrays \par discussed healing timelines\par rtc 1 month with new xrays \par \par Written by resident Evelyn Gibson PGY1

## 2023-03-01 NOTE — HISTORY OF PRESENT ILLNESS
[FreeTextEntry1] : HPI: Patient returns to clinic. Patient ambulates in wearing a surgical shoe. She is s/p a 5th toe fracture on 1/28/23. Patient transitioned to a regular sneaker and was pain free. However, she stubbed her toe a week ago, on 2/24/23 and it started to be painful again. Patient had new xrays taken of her left foot on 2/27/23, would like to review the results and see if she made her fracture worse. She does not have any other pedal complaints.\par \par On initial visit, patient presented to clinic for 5th digit injury against the bed frame. She went to [facility] on 1/28/23 and received xrays. she was told she fractured her 5th toe and was given a surgical shoe. she states she has no pain today compared to 7/10 pain on the day of injury.\par \par

## 2023-03-21 ENCOUNTER — OUTPATIENT (OUTPATIENT)
Dept: OUTPATIENT SERVICES | Facility: HOSPITAL | Age: 63
LOS: 1 days | End: 2023-03-21
Payer: COMMERCIAL

## 2023-03-21 DIAGNOSIS — Z98.890 OTHER SPECIFIED POSTPROCEDURAL STATES: Chronic | ICD-10-CM

## 2023-03-21 DIAGNOSIS — Z98.89 OTHER SPECIFIED POSTPROCEDURAL STATES: Chronic | ICD-10-CM

## 2023-03-21 DIAGNOSIS — S92.902A UNSPECIFIED FRACTURE OF LEFT FOOT, INITIAL ENCOUNTER FOR CLOSED FRACTURE: ICD-10-CM

## 2023-03-21 PROCEDURE — 73630 X-RAY EXAM OF FOOT: CPT | Mod: 26,LT

## 2023-03-21 PROCEDURE — 73630 X-RAY EXAM OF FOOT: CPT

## 2023-03-22 ENCOUNTER — OUTPATIENT (OUTPATIENT)
Dept: OUTPATIENT SERVICES | Facility: HOSPITAL | Age: 63
LOS: 1 days | End: 2023-03-22
Payer: COMMERCIAL

## 2023-03-22 ENCOUNTER — APPOINTMENT (OUTPATIENT)
Dept: PODIATRY | Facility: CLINIC | Age: 63
End: 2023-03-22

## 2023-03-22 ENCOUNTER — RESULT REVIEW (OUTPATIENT)
Age: 63
End: 2023-03-22

## 2023-03-22 VITALS
TEMPERATURE: 97.4 F | DIASTOLIC BLOOD PRESSURE: 76 MMHG | BODY MASS INDEX: 25.87 KG/M2 | WEIGHT: 146 LBS | HEIGHT: 63 IN | RESPIRATION RATE: 18 BRPM | HEART RATE: 77 BPM | SYSTOLIC BLOOD PRESSURE: 121 MMHG | OXYGEN SATURATION: 96 %

## 2023-03-22 DIAGNOSIS — Z98.890 OTHER SPECIFIED POSTPROCEDURAL STATES: Chronic | ICD-10-CM

## 2023-03-22 DIAGNOSIS — Z00.00 ENCOUNTER FOR GENERAL ADULT MEDICAL EXAMINATION WITHOUT ABNORMAL FINDINGS: ICD-10-CM

## 2023-03-22 DIAGNOSIS — Z98.89 OTHER SPECIFIED POSTPROCEDURAL STATES: Chronic | ICD-10-CM

## 2023-03-22 PROCEDURE — G0463: CPT

## 2023-03-22 NOTE — ASSESSMENT
[FreeTextEntry1] : Derm: no open lesions or ecchymosis noted to left foot\par vasc: no edema noted, DP PT palpable b/l. skin tg warm to warm proximal to distal\par neuro: protective and epicritic sensation intact\par msk: no POP to left 5th digit. no gross deformities. minimal pain to 5th digit on weight bearing\par \par There is a nondisplaced fracture involving the distal aspect of the \par proximal phalanx of the fifth toe with soft tissue swelling.\par There are no other fractures or dislocation\par Large plantar spur.\par No radiopaque foreign body.\par \par A: \par left foot pain\par left 5th proximal phalanx fracture - nondisplaced \par \par \par P:\par patient seen and evaluated \par reviewed xrays from 3/21/23\par Limited weight bearing for activities of daily living in a regular sneaker - okay to cycle\par Refrain from high impact exercises, FF loading lifting and running until fracture fully healed \par rx new xrays \par rtc 1 month with new xrays \par \par Written by resident Negro aJmison PGY3

## 2023-03-22 NOTE — HISTORY OF PRESENT ILLNESS
[FreeTextEntry1] : HPI: Patient returns to clinic. Patient ambulates in wearing a surgical shoe. She is s/p a 5th toe fracture on 1/28/23. Patient transitioned to a regular sneaker and was pain free. However, she stubbed her toe a week ago, on 2/24/23 and it started to be painful again. today, patient is back with serial xrays. she is still wearing sneakers and is back to work. states she has no pain and asks if she can go back to exercising.  She does not have any other pedal complaints.\par \par On initial visit, patient presented to clinic for 5th digit injury against the bed frame. She went to [facility] on 1/28/23 and received xrays. she was told she fractured her 5th toe and was given a surgical shoe. she states she has no pain today compared to 7/10 pain on the day of injury.\par \par

## 2023-03-23 DIAGNOSIS — S92.515D NONDISPLACED FRACTURE OF PROXIMAL PHALANX OF LEFT LESSER TOE(S), SUBSEQUENT ENCOUNTER FOR FRACTURE WITH ROUTINE HEALING: ICD-10-CM

## 2023-03-23 DIAGNOSIS — M79.672 PAIN IN LEFT FOOT: ICD-10-CM

## 2023-04-03 ENCOUNTER — NON-APPOINTMENT (OUTPATIENT)
Age: 63
End: 2023-04-03

## 2023-04-05 ENCOUNTER — NON-APPOINTMENT (OUTPATIENT)
Age: 63
End: 2023-04-05

## 2023-04-28 ENCOUNTER — OUTPATIENT (OUTPATIENT)
Dept: OUTPATIENT SERVICES | Facility: HOSPITAL | Age: 63
LOS: 1 days | End: 2023-04-28
Payer: COMMERCIAL

## 2023-04-28 DIAGNOSIS — Z98.890 OTHER SPECIFIED POSTPROCEDURAL STATES: Chronic | ICD-10-CM

## 2023-04-28 DIAGNOSIS — S92.902A UNSPECIFIED FRACTURE OF LEFT FOOT, INITIAL ENCOUNTER FOR CLOSED FRACTURE: ICD-10-CM

## 2023-04-28 DIAGNOSIS — Z98.89 OTHER SPECIFIED POSTPROCEDURAL STATES: Chronic | ICD-10-CM

## 2023-04-28 PROCEDURE — 73630 X-RAY EXAM OF FOOT: CPT

## 2023-04-28 PROCEDURE — 73630 X-RAY EXAM OF FOOT: CPT | Mod: 26,LT

## 2023-05-10 ENCOUNTER — APPOINTMENT (OUTPATIENT)
Dept: UROGYNECOLOGY | Facility: CLINIC | Age: 63
End: 2023-05-10

## 2023-05-23 ENCOUNTER — APPOINTMENT (OUTPATIENT)
Dept: UROGYNECOLOGY | Facility: CLINIC | Age: 63
End: 2023-05-23
Payer: MEDICARE

## 2023-05-23 ENCOUNTER — APPOINTMENT (OUTPATIENT)
Dept: GASTROENTEROLOGY | Facility: CLINIC | Age: 63
End: 2023-05-23
Payer: MEDICARE

## 2023-05-23 VITALS
TEMPERATURE: 96.6 F | HEIGHT: 62 IN | DIASTOLIC BLOOD PRESSURE: 67 MMHG | BODY MASS INDEX: 25.4 KG/M2 | HEART RATE: 91 BPM | WEIGHT: 138 LBS | SYSTOLIC BLOOD PRESSURE: 105 MMHG | OXYGEN SATURATION: 96 %

## 2023-05-23 DIAGNOSIS — K21.9 GASTRO-ESOPHAGEAL REFLUX DISEASE W/OUT ESOPHAGITIS: ICD-10-CM

## 2023-05-23 PROCEDURE — 99214 OFFICE O/P EST MOD 30 MIN: CPT | Mod: 25

## 2023-05-23 PROCEDURE — 51701 INSERT BLADDER CATHETER: CPT

## 2023-05-23 PROCEDURE — 99213 OFFICE O/P EST LOW 20 MIN: CPT

## 2023-05-23 NOTE — PHYSICAL EXAM

## 2023-05-23 NOTE — PROCEDURE
[Straight Catheterization] : insertion of a straight catheter [Urinary Tract Infection] : a urinary tract infection [Urinary Frequency] : urinary frequency [___ Fr Straight Tip] : a [unfilled] in [place] straight tip catheter [None] : there were no complications with the catheter insertion [Clear] : clear [Culture] : culture

## 2023-05-23 NOTE — PHYSICAL EXAM
[Chaperone Present] : A chaperone was present in the examining room during all aspects of the physical examination [Labia Majora] : were normal [Labia Minora] : were normal [Normal Appearance] : general appearance was normal [Atrophy] : atrophy [No Bleeding] : there was no active vaginal bleeding [2] : 2 [Aa ____] : Aa [unfilled] [Ba ____] : Ba [unfilled] [C ____] : C [unfilled] [GH ____] : GH [unfilled] [PB ____] : PB [unfilled] [TVL ____] : TVL  [unfilled] [Ap ____] : Ap [unfilled] [Bp ____] : Bp [unfilled] [D ____] : D [unfilled] [Normal] : no abnormalities [Normal rectal exam] : was normal [FreeTextEntry1] : General: Well, appearing. Alert and orientated. No acute distress\par HEENT: Normocephalic, atraumatic and extraocular muscles appear to be intact \par Neck: Full range of motion, no obvious lymphadenopathy, deformities, or masses noted \par Respiratory: Speaking in full sentences comfortably, normal work of breathing and no cough during visit\par Musculoskeletal: full range of motion \par Extremities: No upper extremity edema noted\par Skin: no obvious rash or skin lesions\par Neuro: Orientated X 3, speech is fluent, normal rate\par Psych: Normal mood and affect  [Tenderness] : ~T no ~M abdominal tenderness observed [Distended] : not distended [FreeTextEntry3] : (+) hypermobility, (-) cough stress test

## 2023-05-23 NOTE — HISTORY OF PRESENT ILLNESS
[Uterine Prolapse] : no [Vaginal Wall Prolapse] : no [Rectal Prolapse] : no [Unable To Restrain Bowel Movement] : no [Urinary Frequency] : no [Feelings Of Urinary Urgency] : no [Urinary Tract Infection] : severe [Pain During Urination (Dysuria)] : severe [Constipation Obstructed Defecation] : severe [Stool Visible Blood] : no [Incomplete Emptying Of Stool] : no [Pelvic Pain] : no [Vaginal Pain] : no [Rectal Pain] : no [] : no [de-identified] : occasionally  [de-identified] : 5-6x/d [de-identified] : Sometimes  [de-identified] : Almost every day [de-identified] : 3-4x/night  [de-identified] : Most of the time, will lean forward   [FreeTextEntry6] : Chronic constipation on senna and miralax daily  [FreeTextEntry1] : \eloy Roy is a 61yo who presents for follow-up of nocturia, urgency, urinary hesitancy. For her urinary hesitancy and incomplete emptying, she had seen Dr Hayes in 2020/2021 and underwent UDS and cystoscopy, which were both unremarkable.  Her PVR at her last visit was 10 mL.  She had seen a pelvic floor PT, where she learned pelvic floor relaxation techniques that have helped with her symptoms; however, she had stopped going. Since her last visit on 9/30/2022, she underwent a robotic assisted anterior resection with suture rectopexy for her rectal prolapse with resolution of her rectal prolapse symptoms.  She has started seeing a nutritionist and reports her constipation has significantly improved.  In addition, she has lost over 10 pounds.\par \par She presents for follow-up today because of nocturia 3-4 times per night.  Denies dysuria, daytime frequency.\par \par PMH: chronic constipation, GERD, osteoarthitis, rectal prolapse, migraines, endometriosis, bipolar disorder.  Denies history of glaucoma\par PSH: RA-anterior resection/suture rectopexy, multiple orthopedic surgeries, cervical spine surgery (C4-5), 2 laparoscopies for endometriosis (2000)\par Soc: current <1/2 ppd smoker >40y, also vapes, social alcohol, single \par \par Daily fluid intake: 80-92oz water + 14-28oz coffee + 14oz donald latte 2-3x/wk. Last drink at 7pm, sips before bed and at night if she has a dry mouth.

## 2023-05-24 ENCOUNTER — APPOINTMENT (OUTPATIENT)
Dept: PODIATRY | Facility: CLINIC | Age: 63
End: 2023-05-24

## 2023-05-24 ENCOUNTER — OUTPATIENT (OUTPATIENT)
Dept: OUTPATIENT SERVICES | Facility: HOSPITAL | Age: 63
LOS: 1 days | End: 2023-05-24
Payer: COMMERCIAL

## 2023-05-24 ENCOUNTER — NON-APPOINTMENT (OUTPATIENT)
Age: 63
End: 2023-05-24

## 2023-05-24 VITALS
HEART RATE: 108 BPM | OXYGEN SATURATION: 96 % | RESPIRATION RATE: 18 BRPM | BODY MASS INDEX: 25.4 KG/M2 | WEIGHT: 138 LBS | TEMPERATURE: 97.1 F | HEIGHT: 62 IN

## 2023-05-24 DIAGNOSIS — Z98.890 OTHER SPECIFIED POSTPROCEDURAL STATES: Chronic | ICD-10-CM

## 2023-05-24 DIAGNOSIS — Z00.00 ENCOUNTER FOR GENERAL ADULT MEDICAL EXAMINATION WITHOUT ABNORMAL FINDINGS: ICD-10-CM

## 2023-05-24 DIAGNOSIS — Z98.89 OTHER SPECIFIED POSTPROCEDURAL STATES: Chronic | ICD-10-CM

## 2023-05-24 PROCEDURE — G0463: CPT

## 2023-05-24 NOTE — HISTORY OF PRESENT ILLNESS
[FreeTextEntry1] : HPI: Patient returns to clinic. Patient ambulates in wearing a surgical shoe. She is s/p a 5th toe fracture on 1/28/23. Patient transitioned to a regular sneaker and was pain free.Admits she is not working out as of yet, however; has been ambulating without pain in a regular shoe. Inquires about when she can start working out again. States she missed last appointment 1 month ago due to other health issues, including stomach surgery. Also complains of generalized "heaviness" to b/l LE. \par \par On initial visit, patient presented to clinic for 5th digit injury against the bed frame. She went to [facility] on 1/28/23 and received xrays. she was told she fractured her 5th toe and was given a surgical shoe. she states she has no pain today compared to 7/10 pain on the day of injury.\par \par

## 2023-05-24 NOTE — ASSESSMENT
[FreeTextEntry1] : Derm: no open lesions or ecchymosis noted to left foot\par vasc: no edema noted, DP PT palpable b/l. skin tg warm to warm proximal to distal\par neuro: protective and epicritic sensation intact\par msk: no POP to left 5th digit. no gross deformities. minimal pain to 5th digit on weight bearing\par \par There is a nondisplaced fracture involving the distal aspect of the \par proximal phalanx of the fifth toe with soft tissue swelling.\par There are no other fractures or dislocation\par Large plantar spur.\par No radiopaque foreign body.\par \par A: \par left foot pain\par left 5th proximal phalanx fracture - nondisplaced \par \par \par P:\par patient seen and evaluated \par reviewed xrays from 4/28/23; healing to fracture site noted\par Full WBAT; patient may work out as tolerated \par Referral to PCP due to generalized heaviness to b/l LE for further workup \par RTC PRN \par \par Written by resident Farzana Hager [email or web address]

## 2023-05-25 DIAGNOSIS — S92.515D NONDISPLACED FRACTURE OF PROXIMAL PHALANX OF LEFT LESSER TOE(S), SUBSEQUENT ENCOUNTER FOR FRACTURE WITH ROUTINE HEALING: ICD-10-CM

## 2023-05-25 DIAGNOSIS — M79.672 PAIN IN LEFT FOOT: ICD-10-CM

## 2023-05-26 LAB — BACTERIA UR CULT: NORMAL

## 2023-05-31 ENCOUNTER — NON-APPOINTMENT (OUTPATIENT)
Age: 63
End: 2023-05-31

## 2023-06-06 ENCOUNTER — APPOINTMENT (OUTPATIENT)
Dept: SURGERY | Facility: CLINIC | Age: 63
End: 2023-06-06
Payer: MEDICARE

## 2023-06-06 VITALS
WEIGHT: 145 LBS | HEART RATE: 125 BPM | HEIGHT: 62 IN | SYSTOLIC BLOOD PRESSURE: 108 MMHG | DIASTOLIC BLOOD PRESSURE: 73 MMHG | BODY MASS INDEX: 26.68 KG/M2

## 2023-06-06 PROCEDURE — 99214 OFFICE O/P EST MOD 30 MIN: CPT

## 2023-06-09 NOTE — PHYSICAL EXAM
[Exam Deferred] : exam was deferred [Normal Rate and Rhythm] : normal rate and rhythm [Alert] : alert [Oriented to Person] : oriented to person [Oriented to Place] : oriented to place [Oriented to Time] : oriented to time [Anxious] : anxious [JVD] : no jugular venous distention  [de-identified] : soft, non-distended, non-tender to palpation. Incisions well healed.  [de-identified] : awake, alert, NAD  [de-identified] : normocephalic, atraumatic, EOMI, normal conjunctiva  [de-identified] : b/l chest rise, EWOB on RA  [de-identified] : deferred [de-identified] : Normal strength  [de-identified] : abd as above [de-identified] : baseline anxious

## 2023-06-09 NOTE — HISTORY OF PRESENT ILLNESS
[FreeTextEntry1] : Ms. PHYLLIS MERRITT is a 63 year F with a history of bipolar, chronic constipation, cervical pain, acid reflux, endometriosis, chronic migraines and rectal prolapse here for a follow up visit. She originally saw Dr. Sanchez who recommended a abdominal rectal prolapse repair but she was doubtful of that recommendation is here for a second opinion because she wanted to know if it would be safer going through the rectum due to her prior surgeries for endometriosis. She has a history of chronic constipation and was taking Linzess and motrgrity twice a week (can't do it more often because it interferes with work then). On the other days she has been relying more on Herbal tea and Mineral oil. She has daily normal to soft BM, with occasional straining. She has a significant issue with having to strain to urinate each time. She has been having some issues with bloating as well. She has been Vegan for 5 years and has tried greek yogurt in the morning. She saw pelvic floor therapy in 2021 and has been doing breathing and Kegel exercises since (sounds like maybe she was diagnosed w/ some form of obstructive defecation in the past). Last colonoscopy (2019) revealed diverticulosis and 1 polyp.She underwent a MRI defecography on 9/19/2022 which revealed a small rectocele, rectal prolapse, mild vaginal prolapse and pelvic floor weakness. On 11/16/2022 she underwent an uneventful robotic anterior resection, suture rectopexy and flex sig. She had an uneventful post-op course and was discharged home on POD 3 having BMs and tolerating a diet. During 12/20/2022 visit, she was still on a LFD as fiber causes bloating and nausea. She is no longer taking Linzess or Motegrity and is able to have BMs regularly. She initially tried dulcolax which led to diarrhea and anal bleeding. She then tried MiraLAX and senna which did not help with constipation. She saw Dr. Granda who recommended MiraLAX BID, colace and Benefiber. She still has no recurrent rectal prolapse. She started seeing a nutritionist in January 2023. Today patient is complaining of continued straining with urination and is concerned that the constant straining will interfere with her rectopexy as well as the anxiety it causes her. She didn't get a chance to do pelvic floor PT after her rectopexy as she had other medical conditions that she was preoccupied with. She has since called to schedule an appointment but they don't have an appointment for a couple of months. She recently saw Dr. Parr who diagnosed her with rectocele, cystocele, uterovaginal prolapse. They have discussed surgery and patient is emotional today about everything. She has not noticed any recurrent rectal prolapse and is still not having recurrent constipation. She is looking for a new job and wanted advice on whether she should postpone her job search until after her surgery.\par

## 2023-06-09 NOTE — ASSESSMENT
[FreeTextEntry1] : Ms. PHYLLIS MERRITT is a 63 year F w/ a history of chronic constipation, rectal prolapse and pelvic floor dysfunction (weakness) s/p robotic anterior resection and suture rectopexy on 11/16/2022 presenting for follow-up.

## 2023-06-12 ENCOUNTER — NON-APPOINTMENT (OUTPATIENT)
Age: 63
End: 2023-06-12

## 2023-06-15 ENCOUNTER — APPOINTMENT (OUTPATIENT)
Dept: UROGYNECOLOGY | Facility: CLINIC | Age: 63
End: 2023-06-15
Payer: MEDICARE

## 2023-06-15 VITALS
DIASTOLIC BLOOD PRESSURE: 78 MMHG | BODY MASS INDEX: 27.05 KG/M2 | SYSTOLIC BLOOD PRESSURE: 114 MMHG | RESPIRATION RATE: 16 BRPM | TEMPERATURE: 98 F | HEART RATE: 85 BPM | WEIGHT: 147 LBS | OXYGEN SATURATION: 98 % | HEIGHT: 62 IN

## 2023-06-15 DIAGNOSIS — N32.81 OVERACTIVE BLADDER: ICD-10-CM

## 2023-06-15 DIAGNOSIS — R35.1 NOCTURIA: ICD-10-CM

## 2023-06-15 DIAGNOSIS — R39.15 URGENCY OF URINATION: ICD-10-CM

## 2023-06-15 PROCEDURE — 99406 BEHAV CHNG SMOKING 3-10 MIN: CPT

## 2023-06-15 PROCEDURE — 99215 OFFICE O/P EST HI 40 MIN: CPT

## 2023-06-15 RX ORDER — SOLIFENACIN SUCCINATE 5 MG/1
5 TABLET ORAL
Qty: 30 | Refills: 3 | Status: ACTIVE | COMMUNITY
Start: 2023-06-15 | End: 1900-01-01

## 2023-06-15 NOTE — DISCUSSION/SUMMARY
[Visit Time ___ Minutes] : [unfilled] minutes [FreeTextEntry1] : \eloy Roy presents for follow up of overactive bladder, straining to urinate, and stage 2 uterovaginal prolapse, cystocele, rectocele. We discussed how the pathogenesis of rectal prolapse and pelvic organ prolapse are similar and that constant pressure on the pelvic floor will ultimately weaken the connective tissues that hold the pelvic organs in place. In addition, we discussed how repair of one compartment can result in a transference of pressure to other compartments and result in stress shielding and weakening of surrounding tissues, which may be why she has more pronounced pelvic organ prolapse now compared to before her rectopexy surgery. Treatment options for the prolapse were discussed and included observation, pelvic floor exercises with or without physical therapy, a pessary, or surgical correction. We discussed that 80% of women will remain the same stage at 16-18 months without any intervention. In addition, we discussed how surgery is elective and only indicated if she has bothersome symptoms and wants surgery. We discussed that as she doesn't have bothersome pressure or bulge symptoms currently, there is no need for surgery at this time. Instead, she can perform pelvic floor exercises with physical therapy, as it has helped her in the past. We discussed a pessary trial if she does become symptomatic, as it can be done without significant time commitment and can have immediate results. \par \par We again extensively discussed management options for overactive bladder including observation, fluid and behavioral modifications, bladder training, physical therapy and medications including anticholinergics and beta 3 agonists.  Specifically, we discussed that there are many foods and drinks that are bladder irritants and that eliminating them from her diet may improve her urinary symptoms. We discussed that there are many overactive bladder medications; however, none have been clearly shown to be superior to the others and they differ in their side effects. Side effects include dry eye, dry mouth, constipation, hypertension, dizziness, and memory problems. We discussed that we may need to trial a few medications to find one that is effective at controlling her symptoms with an acceptable side effect profile. We discussed that some of her straining to urinate may be because she feels an urge from her overactive bladder but has minimal urine. We discussed how trying an OAB medication may help her nocturia and urgency symptoms. We also discussed how it may take 4-6 weeks to see full effects from a medication. \par \par In addition, we discussed how her straining to urinate may be a behavioral maladaptation from her pelvic floor dysfunction and that when there is a lack of coordination between her pelvic floor and bladder, she can unintentionally block the urine outflow. With persistent straining, we discussed how that puts pressure on the pelvic floor and can worsen prolapse or cause it to recur after repair. We discussed a trial of an alpha-blocker (tamsulosin, alfuzosin), which would be used off label for obstructive urinary symptoms. We also reviewed pelvic floor relaxation techniques, avoiding straining to urinate, and pelvic floor physical therapy. \par \par At this time, she wants to start with an OAB medication. eRx for solifenacin wsa sent to her pharmacy. She was instructed to call her insurance company to find out what OAB medications are covered if solifenacin is not. In addition, she will focus on pelvic floor relaxation and will start PT in August. \par \par Lastly, we reviewed smoking cessation.  We reviewed the harms of smoking to the lungs and to the urinary tract.  We discussed how smoking can worsen many bladder conditions. She will follow up with her PCP regarding smoking cessation. \par \par RTO 2 months for OAB follow up.

## 2023-06-15 NOTE — PHYSICAL EXAM
[FreeTextEntry1] : General: Well, appearing. Alert and orientated. \par HEENT: Normocephalic, atraumatic and extraocular muscles appear to be intact \par Neck: Full range of motion, no obvious lymphadenopathy, deformities, or masses noted \par Respiratory: Speaking in full sentences comfortably, normal work of breathing and no cough during visit\par Musculoskeletal: full range of motion\par Extremities: No upper extremity edema noted\par Skin: no obvious rash or skin lesions\par Neuro: Orientated X 3, speech is fluent, normal rate\par Psych: Slightly anxious and emotional, became teary when talking about her anxiety

## 2023-06-15 NOTE — HISTORY OF PRESENT ILLNESS
[Uterine Prolapse] : no [Vaginal Wall Prolapse] : no [Rectal Prolapse] : no [Unable To Restrain Bowel Movement] : no [Urinary Frequency] : no [Feelings Of Urinary Urgency] : no [Urinary Tract Infection] : severe [Pain During Urination (Dysuria)] : severe [Constipation Obstructed Defecation] : severe [Stool Visible Blood] : no [Incomplete Emptying Of Stool] : no [Pelvic Pain] : no [Vaginal Pain] : no [Rectal Pain] : no [] : mild [de-identified] : rarely [de-identified] : 5-6x/d [de-identified] : Sometimes  [de-identified] : Almost every day [de-identified] : 3-4x/night  [de-identified] : Most of the time, will lean forward   [de-identified] : daily  [FreeTextEntry6] : Chronic constipation on senna and miralax daily  [FreeTextEntry1] : \eloy Roy is a 64yo who presents for follow-up of urinary hesitancy and straining to urinate for many years. She had a negative work up in 2021. She underwent a robotic anterior resection and suture rectopexy with Dr. Rubio in November 2022 for rectal prolapse and chronic constipation. Her rectal prolapse has resolved and constipation has significantly improved. 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 \par \par Since her surgery, she has continued to strain to urinate. She tried to cut out some coffee and make the other fluid modifications. She is concerned that her constant straining will impact her rectopexy and worried that she will require additional surgery. \par \par PMH: chronic constipation, GERD, osteoarthitis, migraines, endometriosis, bipolar disorder.  Denies history of glaucoma\par PSH: RA-anterior resection/suture rectopexy, multiple orthopedic surgeries, cervical spine surgery (C4-5), 2 laparoscopies for endometriosis (2000)\par Soc: current <1/2 ppd smoker >40y, also vapes, social alcohol, single,  \par \par Daily fluid intake: 80-92oz water + 14-28oz coffee. Last drink at 7pm, sips before bed and at night if she has a dry mouth.

## 2023-06-21 NOTE — ASSESSMENT
[FreeTextEntry1] : This is a 62 year old female here for a followup of reflux. Currently she is on Omeprazole 40mg daily. She feels the reflux has worsen. Is taking Tums several times a week. She was on Omeprazole 40mg BID but self reduced due to fear of potential side effects. Has has epigastric discomfort as well. She wants to consider trying 20mg BID. EGD from 2021 with hiatal hernia done by other. No blood or dark tarry stools. Loosing weight to better manage her bowel habits and reflux. \par \par My plan \par PPI 20mg BID\par Referral to Dr. Vargas for PH and gastric motility study

## 2023-06-21 NOTE — HISTORY OF PRESENT ILLNESS
[FreeTextEntry1] : This is a 62 year old female here for a followup of reflux. Currently she is on Omeprazole 40mg daily. She feels the reflux has worsen. Is taking Tums several times a week. She was on Omeprazole 40mg BID but self reduced due to fear of potential side effects. Has has epigastric discomfort as well. She wants to consider trying 20mg BID. EGD from 2021 with hiatal hernia done by other. No blood or dark tarry stools. Loosing weight to better manage her bowel habits and reflux.

## 2023-07-17 ENCOUNTER — APPOINTMENT (OUTPATIENT)
Dept: OTOLARYNGOLOGY | Facility: CLINIC | Age: 63
End: 2023-07-17

## 2023-07-25 ENCOUNTER — APPOINTMENT (OUTPATIENT)
Dept: SURGERY | Facility: CLINIC | Age: 63
End: 2023-07-25
Payer: MEDICARE

## 2023-07-25 VITALS
HEART RATE: 112 BPM | BODY MASS INDEX: 27.05 KG/M2 | SYSTOLIC BLOOD PRESSURE: 118 MMHG | HEIGHT: 62 IN | WEIGHT: 147 LBS | DIASTOLIC BLOOD PRESSURE: 79 MMHG

## 2023-07-25 PROCEDURE — 99214 OFFICE O/P EST MOD 30 MIN: CPT

## 2023-07-25 NOTE — HISTORY OF PRESENT ILLNESS
[FreeTextEntry1] : Ms. PHYLLIS MERRITT is a 63 year F with a history of bipolar, chronic constipation, cervical pain, acid reflux, endometriosis, chronic migraines and rectal prolapse here for a follow up visit. She originally saw Dr. Sanchez who recommended a abdominal rectal prolapse repair but she was doubtful of that recommendation is here for a second opinion because she wanted to know if it would be safer going through the rectum due to her prior surgeries for endometriosis. She has a history of chronic constipation and was taking Linzess and motrgrity twice a week (can't do it more often because it interferes with work then). On the other days she has been relying more on Herbal tea and Mineral oil. She has daily normal to soft BM, with occasional straining. She has a significant issue with having to strain to urinate each time. She has been having some issues with bloating as well. She has been Vegan for 5 years and has tried greek yogurt in the morning. She saw pelvic floor therapy in 2021 and has been doing breathing and Kegel exercises since (sounds like maybe she was diagnosed w/ some form of obstructive defecation in the past). Last colonoscopy (2019) revealed diverticulosis and 1 polyp.She underwent a MRI defecography on 9/19/2022 which revealed a small rectocele, rectal prolapse, mild vaginal prolapse and pelvic floor weakness. On 11/16/2022 she underwent an uneventful robotic anterior resection, suture rectopexy and flex sig. She had an uneventful post-op course and was discharged home on POD 3 having BMs and tolerating a diet. During 12/20/2022 visit, she was still on a LFD as fiber causes bloating and nausea. She is no longer taking Linzess or Motegrity and is able to have BMs regularly. She initially tried dulcolax which led to diarrhea and anal bleeding. She then tried MiraLAX and senna which did not help with constipation. She saw Dr. Granda who recommended MiraLAX BID, colace and Benefiber. She still has no recurrent rectal prolapse. She started seeing a nutritionist in January 2023. During 6/6/2023 visit, patient is complaining of continued straining with urination and is concerned that the constant straining will interfere with her rectopexy as well as the anxiety it causes her. She didn't get a chance to do pelvic floor PT after her rectopexy as she had other medical conditions that she was preoccupied with. She has since called to schedule an appointment but they don't have an appointment for a couple of months. She followed-up with Dr. Parr who diagnosed her with rectocele, cystocele, uterovaginal prolapse. Today patient is experiencing constipation and still has to strain to urinate. She has been taking senna without much relief and does not want to restart Linzess (was too strong for her). Dr. Parr recently prescribed solifenacin Succinate for OAB which she reports helps with the nocturia. She would also like to proceed with surgery with Dr. Parr because she doesn't want to risk losing another job in the future by requiring surgery and she is hoping it will help feel good like surgery for the rectal prolapse did.

## 2023-07-25 NOTE — PHYSICAL EXAM
[Exam Deferred] : exam was deferred [Normal Rate and Rhythm] : normal rate and rhythm [Alert] : alert [Oriented to Person] : oriented to person [Oriented to Place] : oriented to place [Oriented to Time] : oriented to time [Anxious] : anxious [JVD] : no jugular venous distention  [de-identified] : soft, non-distended, non-tender to palpation. Incisions well healed.  [de-identified] : awake, alert, NAD  [de-identified] : normocephalic, atraumatic, EOMI, normal conjunctiva  [de-identified] : b/l chest rise, EWOB on RA  [de-identified] : deferred [de-identified] : Normal strength  [de-identified] : abd as above [de-identified] : baseline anxious

## 2023-08-01 ENCOUNTER — RX RENEWAL (OUTPATIENT)
Age: 63
End: 2023-08-01

## 2023-08-01 RX ORDER — PANTOPRAZOLE 20 MG/1
20 TABLET, DELAYED RELEASE ORAL TWICE DAILY
Qty: 60 | Refills: 0 | Status: ACTIVE | COMMUNITY
Start: 2023-05-23 | End: 1900-01-01

## 2023-08-03 ENCOUNTER — APPOINTMENT (OUTPATIENT)
Dept: UROGYNECOLOGY | Facility: CLINIC | Age: 63
End: 2023-08-03
Payer: MEDICARE

## 2023-08-03 VITALS
OXYGEN SATURATION: 98 % | BODY MASS INDEX: 27.05 KG/M2 | WEIGHT: 147 LBS | TEMPERATURE: 98 F | HEART RATE: 87 BPM | RESPIRATION RATE: 16 BRPM | HEIGHT: 62 IN | SYSTOLIC BLOOD PRESSURE: 114 MMHG | DIASTOLIC BLOOD PRESSURE: 72 MMHG

## 2023-08-03 DIAGNOSIS — N95.2 POSTMENOPAUSAL ATROPHIC VAGINITIS: ICD-10-CM

## 2023-08-03 DIAGNOSIS — N81.11 CYSTOCELE, MIDLINE: ICD-10-CM

## 2023-08-03 DIAGNOSIS — N81.6 RECTOCELE: ICD-10-CM

## 2023-08-03 PROCEDURE — 99215 OFFICE O/P EST HI 40 MIN: CPT | Mod: 25

## 2023-08-03 PROCEDURE — 51798 US URINE CAPACITY MEASURE: CPT

## 2023-08-03 RX ORDER — ESTRADIOL 0.1 MG/G
0.1 CREAM VAGINAL
Qty: 1 | Refills: 3 | Status: ACTIVE | COMMUNITY
Start: 2023-08-03 | End: 1900-01-01

## 2023-08-03 NOTE — DISCUSSION/SUMMARY
[Visit Time ___ Minutes] : [unfilled] minutes [FreeTextEntry1] : Manuela presents for follow up of stage 2 uterovaginal prolapse, cystocele, rectocele and lower urinary tract symptoms. We reviewed the pathogenesis of pelvic organ prolapse and how constant pressure on the pelvic floor from constipation/constant straining will ultimately weaken the connective tissues that hold the pelvic organs in place. Treatment options for the prolapse were discussed and included observation, pelvic floor exercises with or without physical therapy, a pessary, or surgical correction. We discussed that prolapse is not a dangerous condition, so observation is an option and there is no urgent need for treatment. We discussed a pessary trial to see if her abdominal pain improves with her prolapse reduced. She tried a few pessaries today and did not want to try any others because of her abdominal pain. We discussed returning for a pessary fitting (will try a ring #1) at a later date. In addition, we discussed how surgery is elective and only indicated if she has bothersome pressure or bulge and wants surgery. We discussed that her abdominal symptoms may not be related to her prolapse but could be related to her constipation, which is why a pessary trial would help us determine how much her prolapse is contributing to her symptoms. In addition, we reviewed the increased surgical risk due to her history of multiple prior abdominal surgeries and endometriosis.   We again extensively discussed her lower urinary tract symptoms. We reviewed management options for overactive bladder including observation, fluid and behavioral modifications, bladder training, physical therapy and medications including anticholinergics and beta 3 agonists. Specifically, we discussed that there are many foods and drinks that are bladder irritants and that eliminating them from her diet may improve her urinary symptoms. We discussed that she only needs an overactive bladder medication if she has bothersome urinary urgency, frequency, nocturia and that if she has been managing her symptoms with fluid changes then she can continue.   We discussed that some of her straining to urinate may be because she feels an urge from her overactive bladder but has minimal urine since she has had negative prior evaluations and normal PVRs at each visit. In addition, we discussed how her straining to urinate may be a behavioral maladaptation from her pelvic floor dysfunction and that when there is a lack of coordination between her pelvic floor and bladder, she can unintentionally block the urine outflow. With persistent straining, we discussed how that puts pressure on the pelvic floor and can worsen prolapse or cause it to recur after repair.   Lastly, she has some tenderness of her levator ani and atrophy on exam. We discussed how after menopause, the vagina can become hypoestrogenic. Risks and benefits of vaginal estrogen were reviewed and include local action, minimal systemic absorption, lack of association with any breast or gyn malignancies. She will start using vaginal estrogen in preparation for her repeat pessary fitting. At this time, she will also continue with pelvic floor PT and avoidance of straining to urinate.   RTO for pessary fitting.

## 2023-08-03 NOTE — HISTORY OF PRESENT ILLNESS
[Uterine Prolapse] : no [Vaginal Wall Prolapse] : no [Rectal Prolapse] : no [Unable To Restrain Bowel Movement] : no [Urinary Frequency] : no [Feelings Of Urinary Urgency] : no [Pain During Urination (Dysuria)] : severe [Constipation Obstructed Defecation] : severe [Stool Visible Blood] : no [Incomplete Emptying Of Stool] : no [Pelvic Pain] : no [Vaginal Pain] : no [Rectal Pain] : no [Urinary Frequency More Than Twice At Night (Nocturia)] : no nocturia [] : no [Urinary Tract Infection] : no [de-identified] : rarely [de-identified] : 5-6x/d [de-identified] : Sometimes  [de-identified] : Almost every day [de-identified] : to finish urinating [FreeTextEntry6] : Chronic constipation on multiple medications [FreeTextEntry1] : Manuela is a 64yo who presents for follow-up of Stage 2 uterovaginal prolapse, cystocele, rectocele, and lower urinary tract symptoms. She has a long history of straining to urinate despite having had a negative work up in 2021. All of her PVRs have been normal. She underwent a robotic anterior resection and suture rectopexy with Dr. Rubio in November 2022 for rectal prolapse and chronic constipation. Her rectal prolapse has resolved and constipation initially improved; however, it has recurred. In addition, she has significant abdominal bloating and pain.   Since her surgery, she has continued to strain to have bowel movements and to urinate. At her last visit on 6/15/23, we extensively reviewed her symptoms and her management options. She presents today with her roommate for further counseling. She has started going to pelvic floor PT and did not start taking solifenacin. Instead, she stopped drinking a special tea, and her urinary symptoms have resolved.   PMH: chronic constipation, GERD, osteoarthitis, migraines, endometriosis, bipolar disorder.  Denies history of glaucoma PSH: RA-anterior resection/suture rectopexy, multiple orthopedic surgeries, cervical spine surgery (C4-5), 2 laparoscopies for endometriosis (2000) Soc: current <1/2 ppd smoker >40y, also vapes, social alcohol, single   Daily fluid intake: 80-92oz water + 14-28oz coffee. Last drink at 7pm, sips before bed and at night if she has a dry mouth.

## 2023-08-03 NOTE — PHYSICAL EXAM
[Tenderness] : ~T no ~M abdominal tenderness observed [Distended] : not distended [Chaperone Present] : A chaperone was present in the examining room during all aspects of the physical examination [Labia Majora] : were normal [Labia Minora] : were normal [Normal Appearance] : general appearance was normal [Atrophy] : atrophy [Rectocele] : a rectocele [Cystocele] : a cystocele [Uterine Prolapse] : uterine prolapse [No Bleeding] : there was no active vaginal bleeding [3] : 3 [Aa ____] : Aa [unfilled] [Ba ____] : Ba [unfilled] [C ____] : C [unfilled] [GH ____] : GH [unfilled] [PB ____] : PB [unfilled] [TVL ____] : TVL  [unfilled] [Ap ____] : Ap [unfilled] [Bp ____] : Bp [unfilled] [D ____] : D [unfilled] [Normal rectal exam] : was normal [Normal] : was normal [FreeTextEntry1] : General: Well, appearing. Alert and orientated. \par  HEENT: Normocephalic, atraumatic and extraocular muscles appear to be intact \par  Neck: Full range of motion, no obvious lymphadenopathy, deformities, or masses noted \par  Respiratory: Speaking in full sentences comfortably, normal work of breathing and no cough during visit\par  Musculoskeletal: full range of motion\par  Extremities: No upper extremity edema noted\par  Skin: no obvious rash or skin lesions\par  Neuro: Orientated X 3, speech is fluent, normal rate\par  Psych: Slightly anxious and emotional, became teary when talking about her anxiety [FreeTextEntry4] : Mild tenderness of levator ani

## 2023-08-05 ENCOUNTER — EMERGENCY (EMERGENCY)
Facility: HOSPITAL | Age: 63
LOS: 1 days | Discharge: HOME | End: 2023-08-05
Attending: EMERGENCY MEDICINE
Payer: COMMERCIAL

## 2023-08-05 VITALS
DIASTOLIC BLOOD PRESSURE: 80 MMHG | SYSTOLIC BLOOD PRESSURE: 126 MMHG | OXYGEN SATURATION: 97 % | HEART RATE: 64 BPM | RESPIRATION RATE: 17 BRPM

## 2023-08-05 VITALS
DIASTOLIC BLOOD PRESSURE: 76 MMHG | SYSTOLIC BLOOD PRESSURE: 117 MMHG | HEART RATE: 83 BPM | RESPIRATION RATE: 18 BRPM | OXYGEN SATURATION: 97 % | TEMPERATURE: 98 F | WEIGHT: 147.71 LBS

## 2023-08-05 DIAGNOSIS — Z98.890 OTHER SPECIFIED POSTPROCEDURAL STATES: Chronic | ICD-10-CM

## 2023-08-05 DIAGNOSIS — Z98.89 OTHER SPECIFIED POSTPROCEDURAL STATES: Chronic | ICD-10-CM

## 2023-08-05 LAB
ALBUMIN SERPL ELPH-MCNC: 3.3 G/DL — LOW (ref 3.5–5)
ALP SERPL-CCNC: 95 U/L — SIGNIFICANT CHANGE UP (ref 40–120)
ALT FLD-CCNC: 34 U/L DA — SIGNIFICANT CHANGE UP (ref 10–60)
ANION GAP SERPL CALC-SCNC: 8 MMOL/L — SIGNIFICANT CHANGE UP (ref 5–17)
APPEARANCE UR: CLEAR — SIGNIFICANT CHANGE UP
AST SERPL-CCNC: 22 U/L — SIGNIFICANT CHANGE UP (ref 10–40)
BASOPHILS # BLD AUTO: 0.05 K/UL — SIGNIFICANT CHANGE UP (ref 0–0.2)
BASOPHILS NFR BLD AUTO: 0.8 % — SIGNIFICANT CHANGE UP (ref 0–2)
BILIRUB SERPL-MCNC: 0.3 MG/DL — SIGNIFICANT CHANGE UP (ref 0.2–1.2)
BILIRUB UR-MCNC: NEGATIVE — SIGNIFICANT CHANGE UP
BUN SERPL-MCNC: 13 MG/DL — SIGNIFICANT CHANGE UP (ref 7–18)
CALCIUM SERPL-MCNC: 8.7 MG/DL — SIGNIFICANT CHANGE UP (ref 8.4–10.5)
CHLORIDE SERPL-SCNC: 109 MMOL/L — HIGH (ref 96–108)
CO2 SERPL-SCNC: 22 MMOL/L — SIGNIFICANT CHANGE UP (ref 22–31)
COLOR SPEC: YELLOW — SIGNIFICANT CHANGE UP
CREAT SERPL-MCNC: 0.91 MG/DL — SIGNIFICANT CHANGE UP (ref 0.5–1.3)
DIFF PNL FLD: NEGATIVE — SIGNIFICANT CHANGE UP
EGFR: 72 ML/MIN/1.73M2 — SIGNIFICANT CHANGE UP
EOSINOPHIL # BLD AUTO: 0.14 K/UL — SIGNIFICANT CHANGE UP (ref 0–0.5)
EOSINOPHIL NFR BLD AUTO: 2.1 % — SIGNIFICANT CHANGE UP (ref 0–6)
GLUCOSE SERPL-MCNC: 96 MG/DL — SIGNIFICANT CHANGE UP (ref 70–99)
GLUCOSE UR QL: NEGATIVE — SIGNIFICANT CHANGE UP
HCT VFR BLD CALC: 38.2 % — SIGNIFICANT CHANGE UP (ref 34.5–45)
HGB BLD-MCNC: 12.2 G/DL — SIGNIFICANT CHANGE UP (ref 11.5–15.5)
IMM GRANULOCYTES NFR BLD AUTO: 0.6 % — SIGNIFICANT CHANGE UP (ref 0–0.9)
KETONES UR-MCNC: NEGATIVE — SIGNIFICANT CHANGE UP
LEUKOCYTE ESTERASE UR-ACNC: NEGATIVE — SIGNIFICANT CHANGE UP
LIDOCAIN IGE QN: 119 U/L — SIGNIFICANT CHANGE UP (ref 73–393)
LYMPHOCYTES # BLD AUTO: 1.83 K/UL — SIGNIFICANT CHANGE UP (ref 1–3.3)
LYMPHOCYTES # BLD AUTO: 27.7 % — SIGNIFICANT CHANGE UP (ref 13–44)
MCHC RBC-ENTMCNC: 28.8 PG — SIGNIFICANT CHANGE UP (ref 27–34)
MCHC RBC-ENTMCNC: 31.9 GM/DL — LOW (ref 32–36)
MCV RBC AUTO: 90.1 FL — SIGNIFICANT CHANGE UP (ref 80–100)
MONOCYTES # BLD AUTO: 0.61 K/UL — SIGNIFICANT CHANGE UP (ref 0–0.9)
MONOCYTES NFR BLD AUTO: 9.2 % — SIGNIFICANT CHANGE UP (ref 2–14)
NEUTROPHILS # BLD AUTO: 3.93 K/UL — SIGNIFICANT CHANGE UP (ref 1.8–7.4)
NEUTROPHILS NFR BLD AUTO: 59.6 % — SIGNIFICANT CHANGE UP (ref 43–77)
NITRITE UR-MCNC: NEGATIVE — SIGNIFICANT CHANGE UP
NRBC # BLD: 0 /100 WBCS — SIGNIFICANT CHANGE UP (ref 0–0)
PH UR: 5 — SIGNIFICANT CHANGE UP (ref 5–8)
PLATELET # BLD AUTO: 200 K/UL — SIGNIFICANT CHANGE UP (ref 150–400)
POTASSIUM SERPL-MCNC: 4 MMOL/L — SIGNIFICANT CHANGE UP (ref 3.5–5.3)
POTASSIUM SERPL-SCNC: 4 MMOL/L — SIGNIFICANT CHANGE UP (ref 3.5–5.3)
PROT SERPL-MCNC: 7 G/DL — SIGNIFICANT CHANGE UP (ref 6–8.3)
PROT UR-MCNC: NEGATIVE — SIGNIFICANT CHANGE UP
RBC # BLD: 4.24 M/UL — SIGNIFICANT CHANGE UP (ref 3.8–5.2)
RBC # FLD: 13.2 % — SIGNIFICANT CHANGE UP (ref 10.3–14.5)
SODIUM SERPL-SCNC: 139 MMOL/L — SIGNIFICANT CHANGE UP (ref 135–145)
SP GR SPEC: 1 — LOW (ref 1.01–1.02)
UROBILINOGEN FLD QL: NEGATIVE — SIGNIFICANT CHANGE UP
WBC # BLD: 6.6 K/UL — SIGNIFICANT CHANGE UP (ref 3.8–10.5)
WBC # FLD AUTO: 6.6 K/UL — SIGNIFICANT CHANGE UP (ref 3.8–10.5)

## 2023-08-05 PROCEDURE — 99284 EMERGENCY DEPT VISIT MOD MDM: CPT | Mod: 25

## 2023-08-05 PROCEDURE — 96374 THER/PROPH/DIAG INJ IV PUSH: CPT

## 2023-08-05 PROCEDURE — 72110 X-RAY EXAM L-2 SPINE 4/>VWS: CPT

## 2023-08-05 PROCEDURE — 83690 ASSAY OF LIPASE: CPT

## 2023-08-05 PROCEDURE — 74019 RADEX ABDOMEN 2 VIEWS: CPT | Mod: 26

## 2023-08-05 PROCEDURE — 81003 URINALYSIS AUTO W/O SCOPE: CPT

## 2023-08-05 PROCEDURE — 72110 X-RAY EXAM L-2 SPINE 4/>VWS: CPT | Mod: 26

## 2023-08-05 PROCEDURE — 85025 COMPLETE CBC W/AUTO DIFF WBC: CPT

## 2023-08-05 PROCEDURE — 36415 COLL VENOUS BLD VENIPUNCTURE: CPT

## 2023-08-05 PROCEDURE — 74019 RADEX ABDOMEN 2 VIEWS: CPT

## 2023-08-05 PROCEDURE — 99285 EMERGENCY DEPT VISIT HI MDM: CPT | Mod: 25

## 2023-08-05 PROCEDURE — 80053 COMPREHEN METABOLIC PANEL: CPT

## 2023-08-05 RX ORDER — DIAZEPAM 5 MG
2 TABLET ORAL ONCE
Refills: 0 | Status: DISCONTINUED | OUTPATIENT
Start: 2023-08-05 | End: 2023-08-05

## 2023-08-05 RX ORDER — CYCLOBENZAPRINE HYDROCHLORIDE 10 MG/1
1 TABLET, FILM COATED ORAL
Qty: 15 | Refills: 0
Start: 2023-08-05

## 2023-08-05 RX ORDER — KETOROLAC TROMETHAMINE 30 MG/ML
15 SYRINGE (ML) INJECTION ONCE
Refills: 0 | Status: DISCONTINUED | OUTPATIENT
Start: 2023-08-05 | End: 2023-08-05

## 2023-08-05 RX ORDER — LINACLOTIDE 145 UG/1
1 CAPSULE, GELATIN COATED ORAL
Qty: 20 | Refills: 0
Start: 2023-08-05

## 2023-08-05 RX ADMIN — Medication 2 MILLIGRAM(S): at 08:20

## 2023-08-05 RX ADMIN — Medication 15 MILLIGRAM(S): at 06:56

## 2023-08-05 RX ADMIN — Medication 15 MILLIGRAM(S): at 07:41

## 2023-08-05 NOTE — ED ADULT TRIAGE NOTE - CHIEF COMPLAINT QUOTE
lower back pain for 4-5 days suffered from constipation, pain worst on moving and  getting up from bed

## 2023-08-05 NOTE — ED PROVIDER NOTE - PATIENT PORTAL LINK FT
You can access the FollowMyHealth Patient Portal offered by [facility] by registering at the following website: [email or web address]. By joining [facility]’s FollowMyHealth portal, you will also be able to view your health information using other applications (apps) compatible with our system.

## 2023-08-05 NOTE — ED ADULT NURSE NOTE - OBJECTIVE STATEMENT
Pt presents to the ED with c/o of lower back pain x4 days. Pt denies urinary s/s, denies fever, chills OR n/v/.

## 2023-08-05 NOTE — ED PROVIDER NOTE - PROGRESS NOTE DETAILS
Lund:  Patient received in signout from Dr. Steward, pending labs and reassessment.  Labs came back normal and patient is feeling improved.  She asks for prescription for muscle relaxant so I will prescribe her Flexeril.  NSAIDs advised.  We will refer her to outpatient follow-up with spine and advised her she may need nonemergent spinal MRI if symptoms persist.  Return precautions given.

## 2023-08-05 NOTE — ED PROVIDER NOTE - CLINICAL SUMMARY MEDICAL DECISION MAKING FREE TEXT BOX
63-year-old female presenting with lower back pain.  Symptoms suggest [facility] etiology.  Consideration for UTI, constipation, among other causes.  Plan to perform labs, x-ray abdomen, urinalysis and trial of NSAIDs with muscle relaxants.

## 2023-08-05 NOTE — ED ADULT NURSE NOTE - NSFALLUNIVINTERV_ED_ALL_ED
Bed/Stretcher in lowest position, wheels locked, appropriate side rails in place/Call bell, personal items and telephone in reach/Instruct patient to call for assistance before getting out of bed/chair/stretcher/Non-slip footwear applied when patient is off stretcher/[place] to call system/Physically safe environment - no spills, clutter or unnecessary equipment/Purposeful proactive rounding/Room/bathroom lighting operational, light cord in reach

## 2023-08-05 NOTE — ED ADULT TRIAGE NOTE - NS ED TRIAGE AVPU SCALE
Discharge instructions reviewed with understanding, questions reviewed, copy given to patient's sister. Discharge instructions reviewed w/pt's sister w/verbalized understanding. Post procedure Mammo completed and reviewed MD, pt cleared for discharge. Biopsy site clean and dry, hemostasis achieved. Steri strips with DSD placed. Ice pack held by patient's bra. Pt's  verbalized he would like to be notified by phone of any results. Pt's , [email or web address], encouraged to call department if he has not received her results in 3-5 business days. [facility] advised not answer cell phone call while driving. Specimen verified w/pancho PAGE RT prior to being carried to gross by RN.
Alert-The patient is alert, awake and responds to voice. The patient is oriented to time, place, and person. The triage nurse is able to obtain subjective information.

## 2023-08-05 NOTE — ED PROVIDER NOTE - CARE PROVIDER_API CALL
Arvind Winston  Neurosurgery  [address] [place] [address]  Phone: [phone]  Fax: [phone]  Follow Up Time: 4-6 Days

## 2023-08-05 NOTE — ED PROVIDER NOTE - OBJECTIVE STATEMENT
63-year-old female history of bipolar disorder, GERD, constipation, pelvic floor dysfunction presenting with 1 week of dull lower back pain worse with movement.  Patient reports also feeling constipated but states she took Linzess and that "it cleared me out" yesterday.  Concerned that pain could be related to her kidney stone or UTI.  Denies any dysuria or frequency.  Denies any blood in urine.  Denies any bowel incontinence.

## 2023-08-10 ENCOUNTER — APPOINTMENT (OUTPATIENT)
Dept: OTOLARYNGOLOGY | Facility: CLINIC | Age: 63
End: 2023-08-10

## 2023-08-17 ENCOUNTER — APPOINTMENT (OUTPATIENT)
Dept: GASTROENTEROLOGY | Facility: CLINIC | Age: 63
End: 2023-08-17

## 2023-08-21 ENCOUNTER — TRANSCRIPTION ENCOUNTER (OUTPATIENT)
Age: 63
End: 2023-08-21

## 2023-08-22 ENCOUNTER — TRANSCRIPTION ENCOUNTER (OUTPATIENT)
Age: 63
End: 2023-08-22

## 2023-08-22 RX ORDER — LINACLOTIDE 290 UG/1
290 CAPSULE, GELATIN COATED ORAL
Qty: 30 | Refills: 0 | Status: ACTIVE | COMMUNITY
Start: 2023-08-22 | End: 1900-01-01

## 2023-08-31 RX ORDER — POLYETHYLENE GLYCOL 3350 17 G/17G
17 POWDER, FOR SOLUTION ORAL
Qty: 1 | Refills: 2 | Status: DISCONTINUED | COMMUNITY
Start: 2022-02-17 | End: 2023-08-31

## 2023-08-31 RX ORDER — PANTOPRAZOLE 40 MG/1
40 TABLET, DELAYED RELEASE ORAL DAILY
Qty: 30 | Refills: 3 | Status: DISCONTINUED | COMMUNITY
Start: 2023-05-10 | End: 2023-08-31

## 2023-08-31 RX ORDER — LUBIPROSTONE 8 UG/1
8 CAPSULE, GELATIN COATED ORAL
Qty: 60 | Refills: 0 | Status: DISCONTINUED | COMMUNITY
Start: 2022-04-19 | End: 2023-08-31

## 2023-09-14 ENCOUNTER — APPOINTMENT (OUTPATIENT)
Dept: OBGYN | Facility: CLINIC | Age: 63
End: 2023-09-14
Payer: MEDICARE

## 2023-09-14 ENCOUNTER — NON-APPOINTMENT (OUTPATIENT)
Age: 63
End: 2023-09-14

## 2023-09-14 VITALS — SYSTOLIC BLOOD PRESSURE: 110 MMHG | DIASTOLIC BLOOD PRESSURE: 70 MMHG | BODY MASS INDEX: 27.25 KG/M2 | WEIGHT: 149 LBS

## 2023-09-14 DIAGNOSIS — Z12.39 ENCOUNTER FOR OTHER SCREENING FOR MALIGNANT NEOPLASM OF BREAST: ICD-10-CM

## 2023-09-14 DIAGNOSIS — Z12.4 ENCOUNTER FOR SCREENING FOR MALIGNANT NEOPLASM OF CERVIX: ICD-10-CM

## 2023-09-14 DIAGNOSIS — N81.2 INCOMPLETE UTEROVAGINAL PROLAPSE: ICD-10-CM

## 2023-09-14 PROCEDURE — 99396 PREV VISIT EST AGE 40-64: CPT

## 2023-09-14 PROCEDURE — 99213 OFFICE O/P EST LOW 20 MIN: CPT | Mod: 25

## 2023-09-15 LAB — HPV HIGH+LOW RISK DNA PNL CVX: NOT DETECTED

## 2023-09-22 LAB — CYTOLOGY CVX/VAG DOC THIN PREP: ABNORMAL

## 2023-09-28 ENCOUNTER — APPOINTMENT (OUTPATIENT)
Dept: UROGYNECOLOGY | Facility: CLINIC | Age: 63
End: 2023-09-28
Payer: MEDICARE

## 2023-10-05 ENCOUNTER — APPOINTMENT (OUTPATIENT)
Dept: NEUROSURGERY | Facility: CLINIC | Age: 63
End: 2023-10-05
Payer: MEDICARE

## 2023-10-05 VITALS
SYSTOLIC BLOOD PRESSURE: 111 MMHG | WEIGHT: 149 LBS | DIASTOLIC BLOOD PRESSURE: 75 MMHG | TEMPERATURE: 97.9 F | HEART RATE: 77 BPM | HEIGHT: 62 IN | BODY MASS INDEX: 27.42 KG/M2 | OXYGEN SATURATION: 97 %

## 2023-10-05 DIAGNOSIS — M54.50 LOW BACK PAIN, UNSPECIFIED: ICD-10-CM

## 2023-10-05 PROCEDURE — 99203 OFFICE O/P NEW LOW 30 MIN: CPT

## 2023-10-06 ENCOUNTER — APPOINTMENT (OUTPATIENT)
Dept: OTOLARYNGOLOGY | Facility: CLINIC | Age: 63
End: 2023-10-06
Payer: MEDICARE

## 2023-10-06 VITALS
DIASTOLIC BLOOD PRESSURE: 75 MMHG | HEART RATE: 85 BPM | HEIGHT: 62 IN | SYSTOLIC BLOOD PRESSURE: 108 MMHG | TEMPERATURE: 97.1 F | BODY MASS INDEX: 27.05 KG/M2 | WEIGHT: 147 LBS

## 2023-10-06 DIAGNOSIS — R06.83 SNORING: ICD-10-CM

## 2023-10-06 DIAGNOSIS — M26.609 UNSPECIFIED TEMPOROMANDIBULAR JOINT DISORDER: ICD-10-CM

## 2023-10-06 DIAGNOSIS — R09.81 NASAL CONGESTION: ICD-10-CM

## 2023-10-06 DIAGNOSIS — H69.93 UNSPECIFIED EUSTACHIAN TUBE DISORDER, BILATERAL: ICD-10-CM

## 2023-10-06 DIAGNOSIS — H61.23 IMPACTED CERUMEN, BILATERAL: ICD-10-CM

## 2023-10-06 DIAGNOSIS — H92.02 OTALGIA, LEFT EAR: ICD-10-CM

## 2023-10-06 DIAGNOSIS — F17.200 NICOTINE DEPENDENCE, UNSPECIFIED, UNCOMPLICATED: ICD-10-CM

## 2023-10-06 DIAGNOSIS — J30.9 ALLERGIC RHINITIS, UNSPECIFIED: ICD-10-CM

## 2023-10-06 PROCEDURE — 31231 NASAL ENDOSCOPY DX: CPT

## 2023-10-06 PROCEDURE — 99215 OFFICE O/P EST HI 40 MIN: CPT | Mod: 25

## 2023-10-06 RX ORDER — FLUTICASONE PROPIONATE 50 UG/1
50 SPRAY, METERED NASAL DAILY
Qty: 1 | Refills: 0 | Status: ACTIVE | COMMUNITY
Start: 2023-10-06 | End: 1900-01-01

## 2023-10-17 NOTE — BRIEF OPERATIVE NOTE - TYPE OF ANESTHESIA
General Detail Level: Detailed Depth Of Biopsy: dermis Was A Bandage Applied: Yes Size Of Lesion In Cm: 0.3 X Size Of Lesion In Cm: 0 Biopsy Type: H and E Biopsy Method: Dermablade Regional Anesthesia Type: 0.5% lidocaine with 1:200,000 epinephrine and a 1:10 solution of 4.2% sodium bicarbonate Anesthesia Volume In Cc (Will Not Render If 0): 0.5 Hemostasis: Drysol Wound Care: Petrolatum Dressing: bandage Destruction After The Procedure: No Type Of Destruction Used: Curettage Curettage Text: The wound bed was treated with curettage after the biopsy was performed. Cryotherapy Text: The wound bed was treated with cryotherapy after the biopsy was performed. Electrodesiccation Text: The wound bed was treated with electrodesiccation after the biopsy was performed. Electrodesiccation And Curettage Text: The wound bed was treated with electrodesiccation and curettage after the biopsy was performed. Silver Nitrate Text: The wound bed was treated with silver nitrate after the biopsy was performed. Lab: -[id number] Consent: Written consent was obtained and risks were reviewed including but not limited to scarring, infection, bleeding, scabbing, incomplete removal, nerve damage and allergy to anesthesia. Post-Care Instructions: I reviewed with the patient in detail post-care instructions. Patient is to keep the biopsy site dry overnight, and then apply bacitracin twice daily until healed. Patient may apply hydrogen peroxide soaks to remove any crusting. Notification Instructions: Patient will be notified of biopsy results. However, patient instructed to call the office if not contacted within 2 weeks. Billing Type: Third-Party Bill Information: Selecting Yes will display possible errors in your note based on the variables you have selected. This validation is only offered as a suggestion for you. PLEASE NOTE THAT THE VALIDATION TEXT WILL BE REMOVED WHEN YOU FINALIZE YOUR NOTE. IF YOU WANT TO FAX A PRELIMINARY NOTE YOU WILL NEED TO TOGGLE THIS TO 'NO' IF YOU DO NOT WANT IT IN YOUR FAXED NOTE. Size Of Lesion In Cm: 0.2 Hemostasis: Aluminum Chloride and Electrocautery consent was obtained and risks were reviewed including but not limited to scarring, infection, bleeding, scabbing, incomplete removal, nerve damage and allergy to anesthesia.

## 2023-10-25 ENCOUNTER — APPOINTMENT (OUTPATIENT)
Dept: UROGYNECOLOGY | Facility: CLINIC | Age: 63
End: 2023-10-25
Payer: MEDICARE

## 2023-10-25 VITALS
OXYGEN SATURATION: 98 % | BODY MASS INDEX: 26.89 KG/M2 | WEIGHT: 147 LBS | DIASTOLIC BLOOD PRESSURE: 80 MMHG | HEART RATE: 70 BPM | SYSTOLIC BLOOD PRESSURE: 129 MMHG

## 2023-10-25 DIAGNOSIS — F41.8 OTHER SPECIFIED ANXIETY DISORDERS: ICD-10-CM

## 2023-10-25 DIAGNOSIS — N39.8 OTHER SPECIFIED DISORDERS OF URINARY SYSTEM: ICD-10-CM

## 2023-10-25 DIAGNOSIS — F17.200 NICOTINE DEPENDENCE, UNSPECIFIED, UNCOMPLICATED: ICD-10-CM

## 2023-10-25 DIAGNOSIS — N32.81 OVERACTIVE BLADDER: ICD-10-CM

## 2023-10-25 PROCEDURE — 51701 INSERT BLADDER CATHETER: CPT

## 2023-10-25 PROCEDURE — 99215 OFFICE O/P EST HI 40 MIN: CPT | Mod: 25

## 2023-10-26 ENCOUNTER — APPOINTMENT (OUTPATIENT)
Dept: GASTROENTEROLOGY | Facility: CLINIC | Age: 63
End: 2023-10-26
Payer: MEDICARE

## 2023-10-26 VITALS
RESPIRATION RATE: 15 BRPM | OXYGEN SATURATION: 98 % | SYSTOLIC BLOOD PRESSURE: 115 MMHG | HEART RATE: 86 BPM | TEMPERATURE: 98.3 F | WEIGHT: 152 LBS | BODY MASS INDEX: 27.97 KG/M2 | DIASTOLIC BLOOD PRESSURE: 70 MMHG | HEIGHT: 62 IN

## 2023-10-26 DIAGNOSIS — Z87.898 PERSONAL HISTORY OF OTHER SPECIFIED CONDITIONS: ICD-10-CM

## 2023-10-26 LAB
BILIRUB UR QL STRIP: NORMAL
GLUCOSE UR-MCNC: NORMAL
HCG UR QL: NORMAL EU/DL
HGB UR QL STRIP.AUTO: NORMAL
KETONES UR-MCNC: NORMAL
LEUKOCYTE ESTERASE UR QL STRIP: NORMAL
NITRITE UR QL STRIP: NORMAL
PH UR STRIP: 6.5
PROT UR STRIP-MCNC: NORMAL
SP GR UR STRIP: 1.01

## 2023-10-26 PROCEDURE — 99214 OFFICE O/P EST MOD 30 MIN: CPT

## 2023-10-27 ENCOUNTER — APPOINTMENT (OUTPATIENT)
Dept: GASTROENTEROLOGY | Facility: CLINIC | Age: 63
End: 2023-10-27
Payer: MEDICARE

## 2023-10-27 PROCEDURE — 97802 MEDICAL NUTRITION INDIV IN: CPT | Mod: 95

## 2023-11-08 ENCOUNTER — APPOINTMENT (OUTPATIENT)
Dept: UROGYNECOLOGY | Facility: CLINIC | Age: 63
End: 2023-11-08
Payer: MEDICARE

## 2023-11-08 ENCOUNTER — APPOINTMENT (OUTPATIENT)
Dept: UROGYNECOLOGY | Facility: CLINIC | Age: 63
End: 2023-11-08

## 2023-11-08 VITALS
DIASTOLIC BLOOD PRESSURE: 84 MMHG | HEART RATE: 72 BPM | SYSTOLIC BLOOD PRESSURE: 136 MMHG | BODY MASS INDEX: 27.6 KG/M2 | HEIGHT: 62 IN | WEIGHT: 150 LBS

## 2023-11-08 DIAGNOSIS — M62.89 OTHER SPECIFIED DISORDERS OF MUSCLE: ICD-10-CM

## 2023-11-08 PROCEDURE — 99213 OFFICE O/P EST LOW 20 MIN: CPT

## 2023-11-09 DIAGNOSIS — Z83.79 FAMILY HISTORY OF OTHER DISEASES OF THE DIGESTIVE SYSTEM: ICD-10-CM

## 2023-11-09 DIAGNOSIS — Z87.39 PERSONAL HISTORY OF OTHER DISEASES OF THE MUSCULOSKELETAL SYSTEM AND CONNECTIVE TISSUE: ICD-10-CM

## 2023-11-09 DIAGNOSIS — M77.8 OTHER ENTHESOPATHIES, NOT ELSEWHERE CLASSIFIED: ICD-10-CM

## 2023-11-09 DIAGNOSIS — Z86.39 PERSONAL HISTORY OF OTHER ENDOCRINE, NUTRITIONAL AND METABOLIC DISEASE: ICD-10-CM

## 2023-11-09 DIAGNOSIS — Z87.19 PERSONAL HISTORY OF OTHER DISEASES OF THE DIGESTIVE SYSTEM: ICD-10-CM

## 2023-11-09 RX ORDER — UBROGEPANT 100 MG/1
100 TABLET ORAL
Refills: 0 | Status: ACTIVE | COMMUNITY

## 2023-11-09 RX ORDER — CHOLECALCIFEROL (VITAMIN D3) 25 MCG
TABLET ORAL
Refills: 0 | Status: ACTIVE | COMMUNITY

## 2023-11-09 RX ORDER — PNV NO.95/FERROUS FUM/FOLIC AC 28MG-0.8MG
TABLET ORAL
Refills: 0 | Status: ACTIVE | COMMUNITY

## 2023-11-09 RX ORDER — LEVOCETIRIZINE DIHYDROCHLORIDE 5 MG/1
5 TABLET ORAL
Refills: 0 | Status: ACTIVE | COMMUNITY

## 2023-11-09 RX ORDER — SENNOSIDES 8.6 MG TABLETS 8.6 MG/1
8.6 TABLET ORAL
Refills: 0 | Status: ACTIVE | COMMUNITY

## 2023-11-09 RX ORDER — TRAZODONE HYDROCHLORIDE 100 MG/1
100 TABLET ORAL
Refills: 0 | Status: ACTIVE | COMMUNITY

## 2023-12-14 ENCOUNTER — APPOINTMENT (OUTPATIENT)
Dept: GASTROENTEROLOGY | Facility: CLINIC | Age: 63
End: 2023-12-14
Payer: MEDICARE

## 2023-12-14 PROCEDURE — 99215 OFFICE O/P EST HI 40 MIN: CPT | Mod: 95

## 2023-12-14 RX ORDER — FAMOTIDINE 40 MG/1
40 TABLET, FILM COATED ORAL
Qty: 30 | Refills: 3 | Status: ACTIVE | COMMUNITY
Start: 2023-12-14 | End: 1900-01-01

## 2023-12-14 RX ORDER — LINACLOTIDE 290 UG/1
290 CAPSULE, GELATIN COATED ORAL DAILY
Qty: 30 | Refills: 1 | Status: ACTIVE | COMMUNITY
Start: 2023-12-14 | End: 1900-01-01

## 2023-12-17 NOTE — HISTORY OF PRESENT ILLNESS
[Home] : at home, [unfilled] , at the time of the visit. 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 Saab), bi-polar disorder, endometriosis, depression/anxiety, previously followed with Dr. Titus, now here for follow up for constipation/ bloating.  Constipation started about 5 years ago. Treated with Linzess 290 and Motegrity 2 and then added Trulance at night. She worked with pelvic floor PT. Motegrity was later D/C'd due to anxiety exacerbation. In 2022 she underwent RA LAR suture rectopexy with Dr. Rubio for repair of rectal prolapse. Initially her constipation improved post surgery. However, symptoms have returned. She currently takes Linzess 290 most days, which elicits a BM. However, she does not take daily because it causes diarrhea, so avoids on days she leaves the house. C/o weight gain and exercise intolerance due to orthopedic injuries  She feels one dose of 290mcg is ok. She does it as much as she can. The days she works can't take it. Stopped motegrity. Takes linzess a few days a week. Works Friday, , Monday and can't take it. When she goes in early etc can't take it. Does need 290mcg otherwise won't work enough. It is difficult to manage the constipation.  Saw an herbalist and started taking some herbs. Her goal is to get off of the linzess. Due to surgeries and lack of exercise, constipation got bad. She had stomach surgery last year.   Met with dietician that recommended low FODMAP which she doesn't want to do again. Has certain triggers like fried chickpeas that she avoids.   PMHx- anxiety, hld PSHx- orthopedic - knee, hip, hand, spine; Rectal prolapse Rx- trazodone prn, xanax prn, omeprazole NSAIDs denies FHx- mother with colon cancer,  age 75 Allergies- no ETOH- rarely Smoking- 1/2 ppd Drugs- no Diet- vegan  HPylori negative, TSH wnl  Imaging- CT abd with constipation 2019  EGD-  1) 3/2021 w/ Dr. Marisol Saab, resolution of antral ulcer, no esophagitis, gastritis, or duodenitis  2) 2019 w/ Dr. Marisol Saab - sliding hiatal hernia - multiple linear erosions in the esophagus consistent with esophagitis - single 3 mm ulcer found in the antrum of the stomach - localized erythema compatible with gastritis  Colonoscopy- 2019 - few non-bleeding diverticula in the sigmoid colon - single 3 mm non-bleeding polyps in the sigmoid colon, resected and retrieved - small internal hemorrhoids.

## 2023-12-17 NOTE — ASSESSMENT
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 Saab), bi-polar disorder, endometriosis, depression/anxiety, previously followed with Dr. Titus, now a patient of Josesito/Pineda who presents for follow up for constipation/ bloating.  #Constipation #Bloating - Continue Linzess, feels 290 every other day is working for her - Would hold off on Motegrity due to side effect of increasing anxiety - Could consider IBSrela in future if Linzess is ineffective/ diarrheal side effects - Plan to resume pelvic floor PT, can't do it now -  has not yet followed up with dietician, previously referred - Avoid carbonated beverages, chewing gum, sucking on hard candies, straws, eating quickly - Following up with urogyn  Acid reflux - does not want to take pantoprazole which helps - wants to switch to pepcid  #Colon cancer screening Fhx CRC in mother, due for screening every 5 years Last colonoscopy 12/2019 - Repeat colonoscopy 12/2024  Follow up in 3 mos

## 2023-12-19 ENCOUNTER — EMERGENCY (EMERGENCY)
Facility: HOSPITAL | Age: 63
LOS: 1 days | Discharge: HOME | End: 2023-12-19
Attending: EMERGENCY MEDICINE
Payer: COMMERCIAL

## 2023-12-19 VITALS
RESPIRATION RATE: 18 BRPM | OXYGEN SATURATION: 95 % | HEART RATE: 82 BPM | SYSTOLIC BLOOD PRESSURE: 115 MMHG | DIASTOLIC BLOOD PRESSURE: 78 MMHG | TEMPERATURE: 98 F

## 2023-12-19 VITALS
DIASTOLIC BLOOD PRESSURE: 73 MMHG | OXYGEN SATURATION: 95 % | HEIGHT: 62 IN | HEART RATE: 86 BPM | WEIGHT: 149.91 LBS | SYSTOLIC BLOOD PRESSURE: 129 MMHG | RESPIRATION RATE: 18 BRPM | TEMPERATURE: 98 F

## 2023-12-19 DIAGNOSIS — Z98.890 OTHER SPECIFIED POSTPROCEDURAL STATES: Chronic | ICD-10-CM

## 2023-12-19 DIAGNOSIS — Z98.89 OTHER SPECIFIED POSTPROCEDURAL STATES: Chronic | ICD-10-CM

## 2023-12-19 LAB
ALBUMIN SERPL ELPH-MCNC: 3.2 G/DL — LOW (ref 3.5–5)
ALBUMIN SERPL ELPH-MCNC: 3.2 G/DL — LOW (ref 3.5–5)
ALP SERPL-CCNC: 83 U/L — SIGNIFICANT CHANGE UP (ref 40–120)
ALP SERPL-CCNC: 83 U/L — SIGNIFICANT CHANGE UP (ref 40–120)
ALT FLD-CCNC: 42 U/L DA — SIGNIFICANT CHANGE UP (ref 10–60)
ALT FLD-CCNC: 42 U/L DA — SIGNIFICANT CHANGE UP (ref 10–60)
ANION GAP SERPL CALC-SCNC: 4 MMOL/L — LOW (ref 5–17)
ANION GAP SERPL CALC-SCNC: 4 MMOL/L — LOW (ref 5–17)
AST SERPL-CCNC: 45 U/L — HIGH (ref 10–40)
AST SERPL-CCNC: 45 U/L — HIGH (ref 10–40)
BASOPHILS # BLD AUTO: 0.02 K/UL — SIGNIFICANT CHANGE UP (ref 0–0.2)
BASOPHILS # BLD AUTO: 0.02 K/UL — SIGNIFICANT CHANGE UP (ref 0–0.2)
BASOPHILS NFR BLD AUTO: 0.3 % — SIGNIFICANT CHANGE UP (ref 0–2)
BASOPHILS NFR BLD AUTO: 0.3 % — SIGNIFICANT CHANGE UP (ref 0–2)
BILIRUB SERPL-MCNC: 0.3 MG/DL — SIGNIFICANT CHANGE UP (ref 0.2–1.2)
BILIRUB SERPL-MCNC: 0.3 MG/DL — SIGNIFICANT CHANGE UP (ref 0.2–1.2)
BUN SERPL-MCNC: 8 MG/DL — SIGNIFICANT CHANGE UP (ref 7–18)
BUN SERPL-MCNC: 8 MG/DL — SIGNIFICANT CHANGE UP (ref 7–18)
CALCIUM SERPL-MCNC: 8.8 MG/DL — SIGNIFICANT CHANGE UP (ref 8.4–10.5)
CALCIUM SERPL-MCNC: 8.8 MG/DL — SIGNIFICANT CHANGE UP (ref 8.4–10.5)
CHLORIDE SERPL-SCNC: 104 MMOL/L — SIGNIFICANT CHANGE UP (ref 96–108)
CHLORIDE SERPL-SCNC: 104 MMOL/L — SIGNIFICANT CHANGE UP (ref 96–108)
CO2 SERPL-SCNC: 27 MMOL/L — SIGNIFICANT CHANGE UP (ref 22–31)
CO2 SERPL-SCNC: 27 MMOL/L — SIGNIFICANT CHANGE UP (ref 22–31)
CREAT SERPL-MCNC: 0.96 MG/DL — SIGNIFICANT CHANGE UP (ref 0.5–1.3)
CREAT SERPL-MCNC: 0.96 MG/DL — SIGNIFICANT CHANGE UP (ref 0.5–1.3)
D DIMER BLD IA.RAPID-MCNC: 174 NG/ML DDU — SIGNIFICANT CHANGE UP
D DIMER BLD IA.RAPID-MCNC: 174 NG/ML DDU — SIGNIFICANT CHANGE UP
EGFR: 67 ML/MIN/1.73M2 — SIGNIFICANT CHANGE UP
EGFR: 67 ML/MIN/1.73M2 — SIGNIFICANT CHANGE UP
EOSINOPHIL # BLD AUTO: 0.02 K/UL — SIGNIFICANT CHANGE UP (ref 0–0.5)
EOSINOPHIL # BLD AUTO: 0.02 K/UL — SIGNIFICANT CHANGE UP (ref 0–0.5)
EOSINOPHIL NFR BLD AUTO: 0.3 % — SIGNIFICANT CHANGE UP (ref 0–6)
EOSINOPHIL NFR BLD AUTO: 0.3 % — SIGNIFICANT CHANGE UP (ref 0–6)
FLUAV H3 RNA SPEC QL NAA+PROBE: DETECTED
FLUAV H3 RNA SPEC QL NAA+PROBE: DETECTED
GLUCOSE SERPL-MCNC: 108 MG/DL — HIGH (ref 70–99)
GLUCOSE SERPL-MCNC: 108 MG/DL — HIGH (ref 70–99)
HCT VFR BLD CALC: 42.4 % — SIGNIFICANT CHANGE UP (ref 34.5–45)
HCT VFR BLD CALC: 42.4 % — SIGNIFICANT CHANGE UP (ref 34.5–45)
HGB BLD-MCNC: 13.5 G/DL — SIGNIFICANT CHANGE UP (ref 11.5–15.5)
HGB BLD-MCNC: 13.5 G/DL — SIGNIFICANT CHANGE UP (ref 11.5–15.5)
IMM GRANULOCYTES NFR BLD AUTO: 0.3 % — SIGNIFICANT CHANGE UP (ref 0–0.9)
IMM GRANULOCYTES NFR BLD AUTO: 0.3 % — SIGNIFICANT CHANGE UP (ref 0–0.9)
LIDOCAIN IGE QN: 27 U/L — SIGNIFICANT CHANGE UP (ref 13–75)
LIDOCAIN IGE QN: 27 U/L — SIGNIFICANT CHANGE UP (ref 13–75)
LYMPHOCYTES # BLD AUTO: 1.26 K/UL — SIGNIFICANT CHANGE UP (ref 1–3.3)
LYMPHOCYTES # BLD AUTO: 1.26 K/UL — SIGNIFICANT CHANGE UP (ref 1–3.3)
LYMPHOCYTES # BLD AUTO: 21.4 % — SIGNIFICANT CHANGE UP (ref 13–44)
LYMPHOCYTES # BLD AUTO: 21.4 % — SIGNIFICANT CHANGE UP (ref 13–44)
MCHC RBC-ENTMCNC: 28 PG — SIGNIFICANT CHANGE UP (ref 27–34)
MCHC RBC-ENTMCNC: 28 PG — SIGNIFICANT CHANGE UP (ref 27–34)
MCHC RBC-ENTMCNC: 31.8 GM/DL — LOW (ref 32–36)
MCHC RBC-ENTMCNC: 31.8 GM/DL — LOW (ref 32–36)
MCV RBC AUTO: 87.8 FL — SIGNIFICANT CHANGE UP (ref 80–100)
MCV RBC AUTO: 87.8 FL — SIGNIFICANT CHANGE UP (ref 80–100)
MONOCYTES # BLD AUTO: 0.68 K/UL — SIGNIFICANT CHANGE UP (ref 0–0.9)
MONOCYTES # BLD AUTO: 0.68 K/UL — SIGNIFICANT CHANGE UP (ref 0–0.9)
MONOCYTES NFR BLD AUTO: 11.5 % — SIGNIFICANT CHANGE UP (ref 2–14)
MONOCYTES NFR BLD AUTO: 11.5 % — SIGNIFICANT CHANGE UP (ref 2–14)
NEUTROPHILS # BLD AUTO: 3.9 K/UL — SIGNIFICANT CHANGE UP (ref 1.8–7.4)
NEUTROPHILS # BLD AUTO: 3.9 K/UL — SIGNIFICANT CHANGE UP (ref 1.8–7.4)
NEUTROPHILS NFR BLD AUTO: 66.2 % — SIGNIFICANT CHANGE UP (ref 43–77)
NEUTROPHILS NFR BLD AUTO: 66.2 % — SIGNIFICANT CHANGE UP (ref 43–77)
NRBC # BLD: 0 /100 WBCS — SIGNIFICANT CHANGE UP (ref 0–0)
NRBC # BLD: 0 /100 WBCS — SIGNIFICANT CHANGE UP (ref 0–0)
NT-PROBNP SERPL-SCNC: 21 PG/ML — SIGNIFICANT CHANGE UP (ref 0–125)
NT-PROBNP SERPL-SCNC: 21 PG/ML — SIGNIFICANT CHANGE UP (ref 0–125)
PLATELET # BLD AUTO: 191 K/UL — SIGNIFICANT CHANGE UP (ref 150–400)
PLATELET # BLD AUTO: 191 K/UL — SIGNIFICANT CHANGE UP (ref 150–400)
POTASSIUM SERPL-MCNC: 3.8 MMOL/L — SIGNIFICANT CHANGE UP (ref 3.5–5.3)
POTASSIUM SERPL-MCNC: 3.8 MMOL/L — SIGNIFICANT CHANGE UP (ref 3.5–5.3)
POTASSIUM SERPL-SCNC: 3.8 MMOL/L — SIGNIFICANT CHANGE UP (ref 3.5–5.3)
POTASSIUM SERPL-SCNC: 3.8 MMOL/L — SIGNIFICANT CHANGE UP (ref 3.5–5.3)
PROT SERPL-MCNC: 7.2 G/DL — SIGNIFICANT CHANGE UP (ref 6–8.3)
PROT SERPL-MCNC: 7.2 G/DL — SIGNIFICANT CHANGE UP (ref 6–8.3)
RAPID RVP RESULT: DETECTED
RAPID RVP RESULT: DETECTED
RBC # BLD: 4.83 M/UL — SIGNIFICANT CHANGE UP (ref 3.8–5.2)
RBC # BLD: 4.83 M/UL — SIGNIFICANT CHANGE UP (ref 3.8–5.2)
RBC # FLD: 14.9 % — HIGH (ref 10.3–14.5)
RBC # FLD: 14.9 % — HIGH (ref 10.3–14.5)
SARS-COV-2 RNA SPEC QL NAA+PROBE: SIGNIFICANT CHANGE UP
SARS-COV-2 RNA SPEC QL NAA+PROBE: SIGNIFICANT CHANGE UP
SODIUM SERPL-SCNC: 135 MMOL/L — SIGNIFICANT CHANGE UP (ref 135–145)
SODIUM SERPL-SCNC: 135 MMOL/L — SIGNIFICANT CHANGE UP (ref 135–145)
TROPONIN I, HIGH SENSITIVITY RESULT: 6.9 NG/L — SIGNIFICANT CHANGE UP
TROPONIN I, HIGH SENSITIVITY RESULT: 6.9 NG/L — SIGNIFICANT CHANGE UP
WBC # BLD: 5.9 K/UL — SIGNIFICANT CHANGE UP (ref 3.8–10.5)
WBC # BLD: 5.9 K/UL — SIGNIFICANT CHANGE UP (ref 3.8–10.5)
WBC # FLD AUTO: 5.9 K/UL — SIGNIFICANT CHANGE UP (ref 3.8–10.5)
WBC # FLD AUTO: 5.9 K/UL — SIGNIFICANT CHANGE UP (ref 3.8–10.5)

## 2023-12-19 PROCEDURE — 85025 COMPLETE CBC W/AUTO DIFF WBC: CPT

## 2023-12-19 PROCEDURE — 96374 THER/PROPH/DIAG INJ IV PUSH: CPT

## 2023-12-19 PROCEDURE — 99285 EMERGENCY DEPT VISIT HI MDM: CPT | Mod: 25

## 2023-12-19 PROCEDURE — 80053 COMPREHEN METABOLIC PANEL: CPT

## 2023-12-19 PROCEDURE — 85379 FIBRIN DEGRADATION QUANT: CPT

## 2023-12-19 PROCEDURE — 71046 X-RAY EXAM CHEST 2 VIEWS: CPT | Mod: 26

## 2023-12-19 PROCEDURE — 83880 ASSAY OF NATRIURETIC PEPTIDE: CPT

## 2023-12-19 PROCEDURE — 0225U NFCT DS DNA&RNA 21 SARSCOV2: CPT

## 2023-12-19 PROCEDURE — 84484 ASSAY OF TROPONIN QUANT: CPT

## 2023-12-19 PROCEDURE — 71046 X-RAY EXAM CHEST 2 VIEWS: CPT

## 2023-12-19 PROCEDURE — 83690 ASSAY OF LIPASE: CPT

## 2023-12-19 PROCEDURE — 96375 TX/PRO/DX INJ NEW DRUG ADDON: CPT

## 2023-12-19 PROCEDURE — 93005 ELECTROCARDIOGRAM TRACING: CPT

## 2023-12-19 PROCEDURE — 36415 COLL VENOUS BLD VENIPUNCTURE: CPT

## 2023-12-19 RX ORDER — FAMOTIDINE 10 MG/ML
20 INJECTION INTRAVENOUS ONCE
Refills: 0 | Status: COMPLETED | OUTPATIENT
Start: 2023-12-19 | End: 2023-12-19

## 2023-12-19 RX ORDER — SODIUM CHLORIDE 9 MG/ML
1000 INJECTION INTRAMUSCULAR; INTRAVENOUS; SUBCUTANEOUS ONCE
Refills: 0 | Status: COMPLETED | OUTPATIENT
Start: 2023-12-19 | End: 2023-12-19

## 2023-12-19 RX ORDER — BENZOCAINE AND MENTHOL 5; 1 G/100ML; G/100ML
1 LIQUID ORAL
Qty: 1 | Refills: 0
Start: 2023-12-19

## 2023-12-19 RX ORDER — BENZOCAINE AND MENTHOL 5; 1 G/100ML; G/100ML
1 LIQUID ORAL ONCE
Refills: 0 | Status: DISCONTINUED | OUTPATIENT
Start: 2023-12-19 | End: 2023-12-22

## 2023-12-19 RX ORDER — METOCLOPRAMIDE HCL 10 MG
10 TABLET ORAL ONCE
Refills: 0 | Status: COMPLETED | OUTPATIENT
Start: 2023-12-19 | End: 2023-12-19

## 2023-12-19 RX ORDER — DIPHENHYDRAMINE HCL 50 MG
25 CAPSULE ORAL ONCE
Refills: 0 | Status: COMPLETED | OUTPATIENT
Start: 2023-12-19 | End: 2023-12-19

## 2023-12-19 RX ORDER — KETOROLAC TROMETHAMINE 30 MG/ML
15 SYRINGE (ML) INJECTION ONCE
Refills: 0 | Status: DISCONTINUED | OUTPATIENT
Start: 2023-12-19 | End: 2023-12-19

## 2023-12-19 RX ADMIN — SODIUM CHLORIDE 1000 MILLILITER(S): 9 INJECTION INTRAMUSCULAR; INTRAVENOUS; SUBCUTANEOUS at 08:49

## 2023-12-19 RX ADMIN — Medication 15 MILLIGRAM(S): at 08:49

## 2023-12-19 RX ADMIN — Medication 104 MILLIGRAM(S): at 08:49

## 2023-12-19 RX ADMIN — FAMOTIDINE 20 MILLIGRAM(S): 10 INJECTION INTRAVENOUS at 08:48

## 2023-12-19 RX ADMIN — Medication 25 MILLIGRAM(S): at 08:48

## 2023-12-19 NOTE — ED PROVIDER NOTE - PATIENT PORTAL LINK FT
You can access the FollowMyHealth Patient Portal offered by [facility] by registering at the following website: [email or web address]. By joining [employer]’s FollowMyHealth portal, you will also be able to view your health information using other applications (apps) compatible with our system. You can access the FollowMyHealth Patient Portal offered by [facility] by registering at the following website: [email or web address]. By joining [employer]’s FollowMyHealth portal, you will also be able to view your health information using other applications (apps) compatible with our system.

## 2023-12-19 NOTE — ED ADULT TRIAGE NOTE - CHIEF COMPLAINT QUOTE
Pt stated she tested negative last Saturday with home test for covid stated she has been coughing so much she had upper stomach pain

## 2023-12-19 NOTE — ED PROVIDER NOTE - PHYSICAL EXAMINATION
Heart regular lungs clear abdomen soft nontender tenderness to palpation to the lower rib margin.  No palpable masses no hernias.  Speaking full sentences no lymphadenopathy.  No leg swelling.Supple neck no rash no petechiae awake alert oriented x 3.

## 2023-12-19 NOTE — ED ADULT NURSE NOTE - OBJECTIVE STATEMENT
Patient presents to ED with complaint of cough x 2 weeks. Patient tested Positive for COVID-19 when symptoms began. States she tested negative with an at home test. Went to work the following day and got home not feeling well.

## 2023-12-19 NOTE — ED PROVIDER NOTE - NSFOLLOWUPINSTRUCTIONS_ED_ALL_ED_FT
Cough, Adult  Coughing is a reflex that clears your throat and airways (respiratory system). It helps heal and protect your lungs. It is normal to cough from time to time. A cough that happens with other symptoms or that lasts a long time may be a sign of a condition that needs treatment. A short-term (acute) cough may only last 2–3 weeks. A long-term (chronic) cough may last 8 or more weeks.    Coughing is often caused by:  Diseases, such as:  An infection of the respiratory system.  Asthma or other heart or lung diseases.  Gastroesophageal reflux. This is when acid comes back up from the stomach.  Breathing in things that irritate your lungs.  Allergies.  Postnasal drip. This is when mucus runs down the back of your throat.  Smoking.  Some medicines.  Follow these instructions at home:  Medicines    Take over-the-counter and prescription medicines only as told by your health care provider.  Talk with your provider before you take cough medicine (cough suppressants).  Eating and drinking    Do not drink alcohol.  Avoid caffeine.  Drink enough fluid to keep your pee (urine) pale yellow.  Lifestyle    Avoid cigarette smoke.  Do not use any products that contain nicotine or tobacco. These products include cigarettes, chewing tobacco, and vaping devices, such as e-cigarettes. If you need help quitting, ask your provider.  Avoid things that make you cough. These may include perfumes, candles, cleaning products, or campfire smoke.  General instructions    A person holding a cloth over the mouth and nose while coughing.  Watch for any changes to your cough. Tell your provider about them.  Always cover your mouth when you cough.  If the air is dry in your bedroom or home, use a cool mist vaporizer or humidifier.  If your cough is worse at night, try to sleep in a semi-upright position.  Rest as needed.  Contact a health care provider if:  You have new symptoms, or your symptoms get worse.  You cough up pus.  You have a fever that does not go away or a cough that does not get better after 2–3 weeks.  You cannot control your cough with medicine, and you are losing sleep.  You have pain that gets worse or is not helped with medicine.  You lose weight for no clear reason.  You have night sweats.  Get help right away if:  You cough up blood.  You have trouble breathing.  Your heart is beating very fast.  These symptoms may be an emergency. Get help right away. Call 911.  Do not wait to see if the symptoms will go away.  Do not drive yourself to the hospital.  This information is not intended to replace advice given to you by your health care provider. Make sure you discuss any questions you have with your health care provider.    Document Revised: 08/18/2023 Document Reviewed: 08/18/2023

## 2023-12-19 NOTE — ED ADULT TRIAGE NOTE - AS PAIN REST
[FreeTextEntry1] : Ms. Gibson is a 61 year-old female with significant past medical history and bilateral heel decubitus. She is ambulates with a brace to right lower extremity due to right foot drop. She currently resides in a nursing home and is undergoing physical therapy for gait training and transfers. She needs to have her brace in place on a daily basis and work with PT to improve her transfers.  8 (severe pain)

## 2023-12-19 NOTE — ED PROVIDER NOTE - OBJECTIVE STATEMENT
.63-year-old female history of hyperlipidemia presents with positive COVID test at home 2 weeks ago.  She then started feeling better went back to work about a week ago.  She then started having worsening cough again and has been coughing persistently and is now having lower rib cage pain with cough.  She also notes headache for the last 5 days not improving.  She has history of migraine.  She had fever of 100 about 3 days ago cough is dry nonproductive but is irritating prompting her to continuously cough.  She also felt she had a protrusion in the lower rib cage area became concerned and came to the ER.

## 2023-12-19 NOTE — ED ADULT NURSE NOTE - NSFALLUNIVINTERV_ED_ALL_ED
Bed/Stretcher in lowest position, wheels locked, appropriate side rails in place/Call bell, personal items and telephone in reach/Instruct patient to call for assistance before getting out of bed/chair/stretcher/Non-slip footwear applied when patient is off stretcher/[place] to call system/Physically safe environment - no spills, clutter or unnecessary equipment/Purposeful proactive rounding/Room/bathroom lighting operational, light cord in reach Bed/Stretcher in lowest position, wheels locked, appropriate side rails in place/Call bell, personal items and telephone in reach/Instruct patient to call for assistance before getting out of bed/chair/stretcher/Non-slip footwear applied when patient is off stretcher/[place] to call system/Physically safe environment - no spills, clutter or unnecessary equipment/Purposeful proactive rounding/Room/bathroom lighting operational, light cord in reach

## 2023-12-19 NOTE — ED PROVIDER NOTE - CLINICAL SUMMARY MEDICAL DECISION MAKING FREE TEXT BOX
Patient with persistent cough and fever several days ago.  Well-appearing on exam with tenderness to the lower rib margin.  Symptoms likely due to persistent cough and resultant musculoskeletal pain will do x-ray labs migraine cocktail reassess.  Patient also notes she has been taking Robitussin and benzoate with no relief.

## 2024-01-04 ENCOUNTER — APPOINTMENT (OUTPATIENT)
Dept: OBGYN | Facility: CLINIC | Age: 64
End: 2024-01-04

## 2024-01-08 ENCOUNTER — NON-APPOINTMENT (OUTPATIENT)
Age: 64
End: 2024-01-08

## 2024-01-08 NOTE — ED ADULT NURSE NOTE - NSHOSCREENINGQ1_ED_ALL_ED
From: González Billy  To: Dr. Karie Moreno  Sent: 1/8/2024 11:31 AM CST  Subject: González tonsils    Hello.   Yesterday, I noticed González tonsils were extremely enlarged. As in enlarged to the point they are almost touching. He has not been complaining about his throat hurting; however, he did start to develop a rash yesterday. I did see where you all did not have any appointments available today when I attempt to schedule [facility] [place]. He has had a low grade fever.   No

## 2024-01-10 ENCOUNTER — APPOINTMENT (OUTPATIENT)
Dept: GASTROENTEROLOGY | Facility: CLINIC | Age: 64
End: 2024-01-10

## 2024-01-18 ENCOUNTER — APPOINTMENT (OUTPATIENT)
Dept: SURGERY | Facility: CLINIC | Age: 64
End: 2024-01-18
Payer: MEDICARE

## 2024-01-18 VITALS
HEART RATE: 91 BPM | HEIGHT: 62 IN | SYSTOLIC BLOOD PRESSURE: 120 MMHG | BODY MASS INDEX: 28.16 KG/M2 | WEIGHT: 153 LBS | DIASTOLIC BLOOD PRESSURE: 79 MMHG | OXYGEN SATURATION: 99 %

## 2024-01-18 PROCEDURE — 99214 OFFICE O/P EST MOD 30 MIN: CPT

## 2024-01-19 LAB
ANION GAP SERPL CALC-SCNC: 11 MMOL/L
BUN SERPL-MCNC: 12 MG/DL
CALCIUM SERPL-MCNC: 10 MG/DL
CHLORIDE SERPL-SCNC: 104 MMOL/L
CO2 SERPL-SCNC: 26 MMOL/L
CREAT SERPL-MCNC: 0.94 MG/DL
EGFR: 69 ML/MIN/1.73M2
GLUCOSE SERPL-MCNC: 94 MG/DL
POTASSIUM SERPL-SCNC: 4.3 MMOL/L
SODIUM SERPL-SCNC: 141 MMOL/L

## 2024-01-23 ENCOUNTER — APPOINTMENT (OUTPATIENT)
Dept: GASTROENTEROLOGY | Facility: CLINIC | Age: 64
End: 2024-01-23

## 2024-01-23 NOTE — HISTORY OF PRESENT ILLNESS
NUTRITION ASSESSMENT     Assessment Type: Follow Up   Reason for Visit: Registered Dietitian Evaluation   Chart, Medications and Lab Results Reviewed: Yes  Nutrition Risk Factors: Enteral Nutrition   Food Allergies: no     Demographic/Anthropometrics Information   Gender: female   Birth Gestational Age: 38w3d  Day of Life: 38 Days  Corrected Gestational Age:43w6d    Birth Weight: 5 lb 7.8 oz (2490 g)  Birth HC: 13.19\"/33.5 cm Birth Length: 18.7\"/47.5 cm   %tile: 4  %tile: 37  %tile: 19    Z-score: -1.75  Z-score: -0.32  Z-score: -0.88    Goal: 0.79 kg/mo  Goal: 2.67 cm/mo  Goal: 4.46 cm/mo      Growth Curve Used: WHO     Current Weight:3445 g (21 2100)  Current HC: 35.5 cm Current Length: 20.08\" (51 cm) (21 1500)   %tile: 4 %tile: 13    %tile: 5     Z-score:  -1.70 Z-score: -1.15 Z-score: -1.67     Goal: 0.77 kg/mo (25.7 g/day)  Goal: 1.59 cm/mo  Goal: 3.13 cm/mo      Adequacy of Growth:   Weight Change Since Birth: 38%   Weight gain velocity 66% of reference standard (180 g/week) to maintain current percentile   Growth trends: Weight for age change Z-score (from birth): +0.05  Growth Analysis/Comments: Monitor overall growth.     Nutritional/Medical Pertinent Data: Pt s/p CDH repair on 3/2 and abdominal wall closure on 3/30.  Principal Problem:    Congenital diaphragmatic hernia  Active Problems:     infant of 38 completed weeks of gestation    Born by  section    Other respiratory distress of     Pulmonary hypoplasia    Sacral pit    Hypotension    Hyponatremia    Low serum cortisol level (CMS/HCC)    Hypocalcemia    Staphylococcus aureus bacteremia    Respiratory Support: on 1L O2 HFNC      Current Diet Order    Enteral/PO: Breast milk @ 64 mls q 3 hrs via NG to provide 149 mls/kg/day, 100 kcals/kg/day and 1.5 gm protein/kg/day  Med: precedex, fent    Nutrition Intake Analysis and Assessment:   Current nutrition prescription at 149 ml/kg/day provides 100 kcal/kg/day, and 1.5 gm  [FreeTextEntry1] : Ms. PHYLLIS MERRITT is a 63 year F with a history of bipolar, chronic constipation, cervical pain, acid reflux, endometriosis, chronic migraines and rectal prolapse here for a follow up visit. She originally saw Dr. Sanchez who recommended a abdominal rectal prolapse repair but she was doubtful of that recommendation is here for a second opinion because she wanted to know if it would be safer going through the rectum due to her prior surgeries for endometriosis. She has a history of chronic constipation and was taking Linzess and motrgrity twice a week (can't do it more often because it interferes with work then). On the other days she has been relying more on Herbal tea and Mineral oil. She has daily normal to soft BM, with occasional straining. She has a significant issue with having to strain to urinate each time. She has been having some issues with bloating as well. She has been Vegan for 5 years and has tried greek yogurt in the morning. She saw pelvic floor therapy in 2021 and has been doing breathing and Kegel exercises since (sounds like maybe she was diagnosed w/ some form of obstructive defecation in the past). Last colonoscopy (2019) revealed diverticulosis and 1 polyp.She underwent a MRI defecography on 9/19/2022 which revealed a small rectocele, rectal prolapse, mild vaginal prolapse and pelvic floor weakness. On 11/16/2022 she underwent an uneventful robotic anterior resection, suture rectopexy and flex sig. She had an uneventful post-op course and was discharged home on POD 3 having BMs and tolerating a diet. During 12/20/2022 visit, she was still on a LFD as fiber causes bloating and nausea. She is no longer taking Linzess or Motegrity and is able to have BMs regularly. She initially tried dulcolax which led to diarrhea and anal bleeding. She then tried MiraLAX and senna which did not help with constipation. She saw Dr. Granda who recommended MiraLAX BID, colace and Benefiber. She still has no recurrent rectal prolapse. She started seeing a nutritionist in January 2023. During 6/6/2023 visit, patient is complaining of continued straining with urination and is concerned that the constant straining will interfere with her rectopexy as well as the anxiety it causes her. She didn't get a chance to do pelvic floor PT after her rectopexy as she had other medical conditions that she was preoccupied with. She has since called to schedule an appointment but they don't have an appointment for a couple of months. She followed-up with Dr. Parr who diagnosed her with a rectocele, cystocele, uterovaginal prolapse. On 7/25/2023, patient was experiencing constipation and still has to strain to urinate. She has been taking senna without much relief and does not want to restart Linzess (was too strong for her). Dr. Parr recently prescribed solifenacin Succinate for OAB which she reports helps with the nocturia. Today patient is complaining of upper midline discomfort (a couple of cms superior to the midline incision) for the past 6 months and denies any aggravating factors such as movement. Tolerating diet and is being followed by GI Dr. Mackenzie for constipation and bloating. Denies signs and symptoms of rectal prolapse, nausea, vomiting, fevers/chills.  protein/kg/day   Adequacy of Intake: Meeting 91% estimated energy and 50% protein needs   Additional Information/Diet Tolerance: Tolerating feeds and with minimal emesis overnight. No po at this time due to poor tolerance-Speech is working with patient.    Estimated Nutritional Needs:   Energy: 110-120 kcal/kg/day   Protein: 3.0 g/kg/day   Fluid: per MD discretion     Current Nutrition Status: Moderately Compromised     NUTRITION DIAGNOSIS (PES STATEMENT)   Unable to Initiate Oral Feeding related to Other: medical status as evidenced by Other: Need for tubefeeding     Nutrition Plan   Current Nutrition Therapy: Enteral Feedings and Monitor Weight   Continue Nutrition Therapy: Yes   Monitor: Enteral/Parenteral Tolerance, Intake & Output, Lab Results and Weight   Discharge Needs: None   Care Plan Discussed With: Multidisciplinary Team     Goal : Meet 75 to 100% of estimated needs/all source nutrient intake   Goal Progress: Extended   Timeframe to achieve: By Discharge     Monitoring and Evaluation Criteria/Goals:   1) Continue with bolus NG feeds. Speech is working with patient on po feeds.  2) Feeding goal: 160-170 mls/kg/day Breast milk to provide 107-114 kcals/kg/day.  3) Suggest starting vitamin at 1 ml/day PVS with Fe for 400 IU /day vit D.  Will continue to monitor clinical progress/changes, nutrition tolerance, growth parameters     Malnutrition Status:   Unable to determine malnutrition status at this time per indicators

## 2024-01-23 NOTE — ASSESSMENT
[FreeTextEntry1] : Ms. PHYLLIS MERRITT is a 63 year F w/ a history of chronic constipation, rectal prolapse and pelvic floor dysfunction (weakness) s/p robotic anterior resection and suture rectopexy on 11/16/2022 presenting for abdominal pain.

## 2024-01-23 NOTE — PLAN
[TextEntry] : - With regards to her prior rectal prolapse patient is doing well and healed appropriately from surgery. Denies any recurrent rectal prolapse.  - Unclear the cause of her abdominal pain as it doesn't seem related to any of her incisions and no palpable hernia on exam. Ordered CT A/P ordered for abdominal pain. BMP for renal function.  - Maintain colonoscopies with her GI - RTC after imaging

## 2024-01-23 NOTE — PHYSICAL EXAM
[Exam Deferred] : exam was deferred [Normal Rate and Rhythm] : normal rate and rhythm [Alert] : alert [Oriented to Person] : oriented to person [Oriented to Place] : oriented to place [Oriented to Time] : oriented to time [Anxious] : anxious [JVD] : no jugular venous distention  [de-identified] : soft, non-distended, mild intermittent TTP at midline incision. Incisions well healed no palpable hernia, no skin changes. [de-identified] : awake, alert, NAD  [de-identified] : normocephalic, atraumatic, EOMI, normal conjunctiva  [de-identified] : b/l chest rise, EWOB on RA  [de-identified] : deferred [de-identified] : Normal strength  [de-identified] : abd as above [de-identified] : baseline anxious

## 2024-01-29 ENCOUNTER — TRANSCRIPTION ENCOUNTER (OUTPATIENT)
Age: 64
End: 2024-01-29

## 2024-01-31 RX ORDER — OMEPRAZOLE 20 MG/1
20 CAPSULE, DELAYED RELEASE ORAL
Qty: 90 | Refills: 3 | Status: ACTIVE | COMMUNITY
Start: 2022-10-25 | End: 1900-01-01

## 2024-02-06 ENCOUNTER — APPOINTMENT (OUTPATIENT)
Dept: CT IMAGING | Facility: CLINIC | Age: 64
End: 2024-02-06

## 2024-02-15 ENCOUNTER — APPOINTMENT (OUTPATIENT)
Dept: GASTROENTEROLOGY | Facility: CLINIC | Age: 64
End: 2024-02-15

## 2024-03-06 ENCOUNTER — APPOINTMENT (OUTPATIENT)
Dept: GASTROENTEROLOGY | Facility: CLINIC | Age: 64
End: 2024-03-06
Payer: MEDICARE

## 2024-03-06 VITALS
BODY MASS INDEX: 27.97 KG/M2 | OXYGEN SATURATION: 97 % | HEIGHT: 62 IN | SYSTOLIC BLOOD PRESSURE: 114 MMHG | HEART RATE: 92 BPM | WEIGHT: 152 LBS | DIASTOLIC BLOOD PRESSURE: 77 MMHG | TEMPERATURE: 98.1 F

## 2024-03-06 DIAGNOSIS — K59.09 OTHER CONSTIPATION: ICD-10-CM

## 2024-03-06 PROCEDURE — 99204 OFFICE O/P NEW MOD 45 MIN: CPT

## 2024-03-06 RX ORDER — ONDANSETRON 4 MG/1
4 TABLET, ORALLY DISINTEGRATING ORAL TWICE DAILY
Qty: 60 | Refills: 3 | Status: ACTIVE | COMMUNITY
Start: 2024-03-06 | End: 1900-01-01

## 2024-03-06 RX ORDER — UBROGEPANT 100 MG/1
100 TABLET ORAL
Refills: 0 | Status: ACTIVE | COMMUNITY

## 2024-03-06 RX ORDER — LINACLOTIDE 290 UG/1
290 CAPSULE, GELATIN COATED ORAL
Refills: 0 | Status: ACTIVE | COMMUNITY

## 2024-03-06 RX ORDER — TRAZODONE HYDROCHLORIDE 100 MG/1
100 TABLET ORAL
Refills: 0 | Status: ACTIVE | COMMUNITY

## 2024-03-06 RX ORDER — ROSUVASTATIN CALCIUM 20 MG/1
20 TABLET, FILM COATED ORAL
Refills: 0 | Status: ACTIVE | COMMUNITY

## 2024-03-06 RX ORDER — POLYETHYLENE GLYCOL 3350 AND ELECTROLYTES WITH LEMON FLAVOR 236; 22.74; 6.74; 5.86; 2.97 G/4L; G/4L; G/4L; G/4L; G/4L
236 POWDER, FOR SOLUTION ORAL
Qty: 1 | Refills: 0 | Status: ACTIVE | COMMUNITY
Start: 2024-03-06 | End: 1900-01-01

## 2024-03-06 RX ORDER — SUCRALFATE 1 G/10ML
1 SUSPENSION ORAL
Qty: 3600 | Refills: 3 | Status: ACTIVE | COMMUNITY
Start: 2024-03-06 | End: 1900-01-01

## 2024-03-06 RX ORDER — CLONAZEPAM 0.5 MG/1
0.5 TABLET, ORALLY DISINTEGRATING ORAL
Refills: 0 | Status: ACTIVE | COMMUNITY

## 2024-03-06 RX ORDER — LINACLOTIDE 290 UG/1
290 CAPSULE, GELATIN COATED ORAL
Qty: 30 | Refills: 3 | Status: ACTIVE | COMMUNITY
Start: 2024-03-06 | End: 1900-01-01

## 2024-03-06 RX ORDER — LEVOCETIRIZINE DIHYDROCHLORIDE 5 MG/1
5 TABLET ORAL
Refills: 0 | Status: ACTIVE | COMMUNITY

## 2024-03-06 RX ORDER — PANTOPRAZOLE SODIUM 40 MG/1
40 GRANULE, DELAYED RELEASE ORAL
Refills: 0 | Status: ACTIVE | COMMUNITY

## 2024-03-06 NOTE — HISTORY OF PRESENT ILLNESS
[de-identified] : The upper endoscopy performed by another gastroenterologist, Dr. Marisol Saab on March 19, 2021 revealed esophageal hiatal hernia, duodenum appeared normal without evidence of ulceration or mass lesions, the stomach appeared normal.  The gastric folds were small and easily distensible with no ulcers, mass lesions or angioectasias seen.  [FreeTextEntry1] : The MRI defecography without IV contrast performed on September 19, 2022 revealed anterior compartment mild cystocele, middle compartment mild vaginal prolapse, posterior compartment revealed moderate rectal descent with moderate peritoneal mucocele and enterocele as well as a small anterior rectocele.  There is global pelvic floor weakness with a low-lying anorectal junction at rest and diffuse thinning of the levator muscles with no discrete muscular defects.

## 2024-03-06 NOTE — ASSESSMENT
[FreeTextEntry1] : Abdominal Pain: The patient complains of abdominal pain. The patient is to avoid nonsteroidal anti-inflammatory drugs and aspirin.  I recommend a low FOD-MAP diet.  I recommend a trial of Dicyclomine 10 mg tablet PO 3 times a day PRN for the abdominal pain. Dyspepsia: The patient complains of dyspeptic symptoms.  The patient was advised to abide by an anti-gas (low FOD-MAP) diet.  The patient was given a pamphlet for anti-gas (low FOD-MAP).  The patient and I reviewed the anti-gas (low FOD-MAP) diet at length. The patient is to start on a trial of Simethicone one tablet 4 times a day p.r.n. abdominal pain and gas. GERD: The patient was advised to avoid late-night meals and dietary indiscretions.  The patient was advised to avoid fried and fatty foods.  The patient was advised to abide by an anti-GERD diet. The patient was given a pamphlet for anti-GERD.  The patient and I reviewed the anti-GERD diet at length. I recommend a trial of Sucralfate suspension 1 gram/10 cc 4 times a day x 3 months for the symptoms. Nausea: The patient complains of nausea. If the symptoms of nausea persists, the patient may require a trial of Zofran 4 mg twice a day.  Constipation: The patient complains of constipation. I recommend a high-fiber diet. I recommend a trial of a probiotic such as Align once a day. I recommend a trial of Metamucil once a day for fiber supplementation. I recommend a trial of Linzess 290 mcg once a day for the constipation. The patient agreed and will followup to reassess the symptoms.   Family History of GI Malignancy: The patient admits to a family history of GI problems.  The patient's mother had a history of colon cancer. High Risk Colon Cancer screening: I recommend colonoscopy for colon cancer screening over the age of 45 to assess for colonic polyps. The patient was told of the risks and benefits of the procedure.  The patient was told of the risks of perforation, emergency surgery, bleeding, infections and missed lesions. The patient is to be on a clear liquid diet the entire day prior to the procedure. The patient is to complete the entire prescribed bowel prep the day prior to the procedure as directed. The patient is told not to drive, drink alcohol, use recreational drugs, exercise, or work the day of the procedure.  The patient was told of the need for an escort to accompany the patient home after the procedure. The patient is aware that the procedure may be cancelled if they fail to follow the directions.  The patient agreed and will schedule for the procedure. The patient is to be n.p.o. after midnight and bowel prep was given.  The patient is to return for the procedure. History of Colonic Polyps: The patient has a history of colonic polyps.  I recommend a repeat colonoscopy to reassess for colonic polyps.  The patient was told of the risks and benefits of the procedure.  The patient was told of the risks of perforation, emergency surgery, bleeding, infections and missed lesions.  The patient is to be on a clear liquid diet the entire day prior to the procedure. The patient is to complete the entire prescribed bowel prep the day prior to the procedure as directed. The patient is told not to drive, drink alcohol, use recreational drugs, exercise, or work the day of the procedure.  The patient was told of the need for an escort to accompany the patient home after the procedure. The patient is aware that the procedure may be cancelled if they fail to follow the directions.  The patient agreed and will schedule for the procedure. The patient is to be n.p.o. after midnight and bowel prep was given.  The patient is to return for the procedure.  Upper Endoscopy: I recommend an upper endoscopy to assess for peptic ulcer disease versus esophagitis.  The patient was told of the risks and benefits of the procedure.  The patient was told of the risks of perforation, emergency surgery, bleeding, infections and missed lesions. The patient is told not to drive, drink alcohol, use recreational drugs, exercise, or work the day of the procedure.  The patient was told of the need for an escort to accompany the patient home after the procedure. The patient is aware that the procedure may be cancelled if they fail to follow the directions.  The patient agreed and will schedule for the procedure. The patient is to be n.p.o. after midnight.  The patient is to return for the procedure. Prior Endoscopic Procedures: The patient had a prior upper endoscopy and colonoscopy performed by another gastroenterologist.  I will try to obtain the prior upper endoscopy and colonoscopy reports.  The patient is to sign a record release to obtain those records. History of Rectal Prolapse: rectal prolapse, sp sigmoid resection and rectopexy with Dr. Velez on November 16, 2022. Prior ER Evaluation: The patient was previously evaluated at the [facility] emergency room for abdominal pain and cough.   The patient had blood work and imaging studies to assess the symptoms.  I reviewed the blood work and imaging studies performed in the emergency room. Follow-up: The patient is to follow-up in the office in 4 weeks to reassess the symptoms. The patient was told to call the office if any further problems.

## 2024-03-08 ENCOUNTER — TRANSCRIPTION ENCOUNTER (OUTPATIENT)
Age: 64
End: 2024-03-08

## 2024-03-11 ENCOUNTER — RESULT REVIEW (OUTPATIENT)
Age: 64
End: 2024-03-11

## 2024-03-12 ENCOUNTER — APPOINTMENT (OUTPATIENT)
Dept: CT IMAGING | Facility: CLINIC | Age: 64
End: 2024-03-12
Payer: MEDICARE

## 2024-03-12 PROCEDURE — 74177 CT ABD & PELVIS W/CONTRAST: CPT

## 2024-03-21 ENCOUNTER — APPOINTMENT (OUTPATIENT)
Dept: SURGERY | Facility: CLINIC | Age: 64
End: 2024-03-21
Payer: MEDICARE

## 2024-03-21 VITALS
HEART RATE: 100 BPM | BODY MASS INDEX: 27.79 KG/M2 | SYSTOLIC BLOOD PRESSURE: 121 MMHG | HEIGHT: 62 IN | OXYGEN SATURATION: 98 % | WEIGHT: 151 LBS | DIASTOLIC BLOOD PRESSURE: 77 MMHG

## 2024-03-21 PROCEDURE — 99214 OFFICE O/P EST MOD 30 MIN: CPT

## 2024-04-02 NOTE — ED PROVIDER NOTE - CHIEF COMPLAINT
The patient is a 62y Female complaining of pain, toe.
no abrasions, no jaundice, no lesions, no pruritis, and no rashes.

## 2024-04-04 ENCOUNTER — NON-APPOINTMENT (OUTPATIENT)
Age: 64
End: 2024-04-04

## 2024-04-24 NOTE — ASU PATIENT PROFILE, ADULT - FALL HARM RISK - UNIVERSAL INTERVENTIONS
Bed in lowest position, wheels locked, appropriate side rails in place/Call bell, personal items and telephone in reach/Instruct patient to call for assistance before getting out of bed or chair/Non-slip footwear when patient is out of bed/[place] to call system/Physically safe environment - no spills, clutter or unnecessary equipment/Purposeful Proactive Rounding/Room/bathroom lighting operational, light cord in reach

## 2024-04-25 ENCOUNTER — APPOINTMENT (OUTPATIENT)
Dept: GASTROENTEROLOGY | Facility: HOSPITAL | Age: 64
End: 2024-04-25

## 2024-04-25 ENCOUNTER — TRANSCRIPTION ENCOUNTER (OUTPATIENT)
Age: 64
End: 2024-04-25

## 2024-04-25 ENCOUNTER — RESULT REVIEW (OUTPATIENT)
Age: 64
End: 2024-04-25

## 2024-04-25 ENCOUNTER — OUTPATIENT (OUTPATIENT)
Dept: OUTPATIENT SERVICES | Facility: HOSPITAL | Age: 64
LOS: 1 days | End: 2024-04-25
Payer: COMMERCIAL

## 2024-04-25 VITALS
WEIGHT: 151.9 LBS | OXYGEN SATURATION: 98 % | HEART RATE: 91 BPM | TEMPERATURE: 98 F | HEIGHT: 62 IN | RESPIRATION RATE: 12 BRPM | SYSTOLIC BLOOD PRESSURE: 111 MMHG | DIASTOLIC BLOOD PRESSURE: 79 MMHG

## 2024-04-25 VITALS
SYSTOLIC BLOOD PRESSURE: 121 MMHG | HEART RATE: 75 BPM | OXYGEN SATURATION: 98 % | DIASTOLIC BLOOD PRESSURE: 81 MMHG | RESPIRATION RATE: 20 BRPM

## 2024-04-25 DIAGNOSIS — Z98.890 OTHER SPECIFIED POSTPROCEDURAL STATES: Chronic | ICD-10-CM

## 2024-04-25 DIAGNOSIS — Z98.89 OTHER SPECIFIED POSTPROCEDURAL STATES: Chronic | ICD-10-CM

## 2024-04-25 DIAGNOSIS — K21.9 GASTRO-ESOPHAGEAL REFLUX DISEASE WITHOUT ESOPHAGITIS: ICD-10-CM

## 2024-04-25 DIAGNOSIS — Z80.9 FAMILY HISTORY OF MALIGNANT NEOPLASM, UNSPECIFIED: ICD-10-CM

## 2024-04-25 PROCEDURE — 43239 EGD BIOPSY SINGLE/MULTIPLE: CPT

## 2024-04-25 PROCEDURE — G0105: CPT

## 2024-04-25 PROCEDURE — 45378 DIAGNOSTIC COLONOSCOPY: CPT

## 2024-04-25 PROCEDURE — 88312 SPECIAL STAINS GROUP 1: CPT | Mod: 26

## 2024-04-25 PROCEDURE — 88305 TISSUE EXAM BY PATHOLOGIST: CPT | Mod: 26

## 2024-04-25 PROCEDURE — 88312 SPECIAL STAINS GROUP 1: CPT

## 2024-04-25 PROCEDURE — 88305 TISSUE EXAM BY PATHOLOGIST: CPT

## 2024-04-25 DEVICE — ESOPHAGEAL BALLOON CATH CRE FIXED WIRE 10-11-12MM: Type: IMPLANTABLE DEVICE | Status: FUNCTIONAL

## 2024-04-25 DEVICE — ESOPHAGEAL BALLOON CATH CRE FIXED WIRE 6-7-8MM: Type: IMPLANTABLE DEVICE | Status: FUNCTIONAL

## 2024-04-25 DEVICE — ESOPHAGEAL BALLOON CATH CRE FIXED WIRE 8-9-10MM: Type: IMPLANTABLE DEVICE | Status: FUNCTIONAL

## 2024-04-25 RX ORDER — SODIUM CHLORIDE 9 MG/ML
500 INJECTION INTRAMUSCULAR; INTRAVENOUS; SUBCUTANEOUS
Refills: 0 | Status: COMPLETED | OUTPATIENT
Start: 2024-04-25 | End: 2024-04-25

## 2024-04-25 RX ADMIN — SODIUM CHLORIDE 30 MILLILITER(S): 9 INJECTION INTRAMUSCULAR; INTRAVENOUS; SUBCUTANEOUS at 14:59

## 2024-04-25 NOTE — ASU DISCHARGE PLAN (ADULT/PEDIATRIC) - NS MD DC FALL RISK RISK
For information on Fall & Injury Prevention, visit: [email or web address] OR  [email or web address] OR  https://www.cdc.gov/steadi/patient.html

## 2024-04-28 NOTE — PLAN
[TextEntry] : - CT scan normal without any explanation for her pain. Suspect it is some scar tissue causing occasional pain given the proximity to one of her trocar sites. Gave her a copy of the report - Also referred her to Dr. Noel for GI - Pt knows I am leaving [place] but can follow with other colorectal surgeons here as needed - RTC prn

## 2024-04-28 NOTE — PHYSICAL EXAM
[Exam Deferred] : exam was deferred [Normal Rate and Rhythm] : normal rate and rhythm [Alert] : alert [Oriented to Person] : oriented to person [Oriented to Place] : oriented to place [Oriented to Time] : oriented to time [Anxious] : anxious [JVD] : no jugular venous distention  [de-identified] : soft, non-distended, mild intermittent TTP at midline incision. Incisions well healed no palpable hernia, no skin changes. [de-identified] : awake, alert, NAD  [de-identified] : normocephalic, atraumatic, EOMI, normal conjunctiva  [de-identified] : b/l chest rise, EWOB on RA  [de-identified] : deferred [de-identified] : Normal strength  [de-identified] : abd as above [de-identified] : baseline anxious

## 2024-04-28 NOTE — ASSESSMENT
[FreeTextEntry1] : Ms. PHYLLIS MERRITT is a 63 year F w/ a history of chronic constipation, rectal prolapse and pelvic floor dysfunction (weakness) s/p robotic anterior resection and suture rectopexy on 11/16/2022 presenting for follow-up of CT scan.

## 2024-04-28 NOTE — HISTORY OF PRESENT ILLNESS
[FreeTextEntry1] : Ms. PHYLLIS MERRITT is a 63 year F with a history of bipolar, chronic constipation, cervical pain, acid reflux, endometriosis, chronic migraines and rectal prolapse here for a follow up visit. She originally saw Dr. Sanchez who recommended a abdominal rectal prolapse repair but she was doubtful of that recommendation is here for a second opinion because she wanted to know if it would be safer going through the rectum due to her prior surgeries for endometriosis. She has a history of chronic constipation and was taking Linzess and motrgrity twice a week (can't do it more often because it interferes with work then). On the other days she has been relying more on Herbal tea and Mineral oil. She has daily normal to soft BM, with occasional straining. She has a significant issue with having to strain to urinate each time. She has been having some issues with bloating as well. She has been Vegan for 5 years and has tried greek yogurt in the morning. She saw pelvic floor therapy in 2021 and has been doing breathing and Kegel exercises since (sounds like maybe she was diagnosed w/ some form of obstructive defecation in the past). Last colonoscopy (2019) revealed diverticulosis and 1 polyp.She underwent a MRI defecography on 9/19/2022 which revealed a small rectocele, rectal prolapse, mild vaginal prolapse and pelvic floor weakness. On 11/16/2022 she underwent an uneventful robotic anterior resection, suture rectopexy and flex sig. She had an uneventful post-op course and was discharged home on POD 3 having BMs and tolerating a diet. During 12/20/2022 visit, she was still on a LFD as fiber causes bloating and nausea. She is no longer taking Linzess or Motegrity and is able to have BMs regularly. She initially tried dulcolax which led to diarrhea and anal bleeding. She then tried MiraLAX and senna which did not help with constipation. She saw Dr. Granda who recommended MiraLAX BID, colace and Benefiber. She still has no recurrent rectal prolapse. She started seeing a nutritionist in January 2023. During 6/6/2023 visit, patient is complaining of continued straining with urination and is concerned that the constant straining will interfere with her rectopexy as well as the anxiety it causes her. She didn't get a chance to do pelvic floor PT after her rectopexy as she had other medical conditions that she was preoccupied with. She has since called to schedule an appointment but they don't have an appointment for a couple of months. She followed-up with Dr. Parr who diagnosed her with a rectocele, cystocele, uterovaginal prolapse. On 7/25/2023, patient was experiencing constipation and still has to strain to urinate. She has been taking senna without much relief and does not want to restart Linzess (was too strong for her). Dr. Parr recently prescribed solifenacin Succinate for OAB which she reports helps with the nocturia. At her last appointment she was complaining of upper midline discomfort (a couple of cms superior to the midline incision) for the past 6 months. Denies signs and symptoms of rectal prolapse, nausea, vomiting, fevers/chills. Given the pain we ordered a CT which she had done and is here to review the results.

## 2024-04-29 LAB — SURGICAL PATHOLOGY STUDY: SIGNIFICANT CHANGE UP

## 2024-04-29 NOTE — ED ADULT NURSE NOTE - CAS TRG [facility]AL [facility], ML[facility]
Attempted to reach patient for:Post CT program follow up.  Outcome:Left voicemail message requesting a return call and provided the number to call: [phone].  Next Follow Up:One-two weeks.  
Patent

## 2024-05-01 ENCOUNTER — APPOINTMENT (OUTPATIENT)
Dept: OBGYN | Facility: CLINIC | Age: 64
End: 2024-05-01

## 2024-05-15 ENCOUNTER — APPOINTMENT (OUTPATIENT)
Dept: GASTROENTEROLOGY | Facility: CLINIC | Age: 64
End: 2024-05-15
Payer: MEDICARE

## 2024-05-15 VITALS
OXYGEN SATURATION: 98 % | BODY MASS INDEX: 27.79 KG/M2 | HEIGHT: 62 IN | HEART RATE: 110 BPM | WEIGHT: 151 LBS | DIASTOLIC BLOOD PRESSURE: 80 MMHG | TEMPERATURE: 97.9 F | SYSTOLIC BLOOD PRESSURE: 116 MMHG

## 2024-05-15 DIAGNOSIS — R10.9 UNSPECIFIED ABDOMINAL PAIN: ICD-10-CM

## 2024-05-15 DIAGNOSIS — K21.9 GASTRO-ESOPHAGEAL REFLUX DISEASE W/OUT ESOPHAGITIS: ICD-10-CM

## 2024-05-15 DIAGNOSIS — R10.13 EPIGASTRIC PAIN: ICD-10-CM

## 2024-05-15 DIAGNOSIS — K59.00 CONSTIPATION, UNSPECIFIED: ICD-10-CM

## 2024-05-15 DIAGNOSIS — R14.0 ABDOMINAL DISTENSION (GASEOUS): ICD-10-CM

## 2024-05-15 DIAGNOSIS — Z86.010 PERSONAL HISTORY OF COLONIC POLYPS: ICD-10-CM

## 2024-05-15 DIAGNOSIS — R11.2 NAUSEA WITH VOMITING, UNSPECIFIED: ICD-10-CM

## 2024-05-15 DIAGNOSIS — Z80.9 FAMILY HISTORY OF MALIGNANT NEOPLASM, UNSPECIFIED: ICD-10-CM

## 2024-05-15 PROCEDURE — 99214 OFFICE O/P EST MOD 30 MIN: CPT

## 2024-05-15 RX ORDER — LINACLOTIDE 145 UG/1
145 CAPSULE, GELATIN COATED ORAL
Qty: 30 | Refills: 3 | Status: ACTIVE | COMMUNITY
Start: 2024-05-15 | End: 1900-01-01

## 2024-05-15 RX ORDER — POLYETHYLENE GLYCOL 3350, SODIUM SULFATE, SODIUM CHLORIDE, POTASSIUM CHLORIDE, ASCORBIC ACID, SODIUM ASCORBATE 140-9-5.2G
140 KIT ORAL TWICE DAILY
Qty: 2 | Refills: 0 | Status: ACTIVE | COMMUNITY
Start: 2024-05-15 | End: 1900-01-01

## 2024-05-15 RX ORDER — SUCRALFATE 1 G/1
1 TABLET ORAL 4 TIMES DAILY
Qty: 120 | Refills: 3 | Status: ACTIVE | COMMUNITY
Start: 2024-03-07 | End: 1900-01-01

## 2024-05-15 RX ORDER — PANTOPRAZOLE 40 MG/1
40 TABLET, DELAYED RELEASE ORAL
Qty: 30 | Refills: 3 | Status: ACTIVE | COMMUNITY
Start: 2024-03-07 | End: 1900-01-01

## 2024-05-15 NOTE — ASSESSMENT
[FreeTextEntry1] : Abdominal Pain: The patient complains of abdominal pain. The patient is to avoid nonsteroidal anti-inflammatory drugs and aspirin.  I recommend a trial of Carafate 1 gram PO 4 times a day for 3 months for the symptoms. Dyspepsia: The patient complains of dyspeptic symptoms.  The patient was advised to continue to abide by an anti-gas (low FOD-MAP) diet.  The patient was previously given a pamphlet for anti-gas (low FOD-MAP).  The patient and I reviewed the anti-gas (low FOD-MAP) diet at length again. The patient is to consider restarting a trial of Simethicone one tablet 4 times a day p.r.n. abdominal pain and gas. GERD: The patient was advised to avoid late-night meals and dietary indiscretions.  The patient was advised to avoid fried and fatty foods.  The patient was advised to abide by an anti-GERD diet. The patient was given a pamphlet for anti-GERD.  The patient and I reviewed the anti-GERD diet at length. I recommend continue on a trial of Pantoprazole 40 mg once a day x 3 months for the symptoms. Nausea/Vomiting: The patient complains of nausea/vomiting. If the symptoms of nausea/vomiting persists, the patient may require a trial of Zofran 4 mg twice a day. Constipation: The patient complains of constipation. I recommend a high-fiber diet. I recommend a trial of a probiotic such as Align once a day. I recommend a trial of Metamucil once a day for fiber supplementation. I recommend decreasing to a trial of Linzess 145 mcg once a day for the constipation.  The patient agreed and will followup to reassess the symptoms.   Hiatal Hernia:  The patient has a history of a hiatal hernia noted on prior upper endoscopy. The patient was advised to avoid late-night meals and dietary indiscretions.  The patient was advised to avoid fried and fatty foods.  The patient was advised to abide by an anti-GERD diet. The patient was given a pamphlet for anti-GERD.  The patient and I reviewed the anti-GERD diet at length. I recommend continue on a trial of Pantoprazole 40 mg once a day for 3 months for the symptoms. Gastritis: The patient has a history of gastritis noted on prior upper endoscopy. The patient is to avoid nonsteroidal anti-inflammatory drugs and aspirin. I recommend continue on a trial of pantoprazole 40 mg once a day for 3 months for the symptoms.  Intestinal Metaplasia of the Stomach:  The patient was found to have intestinal metaplasia of the stomach on prior upper endoscopy.  The findings of intestinal metaplasia of the stomach have an increased risk of gastric cancer.  The biopsies did not reveal dysplasia.  I recommend a repeat upper endoscopy with mapping in 3 years to reassess for intestinal metaplasia and dysplasia unless symptomatic.  The patient is aware of the increased risk of intestinal metaplasia and progression to stomach cancer.  The patient agrees to follow-up. Diverticulosis: I recommend a high-fiber diet and avoid seeds. The patient is to consider a trial of Metamucil once a day for fiber supplementation. The patient is to also consider a trial of a probiotic such as Align once a day. The patient and I discussed the potential risk of diverticulitis and diverticular bleeding secondary to diverticular disease. The patient is aware of the importance of follow-up if these complications arise. Poor Prep Colonoscopy: The patient had a recent poor prep colonoscopy. I recommend a repeat colonoscopy in < 1 3 to 6 months to reassess for colonic polyps secondary to the poor prep.  The patient was told of the risks and benefits of the procedure.  The patient was told of the risks of perforation, emergency surgery, bleeding, infections and missed lesions. The patient agreed and will return for the procedure. History of Colonic Polyps: The patient has a history of colonic polyps.  I recommend a repeat colonoscopy in 3 to 6 months to reassess for colonic polyps unless symptomatic.  The patient agreed and will follow up for the procedure.  Family History of GI Malignancy: The patient admits to a family history of GI problems. The patient's mother had a history of colon cancer. Prior Endoscopic Procedures: The patient had a prior upper endoscopy and colonoscopy performed by another gastroenterologist. I will try to obtain the prior upper endoscopy and colonoscopy reports. The patient is to sign a record release to obtain those records. History of Rectal Prolapse: The patient had a history of rectal prolapse, s/p sigmoid resection and rectopexy with Dr. Velez on November 16, 2022.  The surgical pathology performed on November 16, 2022 revealed unremarkable colonic segments (proximal and distal margins of the rectosigmoid colon resection), benign hyperplastic folds of colorectal mucosa clinically prolapse (rectosigmoid colon resection). The patient tolerated the surgery well.  Prior ER Evaluation: The patient was previously evaluated at the [facility] emergency room for abdominal pain and cough. The patient had blood work and imaging studies to assess the symptoms. I reviewed the blood work and imaging studies performed in the emergency room. Follow-up: The patient is to follow-up in the office in 4 weeks to reassess the symptoms. The patient was told to call the office if any further problems.

## 2024-05-15 NOTE — HISTORY OF PRESENT ILLNESS
[de-identified] : The upper endoscopy performed at the [facility] endoscopy suite on April 25 2024 revealed a small hiatal hernia and mild diffuse gastritis. The gastric pathology performed on April 25, 2024 revealed esophageal mucosa with squamous mucosa without significant pathologic alteration with a PASF stain that is negative for fungal microorganisms (esophagus), gastric antral mucosa with patchy chronic gastritis with foci of intestinal (goblet cell) metaplasia that was negative for Helicobacter pylori (antrum), gastric body mucosa with benign minimally inflamed oxyntic mucosa that was negative for Helicobacter pylori (body of the stomach) and benign duodenal mucosa with intact villous architecture that was negative for significant intraepithelial lymphocytosis (duodenum).    The upper endoscopy performed by another gastroenterologist, Dr. Marisol Saab on March 19, 2021 revealed esophageal hiatal hernia, duodenum appeared normal without evidence of ulceration or mass lesions, the stomach appeared normal.  The gastric folds were small and easily distensible with no ulcers, mass lesions or angioectasias seen.  [FreeTextEntry1] : The MRI defecography without IV contrast performed on September 19, 2022 revealed anterior compartment mild cystocele, middle compartment mild vaginal prolapse, posterior compartment revealed moderate rectal descent with moderate peritoneal mucocele and enterocele as well as a small anterior rectocele.  There is global pelvic floor weakness with a low-lying anorectal junction at rest and diffuse thinning of the levator muscles with no discrete muscular defects.

## 2024-06-24 ENCOUNTER — APPOINTMENT (OUTPATIENT)
Dept: GASTROENTEROLOGY | Facility: CLINIC | Age: 64
End: 2024-06-24

## 2024-07-08 ENCOUNTER — APPOINTMENT (OUTPATIENT)
Dept: GASTROENTEROLOGY | Facility: CLINIC | Age: 64
End: 2024-07-08
Payer: MEDICARE

## 2024-07-08 VITALS
WEIGHT: 155 LBS | TEMPERATURE: 97.9 F | HEART RATE: 103 BPM | SYSTOLIC BLOOD PRESSURE: 117 MMHG | DIASTOLIC BLOOD PRESSURE: 80 MMHG | HEIGHT: 62 IN | BODY MASS INDEX: 28.52 KG/M2 | OXYGEN SATURATION: 96 %

## 2024-07-08 DIAGNOSIS — K21.9 GASTRO-ESOPHAGEAL REFLUX DISEASE W/OUT ESOPHAGITIS: ICD-10-CM

## 2024-07-08 DIAGNOSIS — K62.3 RECTAL PROLAPSE: ICD-10-CM

## 2024-07-08 DIAGNOSIS — K59.09 OTHER CONSTIPATION: ICD-10-CM

## 2024-07-08 DIAGNOSIS — R10.13 EPIGASTRIC PAIN: ICD-10-CM

## 2024-07-08 DIAGNOSIS — Z80.9 FAMILY HISTORY OF MALIGNANT NEOPLASM, UNSPECIFIED: ICD-10-CM

## 2024-07-08 DIAGNOSIS — R10.9 UNSPECIFIED ABDOMINAL PAIN: ICD-10-CM

## 2024-07-08 DIAGNOSIS — Z86.010 PERSONAL HISTORY OF COLONIC POLYPS: ICD-10-CM

## 2024-07-08 PROCEDURE — 99214 OFFICE O/P EST MOD 30 MIN: CPT

## 2024-07-08 RX ORDER — SUCRALFATE 1 G/1
1 TABLET ORAL 4 TIMES DAILY
Qty: 120 | Refills: 3 | Status: ACTIVE | COMMUNITY
Start: 2024-07-08 | End: 1900-01-01

## 2024-07-08 RX ORDER — POLYETHYLENE GLYCOL 3350 17 G/17G
17 POWDER, FOR SOLUTION ORAL DAILY
Qty: 30 | Refills: 3 | Status: ACTIVE | COMMUNITY
Start: 2024-07-08 | End: 1900-01-01

## 2024-07-08 RX ORDER — PANTOPRAZOLE 40 MG/1
40 TABLET, DELAYED RELEASE ORAL DAILY
Qty: 30 | Refills: 2 | Status: ACTIVE | COMMUNITY
Start: 2024-07-08 | End: 1900-01-01

## 2024-08-22 ENCOUNTER — TRANSCRIPTION ENCOUNTER (OUTPATIENT)
Age: 64
End: 2024-08-22

## 2024-08-22 ENCOUNTER — RESULT REVIEW (OUTPATIENT)
Age: 64
End: 2024-08-22

## 2024-08-22 ENCOUNTER — APPOINTMENT (OUTPATIENT)
Dept: GASTROENTEROLOGY | Facility: HOSPITAL | Age: 64
End: 2024-08-22

## 2024-09-18 ENCOUNTER — APPOINTMENT (OUTPATIENT)
Dept: DERMATOLOGY | Facility: CLINIC | Age: 64
End: 2024-09-18
Payer: MEDICARE

## 2024-09-18 DIAGNOSIS — L85.3 XEROSIS CUTIS: ICD-10-CM

## 2024-09-18 DIAGNOSIS — L30.9 DERMATITIS, UNSPECIFIED: ICD-10-CM

## 2024-09-18 PROCEDURE — 99204 OFFICE O/P NEW MOD 45 MIN: CPT

## 2024-09-18 RX ORDER — HALOBETASOL PROPIONATE 0.5 MG/G
0.05 OINTMENT TOPICAL
Qty: 1 | Refills: 2 | Status: ACTIVE | COMMUNITY
Start: 2024-09-18 | End: 1900-01-01

## 2024-09-18 NOTE — HISTORY OF PRESENT ILLNESS
[FreeTextEntry1] : Right hand Rash  [de-identified] : Manuela White 64 y Female Ms Cindy is here today because she has a rash on her right hand palm it been going around for about 6 months now gets somewhat better with the ketoconazole 2% cream but then it gets worse when she goes to work (works as an ) has tried halobetasol with some improvement  Personal history of skin cancer: no history Family history of skin cancer:   no family history History of blistering sunburns: no History of tanning bed use: no

## 2024-09-18 NOTE — ASSESSMENT
[FreeTextEntry1] : #Hand eczema #Xerosis -chronic, flaring -I have discussed the chronic nature and course of this condition -continue halobetasol ointment bid to affected areas prn flare -Proper medication use and side effects discussed, including cutaneous atrophy, telangiectasias, and striae. Avoid long-term use. -wear white cotton gloves at night with emollients -avoid irritants as able   RTC PRN

## 2024-09-18 NOTE — PHYSICAL EXAM
[Alert] : alert [Oriented x 3] : ~L oriented x 3 [Declined] : declined [FreeTextEntry3] : Focused exam: -R palm with scaly pink patches and xerosis bl feet clear

## 2024-10-02 ENCOUNTER — APPOINTMENT (OUTPATIENT)
Dept: GASTROENTEROLOGY | Facility: CLINIC | Age: 64
End: 2024-10-02
Payer: MEDICARE

## 2024-10-02 VITALS
WEIGHT: 157 LBS | BODY MASS INDEX: 28.72 KG/M2 | DIASTOLIC BLOOD PRESSURE: 82 MMHG | SYSTOLIC BLOOD PRESSURE: 115 MMHG | HEART RATE: 110 BPM | TEMPERATURE: 97.2 F | OXYGEN SATURATION: 99 %

## 2024-10-02 DIAGNOSIS — D12.6 BENIGN NEOPLASM OF COLON, UNSPECIFIED: ICD-10-CM

## 2024-10-02 DIAGNOSIS — R11.2 NAUSEA WITH VOMITING, UNSPECIFIED: ICD-10-CM

## 2024-10-02 DIAGNOSIS — Z86.0100 PERSONAL HISTORY OF COLON POLYPS, UNSPECIFIED: ICD-10-CM

## 2024-10-02 DIAGNOSIS — K57.30 DIVERTICULOSIS OF LARGE INTESTINE W/OUT PERFORATION OR ABSCESS W/OUT BLEEDING: ICD-10-CM

## 2024-10-02 DIAGNOSIS — K21.9 GASTRO-ESOPHAGEAL REFLUX DISEASE W/OUT ESOPHAGITIS: ICD-10-CM

## 2024-10-02 DIAGNOSIS — R10.13 EPIGASTRIC PAIN: ICD-10-CM

## 2024-10-02 DIAGNOSIS — K64.8 OTHER HEMORRHOIDS: ICD-10-CM

## 2024-10-02 DIAGNOSIS — K59.09 OTHER CONSTIPATION: ICD-10-CM

## 2024-10-02 PROCEDURE — 99214 OFFICE O/P EST MOD 30 MIN: CPT

## 2024-10-02 RX ORDER — SIMETHICONE 180 MG
180 CAPSULE ORAL 4 TIMES DAILY
Qty: 120 | Refills: 3 | Status: ACTIVE | COMMUNITY
Start: 2024-10-02 | End: 1900-01-01

## 2024-10-02 RX ORDER — PANTOPRAZOLE 40 MG/1
40 TABLET, DELAYED RELEASE ORAL DAILY
Qty: 30 | Refills: 3 | Status: ACTIVE | COMMUNITY
Start: 2024-10-02 | End: 1900-01-01

## 2024-10-02 RX ORDER — ONDANSETRON 4 MG/1
4 TABLET, ORALLY DISINTEGRATING ORAL TWICE DAILY
Qty: 60 | Refills: 3 | Status: ACTIVE | COMMUNITY
Start: 2024-10-02 | End: 1900-01-01

## 2024-10-02 NOTE — ASSESSMENT
[FreeTextEntry1] : Dyspepsia: The patient complains of dyspeptic symptoms.  The patient was advised to continue to abide by an anti-gas (low FOD-MAP) diet.  The patient was previously given a pamphlet for anti-gas (low FOD-MAP).  The patient and I reviewed the anti-gas (low FOD-MAP)diet at length again. The patient is to continue on a trial of Simethicone one tablet 4 times a day p.r.n. abdominal pain and gas. GERD: The patient was advised to avoid late-night meals and dietary indiscretions.  The patient was advised to avoid fried and fatty foods.  The patient was advised to abide by an anti-GERD diet. The patient was given a pamphlet for anti-GERD.  The patient and I reviewed the anti-GERD diet at length. I recommend a trial of Pantoprazole 40 mg once a day x 3 months for the symptoms. Nausea/Vomiting: The patient complains of nausea/vomiting. If the symptoms of nausea/vomiting persists, the patient may require a trial of Zofran 4 mg twice a day. Constipation: The patient complains of constipation. I recommend a high-fiber diet. I recommend a trial of a probiotic such as Align once a day. I recommend a trial of Metamucil once a day for fiber supplementation. I recommend continue on a trial of Linzess 145 mcg once a day for the constipation.  The patient agreed and will followup to reassess the symptoms.   Colonic Polyp: The patient was found to have colonic polyps on prior colonoscopy.  I recommend a repeat colonoscopy in 3 years to reassess for colonic polyps pending patient's health unless symptomatic.  The patient agreed and will follow up for the procedure.  Diverticulosis: I recommend a high-fiber diet and avoid seeds. The patient is to consider a trial of Metamucil once a day for fiber supplementation. The patient is to also consider a trial of a probiotic such as Align once a day. The patient and I discussed the potential risk of diverticulitis and diverticular bleeding secondary to diverticular disease. The patient is aware of the importance of follow-up if these complications arise. Internal Hemorrhoids: The patient is to consider starting a trial of Anusol H. C. suppositories one per rectum nightly and Anusol HC2 .5% cream apply to affected area twice a day p.r.n. hemorrhoidal bleeding or pain. I also recommend a trial of Calmoseptine cream apply to affected area twice a day for hemorrhoidal discomfort.  I recommend Tucks pads for the hemorrhoids.  I recommend Sitz baths twice a day for the hemorrhoids.  I recommend avoid wearing tight underwear and use boxers.  I recommend avoid touching the perineal area. The patient agreed to followup.  History of Colonic Polyps: The patient has a history of colonic polyps.  I recommend a repeat colonoscopy in 3 years to reassess for colonic polyps unless symptomatic.  The patient agreed and will follow up for the procedure.  I recommend a repeat colonoscopy in 3 years to reassess for colonic polyps unless symptomatic.  The patient agreed and will follow up for the procedure.  Hiatal Hernia: The patient has a history of a hiatal hernia noted on prior upper endoscopy. The patient was advised to avoid late-night meals and dietary indiscretions. The patient was advised to avoid fried and fatty foods. The patient was advised to abide by an anti-GERD diet. The patient was given a pamphlet for anti-GERD. The patient and I reviewed the anti-GERD diet at length. I recommend continue on a trial of Pantoprazole 40 mg once a day for 3 months for the symptoms. Gastritis: The patient has a history of gastritis noted on prior upper endoscopy. The patient is to avoid nonsteroidal anti-inflammatory drugs and aspirin. I recommend continue on a trial of pantoprazole 40 mg once a day for 3 months for the symptoms. Intestinal Metaplasia of the Stomach: The patient was found to have intestinal metaplasia of the stomach on prior upper endoscopy. The findings of intestinal metaplasia of the stomach have an increased risk of gastric cancer. The biopsies did not reveal dysplasia. I recommend a repeat upper endoscopy with mapping in 3 years to reassess for intestinal metaplasia and dysplasia unless symptomatic. The patient is aware of the increased risk of intestinal metaplasia and progression to stomach cancer. The patient agrees to follow-up. Family History of GI Malignancy: The patient admits to a family history of GI problems. The patient's mother had a history of colon cancer. Prior Endoscopic Procedures: The patient had a prior upper endoscopy and colonoscopy performed by another gastroenterologist. I will try to obtain the prior upper endoscopy and colonoscopy reports. The patient is to sign a record release to obtain those records. History of Rectal Prolapse: The patient had a history of rectal prolapse, s/p sigmoid resection and rectopexy with Dr. Rubio on November 16, 2022. The surgical pathology performed on November 16, 2022 revealed unremarkable colonic segments (proximal and distal margins of the rectosigmoid colon resection), benign hyperplastic folds of colorectal mucosa clinically prolapse (rectosigmoid colon resection). The patient tolerated the surgery well. Prior ER Evaluation: The patient was previously evaluated at the [facility] emergency room for abdominal pain and cough. The patient had blood work and imaging studies to assess the symptoms. I reviewed the blood work and imaging studies performed in the emergency room. Follow-up: The patient is to follow-up in the office in 6 months to reassess the symptoms. The patient was told to call the office if any further problems.

## 2024-10-02 NOTE — HISTORY OF PRESENT ILLNESS
[de-identified] : The CAT scan of the abdomen and pelvis with IV contrast performed on March 12, 2024 revealed no bowel obstruction or gross bowel wall thickening and normal appendix.  Also noted with degenerative changes of the bones, prior rectosigmoid colon anastomosis which appears patent, fundal fibroids with no adnexal masses bilateral upper pole kidney cysts, subcentimeter hypodensities too small to characterize and unchanged in the liver. [FreeTextEntry1] : The MRI defecography without IV contrast performed on September 19, 2022 revealed anterior compartment mild cystocele, middle compartment mild vaginal prolapse, posterior compartment revealed moderate rectal descent with moderate peritoneal mucocele and enterocele as well as a small anterior rectocele.  There is global pelvic floor weakness with a low-lying anorectal junction at rest and diffuse thinning of the levator muscles with no discrete muscular defects.

## 2024-10-11 ENCOUNTER — EMERGENCY (EMERGENCY)
Facility: HOSPITAL | Age: 64
LOS: 1 days | Discharge: HOME | End: 2024-10-11
Attending: STUDENT IN AN ORGANIZED HEALTH CARE EDUCATION/TRAINING PROGRAM
Payer: COMMERCIAL

## 2024-10-11 DIAGNOSIS — Z98.890 OTHER SPECIFIED POSTPROCEDURAL STATES: Chronic | ICD-10-CM

## 2024-10-11 DIAGNOSIS — Z98.89 OTHER SPECIFIED POSTPROCEDURAL STATES: Chronic | ICD-10-CM

## 2024-10-11 PROCEDURE — 99285 EMERGENCY DEPT VISIT HI MDM: CPT | Mod: 25

## 2024-10-12 VITALS
SYSTOLIC BLOOD PRESSURE: 131 MMHG | OXYGEN SATURATION: 95 % | HEIGHT: 62 IN | RESPIRATION RATE: 18 BRPM | DIASTOLIC BLOOD PRESSURE: 88 MMHG | HEART RATE: 95 BPM | WEIGHT: 149.91 LBS | TEMPERATURE: 98 F

## 2024-10-12 VITALS
TEMPERATURE: 98 F | RESPIRATION RATE: 16 BRPM | HEART RATE: 70 BPM | SYSTOLIC BLOOD PRESSURE: 141 MMHG | DIASTOLIC BLOOD PRESSURE: 82 MMHG | OXYGEN SATURATION: 96 %

## 2024-10-12 LAB
ALBUMIN SERPL ELPH-MCNC: 3.3 G/DL — LOW (ref 3.5–5)
ALP SERPL-CCNC: 84 U/L — SIGNIFICANT CHANGE UP (ref 40–120)
ALT FLD-CCNC: 30 U/L DA — SIGNIFICANT CHANGE UP (ref 10–60)
ANION GAP SERPL CALC-SCNC: 8 MMOL/L — SIGNIFICANT CHANGE UP (ref 5–17)
APPEARANCE UR: CLEAR — SIGNIFICANT CHANGE UP
AST SERPL-CCNC: 17 U/L — SIGNIFICANT CHANGE UP (ref 10–40)
BASOPHILS # BLD AUTO: 0.05 K/UL — SIGNIFICANT CHANGE UP (ref 0–0.2)
BASOPHILS NFR BLD AUTO: 0.5 % — SIGNIFICANT CHANGE UP (ref 0–2)
BILIRUB SERPL-MCNC: 0.2 MG/DL — SIGNIFICANT CHANGE UP (ref 0.2–1.2)
BILIRUB UR-MCNC: NEGATIVE — SIGNIFICANT CHANGE UP
BUN SERPL-MCNC: 11 MG/DL — SIGNIFICANT CHANGE UP (ref 7–18)
CALCIUM SERPL-MCNC: 9.5 MG/DL — SIGNIFICANT CHANGE UP (ref 8.4–10.5)
CHLORIDE SERPL-SCNC: 106 MMOL/L — SIGNIFICANT CHANGE UP (ref 96–108)
CO2 SERPL-SCNC: 29 MMOL/L — SIGNIFICANT CHANGE UP (ref 22–31)
COLOR SPEC: YELLOW — SIGNIFICANT CHANGE UP
CREAT SERPL-MCNC: 0.94 MG/DL — SIGNIFICANT CHANGE UP (ref 0.5–1.3)
DIFF PNL FLD: NEGATIVE — SIGNIFICANT CHANGE UP
EGFR: 69 ML/MIN/1.73M2 — SIGNIFICANT CHANGE UP
EOSINOPHIL # BLD AUTO: 0.17 K/UL — SIGNIFICANT CHANGE UP (ref 0–0.5)
EOSINOPHIL NFR BLD AUTO: 1.7 % — SIGNIFICANT CHANGE UP (ref 0–6)
GLUCOSE SERPL-MCNC: 101 MG/DL — HIGH (ref 70–99)
GLUCOSE UR QL: NEGATIVE MG/DL — SIGNIFICANT CHANGE UP
HCT VFR BLD CALC: 38.6 % — SIGNIFICANT CHANGE UP (ref 34.5–45)
HGB BLD-MCNC: 12.5 G/DL — SIGNIFICANT CHANGE UP (ref 11.5–15.5)
IMM GRANULOCYTES NFR BLD AUTO: 1.3 % — HIGH (ref 0–0.9)
KETONES UR-MCNC: NEGATIVE MG/DL — SIGNIFICANT CHANGE UP
LEUKOCYTE ESTERASE UR-ACNC: NEGATIVE — SIGNIFICANT CHANGE UP
LIDOCAIN IGE QN: 32 U/L — SIGNIFICANT CHANGE UP (ref 13–75)
LYMPHOCYTES # BLD AUTO: 3.62 K/UL — HIGH (ref 1–3.3)
LYMPHOCYTES # BLD AUTO: 36.9 % — SIGNIFICANT CHANGE UP (ref 13–44)
MCHC RBC-ENTMCNC: 28.9 PG — SIGNIFICANT CHANGE UP (ref 27–34)
MCHC RBC-ENTMCNC: 32.4 GM/DL — SIGNIFICANT CHANGE UP (ref 32–36)
MCV RBC AUTO: 89.4 FL — SIGNIFICANT CHANGE UP (ref 80–100)
MONOCYTES # BLD AUTO: 0.82 K/UL — SIGNIFICANT CHANGE UP (ref 0–0.9)
MONOCYTES NFR BLD AUTO: 8.4 % — SIGNIFICANT CHANGE UP (ref 2–14)
NEUTROPHILS # BLD AUTO: 5.01 K/UL — SIGNIFICANT CHANGE UP (ref 1.8–7.4)
NEUTROPHILS NFR BLD AUTO: 51.2 % — SIGNIFICANT CHANGE UP (ref 43–77)
NITRITE UR-MCNC: NEGATIVE — SIGNIFICANT CHANGE UP
NRBC # BLD: 0 /100 WBCS — SIGNIFICANT CHANGE UP (ref 0–0)
PH UR: 7.5 — SIGNIFICANT CHANGE UP (ref 5–8)
PLATELET # BLD AUTO: 229 K/UL — SIGNIFICANT CHANGE UP (ref 150–400)
POTASSIUM SERPL-MCNC: 3.9 MMOL/L — SIGNIFICANT CHANGE UP (ref 3.5–5.3)
POTASSIUM SERPL-SCNC: 3.9 MMOL/L — SIGNIFICANT CHANGE UP (ref 3.5–5.3)
PROT SERPL-MCNC: 6.7 G/DL — SIGNIFICANT CHANGE UP (ref 6–8.3)
PROT UR-MCNC: NEGATIVE MG/DL — SIGNIFICANT CHANGE UP
RBC # BLD: 4.32 M/UL — SIGNIFICANT CHANGE UP (ref 3.8–5.2)
RBC # FLD: 14.2 % — SIGNIFICANT CHANGE UP (ref 10.3–14.5)
SODIUM SERPL-SCNC: 143 MMOL/L — SIGNIFICANT CHANGE UP (ref 135–145)
SP GR SPEC: 1.05 — HIGH (ref 1–1.03)
UROBILINOGEN FLD QL: 0.2 MG/DL — SIGNIFICANT CHANGE UP (ref 0.2–1)
WBC # BLD: 9.8 K/UL — SIGNIFICANT CHANGE UP (ref 3.8–10.5)
WBC # FLD AUTO: 9.8 K/UL — SIGNIFICANT CHANGE UP (ref 3.8–10.5)

## 2024-10-12 PROCEDURE — 74177 CT ABD & PELVIS W/CONTRAST: CPT | Mod: MC

## 2024-10-12 PROCEDURE — 81003 URINALYSIS AUTO W/O SCOPE: CPT

## 2024-10-12 PROCEDURE — 80053 COMPREHEN METABOLIC PANEL: CPT

## 2024-10-12 PROCEDURE — 96375 TX/PRO/DX INJ NEW DRUG ADDON: CPT

## 2024-10-12 PROCEDURE — 99284 EMERGENCY DEPT VISIT MOD MDM: CPT | Mod: 25

## 2024-10-12 PROCEDURE — 74177 CT ABD & PELVIS W/CONTRAST: CPT | Mod: 26,MC

## 2024-10-12 PROCEDURE — 96374 THER/PROPH/DIAG INJ IV PUSH: CPT | Mod: XU

## 2024-10-12 PROCEDURE — 85025 COMPLETE CBC W/AUTO DIFF WBC: CPT

## 2024-10-12 PROCEDURE — 83690 ASSAY OF LIPASE: CPT

## 2024-10-12 PROCEDURE — 36415 COLL VENOUS BLD VENIPUNCTURE: CPT

## 2024-10-12 RX ORDER — ONDANSETRON HCL/PF 4 MG/2 ML
4 VIAL (ML) INJECTION ONCE
Refills: 0 | Status: COMPLETED | OUTPATIENT
Start: 2024-10-12 | End: 2024-10-12

## 2024-10-12 RX ORDER — ONDANSETRON HCL/PF 4 MG/2 ML
4 VIAL (ML) INJECTION ONCE
Refills: 0 | Status: DISCONTINUED | OUTPATIENT
Start: 2024-10-12 | End: 2024-10-15

## 2024-10-12 RX ORDER — FAMOTIDINE 40 MG
20 TABLET ORAL ONCE
Refills: 0 | Status: COMPLETED | OUTPATIENT
Start: 2024-10-12 | End: 2024-10-12

## 2024-10-12 RX ORDER — MAG HYDROX/ALUMINUM HYD/SIMETH 200-200-20
10 SUSPENSION, ORAL (FINAL DOSE FORM) ORAL
Qty: 200 | Refills: 0
Start: 2024-10-12 | End: 2024-10-16

## 2024-10-12 RX ORDER — MAG HYDROX/ALUMINUM HYD/SIMETH 200-200-20
30 SUSPENSION, ORAL (FINAL DOSE FORM) ORAL ONCE
Refills: 0 | Status: COMPLETED | OUTPATIENT
Start: 2024-10-12 | End: 2024-10-12

## 2024-10-12 RX ORDER — FAMOTIDINE 40 MG
1 TABLET ORAL
Qty: 10 | Refills: 0
Start: 2024-10-12 | End: 2024-10-16

## 2024-10-12 RX ORDER — SODIUM CHLORIDE 0.9 % (FLUSH) 0.9 %
1000 SYRINGE (ML) INJECTION ONCE
Refills: 0 | Status: DISCONTINUED | OUTPATIENT
Start: 2024-10-12 | End: 2024-10-15

## 2024-10-12 RX ADMIN — Medication 20 MILLIGRAM(S): at 03:55

## 2024-10-12 RX ADMIN — Medication 30 MILLILITER(S): at 03:55

## 2024-10-12 RX ADMIN — Medication 4 MILLIGRAM(S): at 05:25

## 2024-10-12 NOTE — ED PROVIDER NOTE - NSFOLLOWUPINSTRUCTIONS_ED_ALL_ED_FT
You were seen in the emergency department for: abdominal bloating, nausea  Your diagnosis for this visit was: gastritis, hiatal hernia  From this ED visit you were prescribed: Pepcid, Maalox  Your results report is attached.   We recommend you follow up with: your gastroenterologist    Please return to the Emergency Department if you experience any of the following symptoms:   - Shortness of breath or trouble breathing  - Pressure, pain or tightness in the chest  - Face drooping, arm weakness or speech difficulty  - Persistence of severe vomiting  - Head injury or loss of consciousness  - Nonstop bleeding or an open wound    (1) Follow up with your primary care physician within the next 24-48 hours as discussed. In addition, we did not find evidence of a life threatening illness on your testing here today, but listed below are the specialists that will be necessary to see as an outpatient to continue the workup.  Please call the numbers listed below or [phone] to set up the necessary appointments.  (2) Take Tylenol (up to 1000mg or 1 g)  and/or Motrin (up to 600mg) up to every 6 hours as needed for pain.   (3) If you had an IV (intravenous) line placed, it was removed. Sometimes, after IV removal, that area can be tender for a few days; if it develops redness and swelling, those could be signs of infection; in which case, return to the Emergency Department for assessment.  (4) Please continue taking all of your home medications as directed. You were seen in the emergency department for: abdominal bloating, nausea  Your diagnosis for this visit was: gastritis, hiatal hernia  From this ED visit you were prescribed: Pepcid, Maalox  Your results report is attached.   We recommend you follow up with: your gastroenterologist.     Please return to the Emergency Department if you experience any of the following symptoms:   - Shortness of breath or trouble breathing  - Pressure, pain or tightness in the chest  - Face drooping, arm weakness or speech difficulty  - Persistence of severe vomiting  - Head injury or loss of consciousness  - Nonstop bleeding or an open wound    (1) Follow up with your primary care physician within the next 24-48 hours as discussed. In addition, we did not find evidence of a life threatening illness on your testing here today, but listed below are the specialists that will be necessary to see as an outpatient to continue the workup.  Please call the numbers listed below or [phone] to set up the necessary appointments.  (2) Take Tylenol (up to 1000mg or 1 g)  and/or Motrin (up to 600mg) up to every 6 hours as needed for pain.   (3) If you had an IV (intravenous) line placed, it was removed. Sometimes, after IV removal, that area can be tender for a few days; if it develops redness and swelling, those could be signs of infection; in which case, return to the Emergency Department for assessment.  (4) Please continue taking all of your home medications as directed.

## 2024-10-12 NOTE — ED ADULT NURSE NOTE - OBJECTIVE STATEMENT
Patient presented to ED c/o of N/V and feeling abdominal bloating since 1200. Patient reports H/O of IBS. Denies chest pain/ headache

## 2024-10-12 NOTE — ED PROVIDER NOTE - PATIENT PORTAL LINK FT
You can access the FollowMyHealth Patient Portal offered by [facility] by registering at the following website: [email or web address]. By joining [employer]’s FollowMyHealth portal, you will also be able to view your health information using other applications (apps) compatible with our system.

## 2024-10-12 NOTE — ED PROVIDER NOTE - PROGRESS NOTE DETAILS
CTAP: Gastric wall thickening which may be related to under distention or   gastritis. Correlate clinically.  Small hiatal hernia.  Additional findings as above.    Given GI meds, will send rx for same. GI followup advised. CTAP: Gastric wall thickening which may be related to under distention or   gastritis. Correlate clinically.  Small hiatal hernia.  Additional findings as above.    Urine negative.  Given GI meds, will send rx for same. GI followup advised.

## 2024-10-12 NOTE — ED ADULT TRIAGE NOTE - CHIEF COMPLAINT QUOTE
Pt reports that she has Nausea , Vomited non bloody x2 , bloated abdomin with discomfort , diarrhea  few times ,  started 12 noon yesterday  .H/O IBS

## 2024-10-12 NOTE — ED PROVIDER NOTE - OBJECTIVE STATEMENT
64-year-old female hx of IBS on Linzess presenting with severe abdominal bloating, 2 episodes of NBNB emesis, and a few episodes of diarrhea. No fevers or chills. No other symptoms.

## 2024-10-12 NOTE — ED PROVIDER NOTE - CLINICAL SUMMARY MEDICAL DECISION MAKING FREE TEXT BOX
64-year-old female hx of IBS on Linzess presenting with severe abdominal bloating, 2 episodes of NBNB emesis, and a few episodes of diarrhea. WIll check labs, CTAP, provide meds, reassess.

## 2024-10-12 NOTE — ED ADULT NURSE NOTE - ALCOHOL PRE SCREEN (AUDIT - C)
Patient is calling to inform you that per the pharmacy the medication Semaglutida 2mg  Is on back order with not date as to when they would have. Per the pharmacy they do have the 1mg for this medication. Please call to discuss next step in care     Dr Lanza changed pt's medication trulicity . Pt states\" the Semaglutide 2mg is back order and the pharmacy do have a1mg , I don't want to change my medication, I want to still take Semaglutide .\"    Pls send a prescription for Semaglutide .   Statement Selected

## 2024-10-12 NOTE — ED ADULT NURSE NOTE - NS ED NURSE RECORD ANOTHER VITAL SIGN
Hospital Medicine History & Physical Note    Date of Service  12/16/2020    Primary Care Physician  Tonya Stack M.D.    Consultants  Dr horvath cards    Code Status  Full Code    Chief Complaint  Chief Complaint   Patient presents with   • Dizziness       History of Presenting Illness  81 y.o. female who presented 12/16/2020 with past medical history of osteoarthritis, dyslipidemia, tobacco dependence was in her usual state of health until Friday will she fell because she felt dizzy.  She denies any loss of consciousness and she denies any trauma to her body.  The dizziness persisted for the last 3 days.  She comes to the emergency department for this reason.  In the emergency department her blood pressure was elevated as high as 209/103.  She denies any history of hypertension.  CAT scan of the head did not show any acute abnormality and she had no focal deficits    She denies any chest pain but she chronically has shortness of breath and continues to smoke cigarettes.    No fever or chills    Emergency department  EKG that showed possible atrial fibrillation as well as a new left bundle branch block.  Cardiology MD Dr. Carlson was consulted .  I discussed the case with Dr. carlson and he did not believe that the patient has atrial fibrillation due to P wave seen.    .      echo done in ED which showed an EF of approximately 35 to 40% with wall motion abnormalities.      .    Review of Systems  Review of Systems   Constitutional: Positive for malaise/fatigue. Negative for chills, fever and weight loss.   HENT: Negative for ear pain.    Eyes: Negative.    Respiratory: Positive for shortness of breath. Negative for cough, hemoptysis and sputum production.    Cardiovascular: Negative for chest pain, palpitations and claudication.   Gastrointestinal: Negative for heartburn, nausea and vomiting.   Genitourinary: Negative for dysuria, frequency and urgency.   Musculoskeletal: Negative for back pain, myalgias and neck pain.    Skin: Negative for itching and rash.   Neurological: Positive for dizziness. Negative for tingling, tremors, sensory change, speech change and headaches.   Endo/Heme/Allergies: Negative for environmental allergies. Does not bruise/bleed easily.   Psychiatric/Behavioral: Positive for substance abuse.       Past Medical History   has a past medical history of Dyslipidemia (2/15/2013), Osteoarthritis (2/6/2013), Osteoporosis, and Thrombocytopenia (HCC) (2/15/2013).    Surgical History   has a past surgical history that includes orif, humerus and tubal coagulation laparoscopic bilateral.     Family History  family history includes Alcohol abuse in her grandson; Cancer in her father; Heart Disease in her mother and sister; Heart Disease (age of onset: 60) in her brother; Hypertension in her mother; Lung Disease in her daughter and father; No Known Problems in her son and son; Other in her brother, grandson, and son; Stroke (age of onset: 50) in her brother.     Social History   reports that she has been smoking cigarettes. She has a 88.50 pack-year smoking history. She has never used smokeless tobacco. She reports that she does not drink alcohol or use drugs.    Allergies  Allergies   Allergen Reactions   • Sulfa Drugs Unspecified     Unknown reaction         Medications  Prior to Admission Medications   Prescriptions Last Dose Informant Patient Reported? Taking?   CALCIUM PO 12/15/2020 at AM Patient Yes No   Sig: Take 1 Tab by mouth every morning.   Cholecalciferol (VITAMIN D3) 3000 UNITS Tab 12/15/2020 at PM Patient Yes No   Sig: Take 3,000 Units by mouth every evening.   Iron-Vitamins (GERITOL) Liquid 12/13/2020 at [facility] Patient Yes Yes   Sig: Take 5 mL by mouth every day.   alendronate (FOSAMAX) 70 MG Tab 12/10/2020 at [facility] Patient No No   Sig: TAKE 1 TABLET BY MOUTH EVERY 7 DAYS. PT TO MAKE APPT PRIOR TO MORE REFILLS.   atorvastatin (LIPITOR) 10 MG Tab 12/15/2020 at PM Patient No No   Sig: TAKE 1 TABLET BY MOUTH  EVERY DAY   ibuprofen (MOTRIN) 200 MG Tab 12/16/2020 at 0900 Patient Yes Yes   Sig: Take 200 mg by mouth every 6 hours as needed for Mild Pain, Headache or Inflammation.   meclizine (ANTIVERT) 25 MG TABS 12/16/2020 at 1100 Patient No No   Sig: Take 1 Tab by mouth 3 times a day as needed. Indications: Sensation of Spinning or Whirling   meloxicam (MOBIC) 7.5 MG Tab LAST WEEK at PRN Patient No No   Sig: TAKE 1 TABLET BY MOUTH EVERY DAY   Patient taking differently: Take 7.5 mg by mouth 1 time a day as needed for Moderate Pain or Inflammation.      Facility-Administered Medications: None       Physical Exam  Temp:  [36.3 °C (97.4 °F)-36.8 °C (98.2 °F)] 36.3 °C (97.4 °F)  Pulse:  [] 65  Resp:  [16-34] 19  BP: (160-209)/() 160/88  SpO2:  [95 %-97 %] 97 %    Physical Exam  Constitutional:       General: She is not in acute distress.     Appearance: She is not ill-appearing, toxic-appearing or diaphoretic.      Comments: Thin   HENT:      Head: Normocephalic and atraumatic.      Mouth/Throat:      Mouth: Mucous membranes are moist.   Eyes:      Extraocular Movements: Extraocular movements intact.      Pupils: Pupils are equal, round, and reactive to light.   Neck:      Musculoskeletal: Normal range of motion and neck supple.   Cardiovascular:      Rate and Rhythm: Rhythm irregular.      Pulses: Normal pulses.   Pulmonary:      Breath sounds: Rhonchi (Mild bilateral) present.   Abdominal:      General: Abdomen is flat. There is no distension.      Palpations: There is no mass.      Tenderness: There is no abdominal tenderness.      Hernia: No hernia is present.   Musculoskeletal:         General: No swelling, tenderness or signs of injury.   Skin:     General: Skin is warm.      Coloration: Skin is not jaundiced or pale.      Findings: No bruising or erythema.   Neurological:      General: No focal deficit present.      Mental Status: She is alert and oriented to person, place, and time.      Cranial Nerves: No  cranial nerve deficit.      Sensory: No sensory deficit.      Motor: No weakness.      Gait: Gait abnormal.   Psychiatric:         Mood and Affect: Mood normal.         Laboratory:  Recent Labs     12/16/20  1238   WBC 6.9   RBC 5.67*   HEMOGLOBIN 16.8*   HEMATOCRIT 50.9*   MCV 89.8   MCH 29.6   MCHC 33.0*   RDW 45.8   PLATELETCT 154*   MPV 12.5     Recent Labs     12/16/20  1238   SODIUM 137   POTASSIUM 4.0   CHLORIDE 100   CO2 25   GLUCOSE 97   BUN 17   CREATININE 0.71   CALCIUM 9.8     Recent Labs     12/16/20  1238   ALTSGPT 12   ASTSGOT 15   ALKPHOSPHAT 98   TBILIRUBIN 1.7*   GLUCOSE 97         No results for input(s): NTPROBNP in the last 72 hours.      Recent Labs     12/16/20  1238 12/16/20  1933   TROPONINT 13 13       Imaging:  EC-ECHOCARDIOGRAM COMPLETE W/O CONT   Final Result      CT-HEAD W/O   Final Result      1.  Chronic microvascular ischemic type changes.   2.  No acute intracranial abnormality.      DX-CHEST-PORTABLE (1 VIEW)   Final Result      No acute cardiopulmonary abnormality.      NM-CARDIAC STRESS TEST    (Results Pending)         Assessment/Plan:  I anticipate this patient is appropriate for observation status at this time.    Tobacco dependence- (present on admission)  Assessment & Plan  Smoking cessation advised    Nicotine patch as needed    Shortness of breath- (present on admission)  Assessment & Plan  Probably related to COPD    Rule out anginal equivalent with serial troponins and cardiac stress test    No overt signs of acute CHF despite reduced ejection fraction seen on echocardiogram    Fall- (present on admission)  Assessment & Plan  Ct head negative    Pt and ot eval    LBBB (left bundle branch block)- (present on admission)  Assessment & Plan  Appears new with reduced ef on echo and dizziness    Check serial troponins    Asa    Statin    Stress test    Cardiac arrhythmia- (present on admission)  Assessment & Plan  Rate control with calcium blocker or beta-blocker as  needed    Tele    Follow Cardiology MD recommendations    Essential hypertension- (present on admission)  Assessment & Plan  Po lisinopril started    Dizziness- (present on admission)  Assessment & Plan  meclizine prn    Ct head negative    Pt/ot    Control bp    Dyslipidemia- (present on admission)  Assessment & Plan  statin     Yes

## 2024-11-07 ENCOUNTER — APPOINTMENT (OUTPATIENT)
Dept: PODIATRY | Facility: CLINIC | Age: 64
End: 2024-11-07

## 2024-11-18 ENCOUNTER — APPOINTMENT (OUTPATIENT)
Dept: PEDIATRIC ALLERGY IMMUNOLOGY | Facility: CLINIC | Age: 64
End: 2024-11-18
Payer: MEDICARE

## 2024-11-18 PROCEDURE — 99204 OFFICE O/P NEW MOD 45 MIN: CPT | Mod: 95

## 2024-11-18 RX ORDER — FLUTICASONE PROPIONATE 50 UG/1
50 SPRAY, METERED NASAL DAILY
Qty: 1 | Refills: 3 | Status: ACTIVE | COMMUNITY
Start: 2024-11-18 | End: 1900-01-01

## 2024-11-18 RX ORDER — LEVOCETIRIZINE DIHYDROCHLORIDE 5 MG/1
5 TABLET ORAL DAILY
Qty: 30 | Refills: 3 | Status: ACTIVE | COMMUNITY
Start: 2024-11-18 | End: 1900-01-01

## 2024-11-29 ENCOUNTER — NON-APPOINTMENT (OUTPATIENT)
Age: 64
End: 2024-11-29

## 2024-12-23 ENCOUNTER — NON-APPOINTMENT (OUTPATIENT)
Age: 64
End: 2024-12-23

## 2024-12-23 ENCOUNTER — LABORATORY RESULT (OUTPATIENT)
Age: 64
End: 2024-12-23

## 2024-12-23 ENCOUNTER — APPOINTMENT (OUTPATIENT)
Dept: OBGYN | Facility: CLINIC | Age: 64
End: 2024-12-23
Payer: MEDICARE

## 2024-12-23 VITALS
WEIGHT: 153 LBS | HEART RATE: 92 BPM | OXYGEN SATURATION: 96 % | SYSTOLIC BLOOD PRESSURE: 110 MMHG | BODY MASS INDEX: 27.98 KG/M2 | DIASTOLIC BLOOD PRESSURE: 70 MMHG

## 2024-12-23 DIAGNOSIS — R30.0 DYSURIA: ICD-10-CM

## 2024-12-23 PROCEDURE — 99214 OFFICE O/P EST MOD 30 MIN: CPT | Mod: 25

## 2024-12-23 PROCEDURE — 99396 PREV VISIT EST AGE 40-64: CPT

## 2024-12-23 PROCEDURE — 99459 PELVIC EXAMINATION: CPT

## 2024-12-24 LAB
APPEARANCE: CLEAR
BILIRUBIN URINE: NEGATIVE
BLOOD URINE: NEGATIVE
COLOR: NORMAL
GLUCOSE QUALITATIVE U: NEGATIVE MG/DL
KETONES URINE: ABNORMAL MG/DL
LEUKOCYTE ESTERASE URINE: ABNORMAL
NITRITE URINE: NEGATIVE
PH URINE: 7.5
PROTEIN URINE: NORMAL MG/DL
SPECIFIC GRAVITY URINE: 1.03
UROBILINOGEN URINE: 1 MG/DL

## 2024-12-24 RX ORDER — NITROFURANTOIN (MONOHYDRATE/MACROCRYSTALS) 25; 75 MG/1; MG/1
100 CAPSULE ORAL TWICE DAILY
Qty: 10 | Refills: 0 | Status: ACTIVE | COMMUNITY
Start: 2024-12-24 | End: 1900-01-01

## 2024-12-27 LAB
BACTERIA UR CULT: NORMAL
CYTOLOGY CVX/VAG DOC THIN PREP: NORMAL
HPV 16 E6+E7 MRNA CVX QL NAA+PROBE: NOT DETECTED
HPV18+45 E6+E7 MRNA CVX QL NAA+PROBE: NOT DETECTED

## 2025-01-06 ENCOUNTER — APPOINTMENT (OUTPATIENT)
Dept: OPHTHALMOLOGY | Facility: CLINIC | Age: 65
End: 2025-01-06
Payer: MEDICARE

## 2025-01-06 ENCOUNTER — NON-APPOINTMENT (OUTPATIENT)
Age: 65
End: 2025-01-06

## 2025-01-06 PROCEDURE — 92012 INTRM OPH EXAM EST PATIENT: CPT

## 2025-01-22 ENCOUNTER — APPOINTMENT (OUTPATIENT)
Dept: UROGYNECOLOGY | Facility: CLINIC | Age: 65
End: 2025-01-22
Payer: MEDICARE

## 2025-01-22 VITALS
OXYGEN SATURATION: 98 % | BODY MASS INDEX: 27.62 KG/M2 | WEIGHT: 151 LBS | HEART RATE: 73 BPM | SYSTOLIC BLOOD PRESSURE: 130 MMHG | DIASTOLIC BLOOD PRESSURE: 85 MMHG

## 2025-01-22 DIAGNOSIS — R10.2 PELVIC AND PERINEAL PAIN: ICD-10-CM

## 2025-01-22 PROCEDURE — 99213 OFFICE O/P EST LOW 20 MIN: CPT | Mod: 25

## 2025-01-22 PROCEDURE — 81002 URINALYSIS NONAUTO W/O SCOPE: CPT

## 2025-01-23 ENCOUNTER — NON-APPOINTMENT (OUTPATIENT)
Age: 65
End: 2025-01-23

## 2025-01-23 LAB
BILIRUB UR QL STRIP: NORMAL
GLUCOSE UR-MCNC: NORMAL
HCG UR QL: NORMAL EU/DL
HGB UR QL STRIP.AUTO: NORMAL
KETONES UR-MCNC: NORMAL
LEUKOCYTE ESTERASE UR QL STRIP: NORMAL
NITRITE UR QL STRIP: NORMAL
PH UR STRIP: 7
PROT UR STRIP-MCNC: NORMAL
SP GR UR STRIP: 1.01

## 2025-01-24 LAB — BACTERIA UR CULT: NORMAL

## 2025-02-11 ENCOUNTER — NON-APPOINTMENT (OUTPATIENT)
Age: 65
End: 2025-02-11

## 2025-02-19 ENCOUNTER — NON-APPOINTMENT (OUTPATIENT)
Age: 65
End: 2025-02-19

## 2025-02-19 ENCOUNTER — APPOINTMENT (OUTPATIENT)
Dept: OPHTHALMOLOGY | Facility: CLINIC | Age: 65
End: 2025-02-19
Payer: MEDICARE

## 2025-02-19 PROCEDURE — 99215 OFFICE O/P EST HI 40 MIN: CPT | Mod: 25

## 2025-02-19 PROCEDURE — 92285 EXTERNAL OCULAR PHOTOGRAPHY: CPT

## 2025-02-21 ENCOUNTER — LABORATORY RESULT (OUTPATIENT)
Age: 65
End: 2025-02-21

## 2025-02-21 ENCOUNTER — NON-APPOINTMENT (OUTPATIENT)
Age: 65
End: 2025-02-21

## 2025-02-27 ENCOUNTER — APPOINTMENT (OUTPATIENT)
Dept: PEDIATRIC ALLERGY IMMUNOLOGY | Facility: CLINIC | Age: 65
End: 2025-02-27

## 2025-02-27 VITALS
DIASTOLIC BLOOD PRESSURE: 83 MMHG | SYSTOLIC BLOOD PRESSURE: 136 MMHG | BODY MASS INDEX: 27.6 KG/M2 | OXYGEN SATURATION: 97 % | WEIGHT: 150 LBS | HEIGHT: 62 IN | HEART RATE: 68 BPM

## 2025-02-27 PROCEDURE — 99214 OFFICE O/P EST MOD 30 MIN: CPT

## 2025-02-27 RX ORDER — FEXOFENADINE HYDROCHLORIDE 180 MG/1
180 TABLET ORAL
Qty: 30 | Refills: 2 | Status: ACTIVE | COMMUNITY
Start: 2025-02-27 | End: 1900-01-01

## 2025-02-27 RX ORDER — MONTELUKAST 10 MG/1
10 TABLET, FILM COATED ORAL
Qty: 30 | Refills: 4 | Status: ACTIVE | COMMUNITY
Start: 2025-02-27 | End: 1900-01-01

## 2025-02-27 RX ORDER — AZELASTINE HYDROCHLORIDE 137 UG/1
137 SPRAY, METERED NASAL TWICE DAILY
Qty: 1 | Refills: 2 | Status: ACTIVE | COMMUNITY
Start: 2025-02-27 | End: 1900-01-01

## 2025-03-03 ENCOUNTER — APPOINTMENT (OUTPATIENT)
Dept: OBGYN | Facility: CLINIC | Age: 65
End: 2025-03-03
Payer: MEDICARE

## 2025-03-03 VITALS
DIASTOLIC BLOOD PRESSURE: 80 MMHG | WEIGHT: 152 LBS | SYSTOLIC BLOOD PRESSURE: 120 MMHG | BODY MASS INDEX: 27.8 KG/M2 | HEART RATE: 84 BPM | OXYGEN SATURATION: 97 %

## 2025-03-03 DIAGNOSIS — A60.00 HERPESVIRAL INFECTION OF UROGENITAL SYSTEM, UNSPECIFIED: ICD-10-CM

## 2025-03-03 PROCEDURE — 99214 OFFICE O/P EST MOD 30 MIN: CPT | Mod: GC

## 2025-03-03 RX ORDER — ACYCLOVIR 50 MG/G
5 OINTMENT TOPICAL 4 TIMES DAILY
Qty: 1 | Refills: 1 | Status: ACTIVE | COMMUNITY
Start: 2025-03-03 | End: 1900-01-01

## 2025-03-03 RX ORDER — ESTRADIOL 0.1 MG/G
0.1 CREAM VAGINAL
Qty: 1 | Refills: 0 | Status: ACTIVE | COMMUNITY
Start: 2025-03-03 | End: 1900-01-01

## 2025-03-12 ENCOUNTER — APPOINTMENT (OUTPATIENT)
Dept: UROGYNECOLOGY | Facility: CLINIC | Age: 65
End: 2025-03-12
Payer: MEDICARE

## 2025-03-12 VITALS
OXYGEN SATURATION: 96 % | SYSTOLIC BLOOD PRESSURE: 118 MMHG | WEIGHT: 148 LBS | BODY MASS INDEX: 27.07 KG/M2 | HEART RATE: 112 BPM | DIASTOLIC BLOOD PRESSURE: 79 MMHG

## 2025-03-12 DIAGNOSIS — R35.1 NOCTURIA: ICD-10-CM

## 2025-03-12 DIAGNOSIS — R30.0 DYSURIA: ICD-10-CM

## 2025-03-12 DIAGNOSIS — N95.2 POSTMENOPAUSAL ATROPHIC VAGINITIS: ICD-10-CM

## 2025-03-12 DIAGNOSIS — N32.81 OVERACTIVE BLADDER: ICD-10-CM

## 2025-03-12 PROCEDURE — 99214 OFFICE O/P EST MOD 30 MIN: CPT

## 2025-03-12 RX ORDER — ESTRADIOL 10 UG/1
10 TABLET, FILM COATED VAGINAL
Qty: 25 | Refills: 3 | Status: ACTIVE | COMMUNITY
Start: 2025-03-12 | End: 1900-01-01

## 2025-03-12 RX ORDER — MIRABEGRON 50 MG/1
50 TABLET, EXTENDED RELEASE ORAL
Qty: 30 | Refills: 11 | Status: ACTIVE | COMMUNITY
Start: 2025-03-12 | End: 1900-01-01

## 2025-03-26 ENCOUNTER — EMERGENCY (EMERGENCY)
Facility: HOSPITAL | Age: 65
LOS: 1 days | Discharge: HOME | End: 2025-03-26
Attending: EMERGENCY MEDICINE
Payer: COMMERCIAL

## 2025-03-26 ENCOUNTER — APPOINTMENT (OUTPATIENT)
Dept: OPHTHALMOLOGY | Facility: CLINIC | Age: 65
End: 2025-03-26
Payer: MEDICARE

## 2025-03-26 ENCOUNTER — NON-APPOINTMENT (OUTPATIENT)
Age: 65
End: 2025-03-26

## 2025-03-26 VITALS
TEMPERATURE: 97 F | DIASTOLIC BLOOD PRESSURE: 78 MMHG | SYSTOLIC BLOOD PRESSURE: 111 MMHG | WEIGHT: 145.06 LBS | HEART RATE: 101 BPM | OXYGEN SATURATION: 98 % | RESPIRATION RATE: 18 BRPM

## 2025-03-26 DIAGNOSIS — Z98.890 OTHER SPECIFIED POSTPROCEDURAL STATES: Chronic | ICD-10-CM

## 2025-03-26 DIAGNOSIS — Z98.89 OTHER SPECIFIED POSTPROCEDURAL STATES: Chronic | ICD-10-CM

## 2025-03-26 LAB
ALBUMIN SERPL ELPH-MCNC: 3.5 G/DL — SIGNIFICANT CHANGE UP (ref 3.5–5)
ALP SERPL-CCNC: 100 U/L — SIGNIFICANT CHANGE UP (ref 40–120)
ALT FLD-CCNC: 24 U/L DA — SIGNIFICANT CHANGE UP (ref 10–60)
ANION GAP SERPL CALC-SCNC: 3 MMOL/L — LOW (ref 5–17)
AST SERPL-CCNC: 17 U/L — SIGNIFICANT CHANGE UP (ref 10–40)
BASOPHILS # BLD AUTO: 0.03 K/UL — SIGNIFICANT CHANGE UP (ref 0–0.2)
BASOPHILS NFR BLD AUTO: 0.3 % — SIGNIFICANT CHANGE UP (ref 0–2)
BILIRUB SERPL-MCNC: 0.3 MG/DL — SIGNIFICANT CHANGE UP (ref 0.2–1.2)
BUN SERPL-MCNC: 19 MG/DL — HIGH (ref 7–18)
CALCIUM SERPL-MCNC: 9.5 MG/DL — SIGNIFICANT CHANGE UP (ref 8.4–10.5)
CHLORIDE SERPL-SCNC: 108 MMOL/L — SIGNIFICANT CHANGE UP (ref 96–108)
CO2 SERPL-SCNC: 27 MMOL/L — SIGNIFICANT CHANGE UP (ref 22–31)
CREAT SERPL-MCNC: 0.87 MG/DL — SIGNIFICANT CHANGE UP (ref 0.5–1.3)
EGFR: 75 ML/MIN/1.73M2 — SIGNIFICANT CHANGE UP
EGFR: 75 ML/MIN/1.73M2 — SIGNIFICANT CHANGE UP
EOSINOPHIL # BLD AUTO: 0.1 K/UL — SIGNIFICANT CHANGE UP (ref 0–0.5)
EOSINOPHIL NFR BLD AUTO: 1 % — SIGNIFICANT CHANGE UP (ref 0–6)
GLUCOSE SERPL-MCNC: 107 MG/DL — HIGH (ref 70–99)
HCT VFR BLD CALC: 41.7 % — SIGNIFICANT CHANGE UP (ref 34.5–45)
HGB BLD-MCNC: 13.3 G/DL — SIGNIFICANT CHANGE UP (ref 11.5–15.5)
IMM GRANULOCYTES NFR BLD AUTO: 0.8 % — SIGNIFICANT CHANGE UP (ref 0–0.9)
LYMPHOCYTES # BLD AUTO: 2.43 K/UL — SIGNIFICANT CHANGE UP (ref 1–3.3)
LYMPHOCYTES # BLD AUTO: 25 % — SIGNIFICANT CHANGE UP (ref 13–44)
MCHC RBC-ENTMCNC: 28.1 PG — SIGNIFICANT CHANGE UP (ref 27–34)
MCHC RBC-ENTMCNC: 31.9 G/DL — LOW (ref 32–36)
MCV RBC AUTO: 88.2 FL — SIGNIFICANT CHANGE UP (ref 80–100)
MONOCYTES # BLD AUTO: 0.72 K/UL — SIGNIFICANT CHANGE UP (ref 0–0.9)
MONOCYTES NFR BLD AUTO: 7.4 % — SIGNIFICANT CHANGE UP (ref 2–14)
NEUTROPHILS # BLD AUTO: 6.37 K/UL — SIGNIFICANT CHANGE UP (ref 1.8–7.4)
NEUTROPHILS NFR BLD AUTO: 65.5 % — SIGNIFICANT CHANGE UP (ref 43–77)
NRBC BLD AUTO-RTO: 0 /100 WBCS — SIGNIFICANT CHANGE UP (ref 0–0)
PLATELET # BLD AUTO: 257 K/UL — SIGNIFICANT CHANGE UP (ref 150–400)
POTASSIUM SERPL-MCNC: 3.9 MMOL/L — SIGNIFICANT CHANGE UP (ref 3.5–5.3)
POTASSIUM SERPL-SCNC: 3.9 MMOL/L — SIGNIFICANT CHANGE UP (ref 3.5–5.3)
PROT SERPL-MCNC: 7.1 G/DL — SIGNIFICANT CHANGE UP (ref 6–8.3)
RBC # BLD: 4.73 M/UL — SIGNIFICANT CHANGE UP (ref 3.8–5.2)
RBC # FLD: 15.4 % — HIGH (ref 10.3–14.5)
SODIUM SERPL-SCNC: 138 MMOL/L — SIGNIFICANT CHANGE UP (ref 135–145)
WBC # BLD: 9.73 K/UL — SIGNIFICANT CHANGE UP (ref 3.8–10.5)
WBC # FLD AUTO: 9.73 K/UL — SIGNIFICANT CHANGE UP (ref 3.8–10.5)

## 2025-03-26 PROCEDURE — 93005 ELECTROCARDIOGRAM TRACING: CPT

## 2025-03-26 PROCEDURE — 92285 EXTERNAL OCULAR PHOTOGRAPHY: CPT

## 2025-03-26 PROCEDURE — 85025 COMPLETE CBC W/AUTO DIFF WBC: CPT

## 2025-03-26 PROCEDURE — 80053 COMPREHEN METABOLIC PANEL: CPT

## 2025-03-26 PROCEDURE — 36415 COLL VENOUS BLD VENIPUNCTURE: CPT

## 2025-03-26 PROCEDURE — 99283 EMERGENCY DEPT VISIT LOW MDM: CPT

## 2025-03-26 PROCEDURE — 99214 OFFICE O/P EST MOD 30 MIN: CPT | Mod: 25

## 2025-03-26 RX ORDER — HYDROCORTISONE 10 MG/G
1 CREAM TOPICAL
Qty: 30 | Refills: 0
Start: 2025-03-26 | End: 2025-04-01

## 2025-04-07 ENCOUNTER — APPOINTMENT (OUTPATIENT)
Dept: PEDIATRIC ALLERGY IMMUNOLOGY | Facility: CLINIC | Age: 65
End: 2025-04-07

## 2025-04-07 ENCOUNTER — APPOINTMENT (OUTPATIENT)
Dept: GASTROENTEROLOGY | Facility: CLINIC | Age: 65
End: 2025-04-07

## 2025-04-07 PROCEDURE — 99214 OFFICE O/P EST MOD 30 MIN: CPT | Mod: 2W

## 2025-04-07 RX ORDER — LEVOCETIRIZINE DIHYDROCHLORIDE 0.5 MG/ML
2.5 SOLUTION ORAL
Qty: 150 | Refills: 2 | Status: ACTIVE | COMMUNITY
Start: 2025-04-07 | End: 1900-01-01

## 2025-04-14 ENCOUNTER — APPOINTMENT (OUTPATIENT)
Dept: PEDIATRIC ALLERGY IMMUNOLOGY | Facility: CLINIC | Age: 65
End: 2025-04-14
Payer: MEDICARE

## 2025-04-14 PROCEDURE — 99214 OFFICE O/P EST MOD 30 MIN: CPT | Mod: 2W

## 2025-04-14 RX ORDER — MONTELUKAST 10 MG/1
10 TABLET, FILM COATED ORAL
Qty: 30 | Refills: 4 | Status: ACTIVE | COMMUNITY
Start: 2025-04-14 | End: 1900-01-01

## 2025-04-29 ENCOUNTER — OUTPATIENT (OUTPATIENT)
Dept: OUTPATIENT SERVICES | Facility: HOSPITAL | Age: 65
LOS: 1 days | End: 2025-04-29
Payer: COMMERCIAL

## 2025-04-29 ENCOUNTER — APPOINTMENT (OUTPATIENT)
Dept: PODIATRY | Facility: CLINIC | Age: 65
End: 2025-04-29
Payer: MEDICARE

## 2025-04-29 VITALS
SYSTOLIC BLOOD PRESSURE: 110 MMHG | DIASTOLIC BLOOD PRESSURE: 78 MMHG | RESPIRATION RATE: 18 BRPM | WEIGHT: 148 LBS | HEIGHT: 62 IN | HEART RATE: 84 BPM | BODY MASS INDEX: 27.23 KG/M2 | TEMPERATURE: 97.7 F | OXYGEN SATURATION: 99 %

## 2025-04-29 DIAGNOSIS — Z98.89 OTHER SPECIFIED POSTPROCEDURAL STATES: Chronic | ICD-10-CM

## 2025-04-29 DIAGNOSIS — Z98.890 OTHER SPECIFIED POSTPROCEDURAL STATES: Chronic | ICD-10-CM

## 2025-04-29 DIAGNOSIS — Z00.00 ENCOUNTER FOR GENERAL ADULT MEDICAL EXAMINATION WITHOUT ABNORMAL FINDINGS: ICD-10-CM

## 2025-04-29 PROCEDURE — 99203 OFFICE O/P NEW LOW 30 MIN: CPT

## 2025-04-29 PROCEDURE — G0463: CPT

## 2025-04-30 NOTE — ASU PATIENT PROFILE, ADULT - NS PRO MODE OF ARRIVAL
Patient: Paula Ferguson   48 y.o. female   : 1977   MRN: [id number] Attending: Mireya Vitale DO  Full Code  PRIMARY CARE MD: Adelina Gregg, APRN - NP      ASSESSMENT & PLAN  Hypoglycemia.  Likely secondary to skipped meals.  Blood glucose was 30 when patient initially presented to the [place] emergency department at 8:43 AM this morning and dropped as low as 17.  Blood improved to 200s.  However, will monitor further before resuming any kind of long-acting insulin.    Acute urinary tract infection.  Patient was given 1 dose of IV Zosyn and IV vancomycin in the emergency department at [place].  This is been de-escalated to IV ceftriaxone.  Because patient was tender to palpation in the suprapubic region as well as right lower quadrant, a CT of the abdomen/pelvis with IV contrast was ordered.  Indication was \" to rule out appendicitis\" and p.o. contrast was deferred to radiologist.  P.o. contrast was not used in the study however, and radiologist read the CT scan of the abdomen as appendix not being visualized, however without any secondary signs of appendicitis.  On exam, patient is much more tender in the suprapubic region than the right lower quadrant.  IV Flagyl was started empirically given the CT scan findings regardless--would just monitor clinically.  Procalcitonin levels are completely normal, arguing against bacterial infection.  Lactic acid level improved from 2.51 to 1.7.  Lactic acidosis.  Received 1 Liter normal saline bolus in the emergency department in [place].  Another 1 L bolus, this time with lactated Ringer's was ordered after she arrived to [facility] as patient was persistently tachycardic.  Continue IV fluids with normal saline at 100 cc/h.  Hold metformin.  Patient's UA is abnormal, but her procalcitonin levels are normal.    Vaginal itching.  Fluconazole 150 mg p.o. X1 dose.  Hypokalemia with potassium 3.1.  One dose of potassium chloride 40 mEq  ambulatory

## 2025-05-01 DIAGNOSIS — M20.21 HALLUX RIGIDUS, RIGHT FOOT: ICD-10-CM

## 2025-05-02 ENCOUNTER — RX RENEWAL (OUTPATIENT)
Age: 65
End: 2025-05-02

## 2025-05-27 ENCOUNTER — LABORATORY RESULT (OUTPATIENT)
Age: 65
End: 2025-05-27

## 2025-05-27 ENCOUNTER — APPOINTMENT (OUTPATIENT)
Dept: RHEUMATOLOGY | Facility: CLINIC | Age: 65
End: 2025-05-27
Payer: MEDICARE

## 2025-05-27 VITALS
TEMPERATURE: 98.3 F | BODY MASS INDEX: 25.58 KG/M2 | OXYGEN SATURATION: 98 % | WEIGHT: 139 LBS | SYSTOLIC BLOOD PRESSURE: 131 MMHG | DIASTOLIC BLOOD PRESSURE: 82 MMHG | HEART RATE: 72 BPM | HEIGHT: 62 IN

## 2025-05-27 DIAGNOSIS — L71.9 ROSACEA, UNSPECIFIED: ICD-10-CM

## 2025-05-27 DIAGNOSIS — R76.0 RAISED ANTIBODY TITER: ICD-10-CM

## 2025-05-27 DIAGNOSIS — L30.9 DERMATITIS, UNSPECIFIED: ICD-10-CM

## 2025-05-27 DIAGNOSIS — R76.8 OTHER SPECIFIED ABNORMAL IMMUNOLOGICAL FINDINGS IN SERUM: ICD-10-CM

## 2025-05-27 PROCEDURE — 99215 OFFICE O/P EST HI 40 MIN: CPT | Mod: 25

## 2025-05-28 LAB
ALBUMIN MFR SERPL ELPH: 60.4 %
ALBUMIN SERPL ELPH-MCNC: 4.1 G/DL
ALBUMIN SERPL-MCNC: 4 G/DL
ALBUMIN/GLOB SERPL: 1.5 RATIO
ALP BLD-CCNC: 93 U/L
ALPHA1 GLOB MFR SERPL ELPH: 4.3 %
ALPHA1 GLOB SERPL ELPH-MCNC: 0.3 G/DL
ALPHA2 GLOB MFR SERPL ELPH: 11.9 %
ALPHA2 GLOB SERPL ELPH-MCNC: 0.8 G/DL
ALT SERPL-CCNC: 26 U/L
ANION GAP SERPL CALC-SCNC: 14 MMOL/L
APPEARANCE: CLEAR
AST SERPL-CCNC: 21 U/L
B-GLOBULIN MFR SERPL ELPH: 12 %
B-GLOBULIN SERPL ELPH-MCNC: 0.8 G/DL
BILIRUB SERPL-MCNC: 0.2 MG/DL
BILIRUBIN URINE: NEGATIVE
BLOOD URINE: NEGATIVE
BUN SERPL-MCNC: 14 MG/DL
C3 SERPL-MCNC: 141 MG/DL
C4 SERPL-MCNC: 30 MG/DL
CALCIUM SERPL-MCNC: 10.1 MG/DL
CENTROMERE IGG SER-ACNC: <0.2 AL
CHLORIDE SERPL-SCNC: 103 MMOL/L
CK SERPL-CCNC: 38 U/L
CO2 SERPL-SCNC: 24 MMOL/L
COLOR: YELLOW
CREAT SERPL-MCNC: 0.87 MG/DL
CRP SERPL-MCNC: <3 MG/L
DSDNA AB SER-ACNC: <1 IU/ML
EGFRCR SERPLBLD CKD-EPI 2021: 75 ML/MIN/1.73M2
ENA RNP AB SER IA-ACNC: 0.9 AL
ENA SCL70 IGG SER IA-ACNC: <0.2 AL
ENA SM AB SER IA-ACNC: <0.2 AL
ENA SS-A AB SER IA-ACNC: <0.2 AL
ENA SS-B AB SER IA-ACNC: <0.2 AL
ERYTHROCYTE [SEDIMENTATION RATE] IN BLOOD BY WESTERGREN METHOD: 20 MM/HR
GAMMA GLOB FLD ELPH-MCNC: 0.8 G/DL
GAMMA GLOB MFR SERPL ELPH: 11.4 %
GLUCOSE QUALITATIVE U: NEGATIVE MG/DL
GLUCOSE SERPL-MCNC: 80 MG/DL
HCT VFR BLD CALC: 43.4 %
HGB BLD-MCNC: 13.5 G/DL
IGG SERPL-MCNC: 736 MG/DL
IGG1 SER-MCNC: 454 MG/DL
IGG2 SER-MCNC: 173 MG/DL
IGG3 SER-MCNC: 38.4 MG/DL
IGG4 SER-MCNC: 1.9 MG/DL
INTERPRETATION SERPL IEP-IMP: NORMAL
KETONES URINE: NEGATIVE MG/DL
LEUKOCYTE ESTERASE URINE: NEGATIVE
LUPUS ANTICOAGULANT CASCADE REFLEX: NORMAL
MCHC RBC-ENTMCNC: 28.7 PG
MCHC RBC-ENTMCNC: 31.1 G/DL
MCV RBC AUTO: 92.3 FL
NITRITE URINE: NEGATIVE
PH URINE: 7
PLATELET # BLD AUTO: 253 K/UL
POTASSIUM SERPL-SCNC: 4.1 MMOL/L
PROT SERPL-MCNC: 6.6 G/DL
PROTEIN URINE: NEGATIVE MG/DL
RBC # BLD: 4.7 M/UL
RBC # FLD: 15.2 %
RHEUMATOID FACT SER QL: 10 IU/ML
SODIUM SERPL-SCNC: 141 MMOL/L
SPECIFIC GRAVITY URINE: 1.02
THYROGLOB AB SERPL-ACNC: 17.8 IU/ML
THYROPEROXIDASE AB SERPL IA-ACNC: 11.1 IU/ML
UROBILINOGEN URINE: 0.2 MG/DL
WBC # FLD AUTO: 11.01 K/UL

## 2025-05-29 LAB
ACE BLD-CCNC: 33 U/L
ANA SER IF-ACNC: NEGATIVE

## 2025-05-30 LAB — RNA POLYMERASE III IGG: 6 UNITS

## 2025-07-16 ENCOUNTER — APPOINTMENT (OUTPATIENT)
Dept: RHEUMATOLOGY | Facility: CLINIC | Age: 65
End: 2025-07-16
Payer: MEDICARE

## 2025-07-16 PROCEDURE — 99215 OFFICE O/P EST HI 40 MIN: CPT | Mod: 2W

## 2025-07-16 PROCEDURE — G2211 COMPLEX E/M VISIT ADD ON: CPT | Mod: 2W

## 2025-07-17 ENCOUNTER — NON-APPOINTMENT (OUTPATIENT)
Age: 65
End: 2025-07-17

## 2025-07-17 ENCOUNTER — APPOINTMENT (OUTPATIENT)
Dept: OPHTHALMOLOGY | Facility: CLINIC | Age: 65
End: 2025-07-17
Payer: MEDICARE

## 2025-07-17 PROCEDURE — 92285 EXTERNAL OCULAR PHOTOGRAPHY: CPT

## 2025-07-17 PROCEDURE — 99214 OFFICE O/P EST MOD 30 MIN: CPT | Mod: 25

## 2025-09-18 ENCOUNTER — APPOINTMENT (OUTPATIENT)
Dept: INTERNAL MEDICINE | Facility: CLINIC | Age: 65
End: 2025-09-18
Payer: MEDICARE

## 2025-09-18 VITALS
TEMPERATURE: 97.3 F | OXYGEN SATURATION: 96 % | BODY MASS INDEX: 25.58 KG/M2 | HEART RATE: 68 BPM | HEIGHT: 62 IN | DIASTOLIC BLOOD PRESSURE: 83 MMHG | SYSTOLIC BLOOD PRESSURE: 145 MMHG | WEIGHT: 139 LBS

## 2025-09-18 DIAGNOSIS — G43.909 MIGRAINE, UNSPECIFIED, NOT INTRACTABLE, W/OUT STATUS MIGRAINOSUS: ICD-10-CM

## 2025-09-18 DIAGNOSIS — Z87.891 PERSONAL HISTORY OF NICOTINE DEPENDENCE: ICD-10-CM

## 2025-09-18 DIAGNOSIS — K59.09 OTHER CONSTIPATION: ICD-10-CM

## 2025-09-18 DIAGNOSIS — F41.9 ANXIETY DISORDER, UNSPECIFIED: ICD-10-CM

## 2025-09-18 PROCEDURE — 99203 OFFICE O/P NEW LOW 30 MIN: CPT | Mod: 25

## 2025-09-18 PROCEDURE — 93000 ELECTROCARDIOGRAM COMPLETE: CPT

## 2025-09-18 RX ORDER — PRUCALOPRIDE 2 MG/1
2 TABLET, FILM COATED ORAL
Refills: 0 | Status: ACTIVE | COMMUNITY

## 2025-09-18 RX ORDER — RIZATRIPTAN BENZOATE 10 MG/1
TABLET ORAL
Refills: 0 | Status: ACTIVE | COMMUNITY

## 2025-09-19 LAB
ANION GAP SERPL CALC-SCNC: 13 MMOL/L
BASOPHILS # BLD AUTO: 0.04 K/UL
BASOPHILS NFR BLD AUTO: 0.4 %
BUN SERPL-MCNC: 14 MG/DL
CALCIUM SERPL-MCNC: 9.9 MG/DL
CHLORIDE SERPL-SCNC: 104 MMOL/L
CO2 SERPL-SCNC: 24 MMOL/L
CREAT SERPL-MCNC: 0.88 MG/DL
EGFRCR SERPLBLD CKD-EPI 2021: 74 ML/MIN/1.73M2
EOSINOPHIL # BLD AUTO: 0.09 K/UL
EOSINOPHIL NFR BLD AUTO: 0.9 %
GLUCOSE SERPL-MCNC: 124 MG/DL
HCT VFR BLD CALC: 42.2 %
HGB BLD-MCNC: 13 G/DL
IMM GRANULOCYTES NFR BLD AUTO: 0.6 %
LYMPHOCYTES # BLD AUTO: 2.71 K/UL
LYMPHOCYTES NFR BLD AUTO: 26.7 %
MAN DIFF?: NORMAL
MCHC RBC-ENTMCNC: 29.2 PG
MCHC RBC-ENTMCNC: 30.8 G/DL
MCV RBC AUTO: 94.8 FL
MONOCYTES # BLD AUTO: 0.56 K/UL
MONOCYTES NFR BLD AUTO: 5.5 %
NEUTROPHILS # BLD AUTO: 6.7 K/UL
NEUTROPHILS NFR BLD AUTO: 65.9 %
PLATELET # BLD AUTO: 241 K/UL
POTASSIUM SERPL-SCNC: 4.1 MMOL/L
RBC # BLD: 4.45 M/UL
RBC # FLD: 14.7 %
SODIUM SERPL-SCNC: 140 MMOL/L
WBC # FLD AUTO: 10.16 K/UL

## (undated) DEVICE — SUT VICRYL 2-0 27" SH UNDYED

## (undated) DEVICE — ELCTR BOVIE BLADE 3/4" EXTENDED LENGTH 6"

## (undated) DEVICE — LUBRICATING JELLY ONESHOT 1.25OZ

## (undated) DEVICE — VENODYNE/SCD SLEEVE CALF MEDIUM

## (undated) DEVICE — BLADE SCALPEL SAFETYLOCK #10

## (undated) DEVICE — SPECIMEN CONTAINER 100ML

## (undated) DEVICE — SYR LUER LOK 50CC

## (undated) DEVICE — SUT PDS II 1 48" TP-1

## (undated) DEVICE — SNARE LOOP POLY DISP 30MM LOOP

## (undated) DEVICE — POSITIONER FOAM EGG CRATE ULNAR 2PCS (PINK)

## (undated) DEVICE — XI SEAL UNIV 5- 8 MM

## (undated) DEVICE — XI ARM CLIP APPLIER LARGE

## (undated) DEVICE — DRSG CURITY GAUZE SPONGE 4 X 4" 12-PLY NON-STERILE

## (undated) DEVICE — INSUFFLATION NDL COVIDIEN STEP 14G FOR STEP/VERSASTEP

## (undated) DEVICE — DRSG GAUZE VASELINE PETROLEUM 3 X 18"

## (undated) DEVICE — TUBING IV SET GRAVITY 3Y 100" MACRO

## (undated) DEVICE — DRAPE LAPAROTOMY TRANSVERSE

## (undated) DEVICE — SUT ETHIBOND 2-0 30" SH

## (undated) DEVICE — D HELP - CLEARVIEW CLEARIFY SYSTEM

## (undated) DEVICE — GLV 7.5 PROTEXIS (WHITE)

## (undated) DEVICE — XI ARM PERMANENT CAUTERY SPATULA

## (undated) DEVICE — FORCEP RADIAL JAW 4 W NDL 2.2MM 2.8MM 240CM ORANGE DISP

## (undated) DEVICE — RETRIEVER ROTH NET PLATINUM-UNIVERSAL

## (undated) DEVICE — DRSG CURITY GAUZE SPONGE 4 X 4" 12-PLY

## (undated) DEVICE — PACK BASIN SPECIAL PROCEDURE

## (undated) DEVICE — POSITIONER PINK PAD PIGAZZI SYSTEM

## (undated) DEVICE — NDL COUNTER FOAM AND MAGNET 40-70

## (undated) DEVICE — XI ENDOWRIST STAPLER SHEATH

## (undated) DEVICE — CATH ELCTR GLIDE PRB 7FR

## (undated) DEVICE — MEDICATION LABELS W MARKER

## (undated) DEVICE — TUBING SUCTION 20FT

## (undated) DEVICE — SUT CHROMIC 0 30" V-20

## (undated) DEVICE — PREP BETADINE SPONGE STICKS

## (undated) DEVICE — SUT ETHIBOND 0 30" CT-1 GREEN

## (undated) DEVICE — SUT MONOCRYL 4-0 18" P-3 UNDYED

## (undated) DEVICE — SENSOR O2 FINGER ADULT

## (undated) DEVICE — SUT MONOCRYL 4-0 27" PS-2 UNDYED

## (undated) DEVICE — DRSG STERISTRIPS 0.5 X 4"

## (undated) DEVICE — DRAPE LIGHT HANDLE COVER (BLUE)

## (undated) DEVICE — SUCTION YANKAUER NO CONTROL VENT

## (undated) DEVICE — XI ARM NEEDLE DRIVER SUTURECUT MEGA 8MM

## (undated) DEVICE — ANESTHESIA CIRCUIT ADULT HMEF

## (undated) DEVICE — STAPLER SKIN VISI-STAT 35 WIDE

## (undated) DEVICE — TROCAR COVIDIEN BLUNT TIP HASSAN 12MM

## (undated) DEVICE — DRSG MASTISOL

## (undated) DEVICE — TUBING CANNULA SALTER LABS NASAL ADULT 7FT

## (undated) DEVICE — OSTOMY KIT 2-PIECE 4" NS (YELLOW)

## (undated) DEVICE — Device

## (undated) DEVICE — CANISTER DISPOSABLE THIN WALL 3000CC

## (undated) DEVICE — SOL IRR POUR NS 0.9% 500ML

## (undated) DEVICE — URETERAL CATH RED RUBBER 14FR (GREEN)

## (undated) DEVICE — GLV 7 PROTEXIS (BLUE)

## (undated) DEVICE — VESSEL LOOP BATRIK MAXI YELLOW

## (undated) DEVICE — LABELS BLANK W PEN

## (undated) DEVICE — DRAPE LAPAROTOMY W VELCRO CORD TABS

## (undated) DEVICE — ELCTR GROUNDING PAD ADULT COVIDIEN

## (undated) DEVICE — CONTAINER FORMALIN 10% 20ML

## (undated) DEVICE — GOWN TRIMAX LG

## (undated) DEVICE — DRAPE TOWEL BLUE 17" X 24"

## (undated) DEVICE — XI ARM CLIP APPLIER MEDIUM-LARGE

## (undated) DEVICE — DRAPE 1/2 SHEET 40X57"

## (undated) DEVICE — POSITIONER PURPLE ARM ONE STEP (LARGE)

## (undated) DEVICE — SYR ASEPTO

## (undated) DEVICE — WARMING BLANKET UPPER ADULT

## (undated) DEVICE — DRSG TAPE MEDIPORE 6"

## (undated) DEVICE — PREP CHLORAPREP HI-LITE ORANGE 26ML

## (undated) DEVICE — GLV 8 PROTEXIS (WHITE)

## (undated) DEVICE — SOL IRR POUR H2O 250ML

## (undated) DEVICE — SUCTION YANKAUER OPEN TIP NO VENT CURVE

## (undated) DEVICE — GEL AQUSNC PACKET 20GR

## (undated) DEVICE — KIT ENDO PROCEDURE CUST W/VLV

## (undated) DEVICE — DRAPE MAYO STAND 30"

## (undated) DEVICE — DRSG OPSITE 13.75 X 4"

## (undated) DEVICE — STAPLER COVIDIEN ENDO GIA STANDARD HANDLE

## (undated) DEVICE — XI DRAPE COLUMN

## (undated) DEVICE — PACK MINOR NO DRAPE

## (undated) DEVICE — FOLEY TRAY 16FR 5CC LTX UMETER CLOSED

## (undated) DEVICE — XI TIP COVER

## (undated) DEVICE — ADAPTER ENDO CHNL SINGLE USE

## (undated) DEVICE — MASK LRG MED AND HIGH O2 CONC M TO M 10FT

## (undated) DEVICE — TROCAR SURGIQUEST AIRSEAL 12MMX100MM

## (undated) DEVICE — FORCEP BIOPSY 2.5MM DISP

## (undated) DEVICE — TROCAR SURGIQUEST AIRSEAL 5MM X 100MM

## (undated) DEVICE — ELCTR BOVIE TIP BLADE INSULATED 2.8" EDGE WITH SAFETY

## (undated) DEVICE — SUT SOFSILK 3-0 30" V-20

## (undated) DEVICE — BITE BLOCK ADULT 20 X 27MM (GREEN)

## (undated) DEVICE — MARKING PEN W RULER

## (undated) DEVICE — VENODYNE/SCD SLEEVE CALF LARGE

## (undated) DEVICE — GLV 7 PROTEXIS (WHITE)

## (undated) DEVICE — SOL IRR POUR NS 0.9% 1500ML

## (undated) DEVICE — NDL HYPO REGULAR BEVEL 25G X 1.5" (BLUE)

## (undated) DEVICE — CLAMP BX HOT RAD JAW 3

## (undated) DEVICE — NDL INJ SCLERO INTERJECT 23G

## (undated) DEVICE — DRAIN PENROSE .5" X 18" LATEX

## (undated) DEVICE — DRAPE BACK TABLE COVER HEAVY DUTY 5'X2'

## (undated) DEVICE — DRSG TEGADERM 6"X8"

## (undated) DEVICE — DRAPE 3/4 SHEET W REINFORCEMENT 56X77"

## (undated) DEVICE — XI VESSEL SEALER

## (undated) DEVICE — TAPE SILK 2"

## (undated) DEVICE — DRSG COMBINE 5X9"

## (undated) DEVICE — TUBING MEDI-VAC W MAXIGRIP CONNECTORS 1/4"X6'

## (undated) DEVICE — GLV 6.5 PROTEXIS (WHITE)

## (undated) DEVICE — PACK MINOR WITH LAP

## (undated) DEVICE — XI ARM DISSECTOR CURVED BIPOLAR 8MM

## (undated) DEVICE — BLADE SCALPEL SAFETYLOCK #15

## (undated) DEVICE — STERIS DEFENDO 3-PIECE KIT (AIR/WATER, SUCTION & BIOPSY VALVES)

## (undated) DEVICE — SOLIDIFIER ISOLYZER 2000 CC

## (undated) DEVICE — TUBING SUCTION NONCONDUCTIVE 6MM X 12FT

## (undated) DEVICE — SYR LUER LOK 10CC

## (undated) DEVICE — GLV 8.5 PROTEXIS (WHITE)

## (undated) DEVICE — XI OBTURATOR OPTICAL BLADELESS 8MM

## (undated) DEVICE — DRAPE GENERAL ENDOSCOPY

## (undated) DEVICE — SOL INJ NS 0.9% 500ML 1-PORT